# Patient Record
Sex: MALE | Race: WHITE | NOT HISPANIC OR LATINO | Employment: FULL TIME | URBAN - METROPOLITAN AREA
[De-identification: names, ages, dates, MRNs, and addresses within clinical notes are randomized per-mention and may not be internally consistent; named-entity substitution may affect disease eponyms.]

---

## 2017-01-05 ENCOUNTER — PATIENT OUTREACH (OUTPATIENT)
Dept: ADMINISTRATIVE | Facility: CLINIC | Age: 62
End: 2017-01-05
Payer: COMMERCIAL

## 2017-01-05 NOTE — Clinical Note
Please forward this important TCC information to your provider in order to maximize the post discharge care delivery of this patient.  C3 nurse spoke with Tr Chen  for a TCC post hospital discharge follow up call. The patient does not have a scheduled HOSFU appointment with Cornelius Bragg MD  within 7-14 days post hospital discharge date 1/3. C3 nurse was unable to schedule HOSFU appointment in [facility]. Please contact patient and schedule follow up appointment using HOSFU visit type on or before 1/17. Pt has appt with Dr Ruiz on 1/10@[phone]  Respectfully, Monica Hernández, SOBEIDA  [facility]   [email or web address]     Please do not reply to this message, as this inbox is not routinely monitored.

## 2017-01-05 NOTE — PATIENT INSTRUCTIONS
Fall Prevention   Falls often occur due to slipping, tripping or losing your balance. Here are ways to reduce your risk of falling again.   Was there anything that caused your fall that can be fixed, removed or replaced?   Make your home safe by keeping walkways clear of objects you may trip over.   Use non-slip pads under rugs.   Do not walk in poorly lit areas.   Do not stand on chairs or wobbly ladders.   Use caution when reaching overhead or looking upward. This position can cause a loss of balance.   Be sure your shoes fit properly, have non-slip bottoms and are in good condition.   Be cautious when going up and down stairs, curbs, and when walking on uneven sidewalks.   If your balance is poor, consider using a cane or walker.   If your fall was related to alcohol use, stop or limit alcohol intake.   If your fall was related to use of sleeping medicines, talk to your doctor about this. You may need to reduce your dosage at bedtime if you awaken during the night to go to the bathroom.   To reduce the need for nighttime bathroom trips:   Avoid drinking fluids for several hours before going to bed   Empty your bladder before going to bed   Men can keep a urinal at the bedside  Stay as active as you can. Balance, flexibility, strength, and endurance all come from exercise. They all play a role in preventing falls. Ask your heathcare provider which types of activity are right for you.  © 3122-3342 [employer]. [address] [place] [address], [place] [address]. All rights reserved. This information is not intended as a substitute for professional medical care. Always follow your healthcare professional's instructions.

## 2017-01-25 ENCOUNTER — OFFICE VISIT (OUTPATIENT)
Dept: FAMILY MEDICINE | Facility: CLINIC | Age: 62
End: 2017-01-25
Payer: COMMERCIAL

## 2017-01-25 VITALS
HEIGHT: 75 IN | BODY MASS INDEX: 23.28 KG/M2 | TEMPERATURE: 99 F | OXYGEN SATURATION: 98 % | HEART RATE: 79 BPM | WEIGHT: 187.19 LBS | DIASTOLIC BLOOD PRESSURE: 78 MMHG | SYSTOLIC BLOOD PRESSURE: 138 MMHG

## 2017-01-25 DIAGNOSIS — I10 ESSENTIAL HYPERTENSION: ICD-10-CM

## 2017-01-25 DIAGNOSIS — R37 SEXUAL DYSFUNCTION: ICD-10-CM

## 2017-01-25 DIAGNOSIS — C18.9 MALIGNANT NEOPLASM OF COLON, UNSPECIFIED SITE: ICD-10-CM

## 2017-01-25 DIAGNOSIS — G89.29 OTHER CHRONIC PAIN: Primary | ICD-10-CM

## 2017-01-25 PROCEDURE — 3078F DIAST BP <80 MM HG: CPT | Mod: S$GLB,,, | Performed by: INTERNAL MEDICINE

## 2017-01-25 PROCEDURE — 1159F MED LIST DOCD IN RCRD: CPT | Mod: S$GLB,,, | Performed by: INTERNAL MEDICINE

## 2017-01-25 PROCEDURE — 99999 PR PBB SHADOW E&M-EST. PATIENT-LVL III: CPT | Mod: PBBFAC,,, | Performed by: INTERNAL MEDICINE

## 2017-01-25 PROCEDURE — 99214 OFFICE O/P EST MOD 30 MIN: CPT | Mod: S$GLB,,, | Performed by: INTERNAL MEDICINE

## 2017-01-25 PROCEDURE — 3075F SYST BP GE 130 - 139MM HG: CPT | Mod: S$GLB,,, | Performed by: INTERNAL MEDICINE

## 2017-01-25 RX ORDER — SILDENAFIL 100 MG/1
100 TABLET, FILM COATED ORAL DAILY PRN
Qty: 12 TABLET | Refills: 12 | Status: SHIPPED | OUTPATIENT
Start: 2017-01-25 | End: 2018-01-30 | Stop reason: SDUPTHER

## 2017-01-25 RX ORDER — HYDROCODONE BITARTRATE AND ACETAMINOPHEN 5; 325 MG/1; MG/1
1 TABLET ORAL EVERY 6 HOURS PRN
Qty: 120 TABLET | Refills: 0 | Status: SHIPPED | OUTPATIENT
Start: 2017-02-25 | End: 2017-01-26 | Stop reason: SDUPTHER

## 2017-01-25 RX ORDER — HYDROCODONE BITARTRATE AND ACETAMINOPHEN 5; 325 MG/1; MG/1
1 TABLET ORAL EVERY 6 HOURS PRN
Qty: 120 TABLET | Refills: 0 | Status: SHIPPED | OUTPATIENT
Start: 2017-01-25 | End: 2017-05-01 | Stop reason: SDUPTHER

## 2017-01-25 RX ORDER — HYDROCODONE BITARTRATE AND ACETAMINOPHEN 5; 325 MG/1; MG/1
1 TABLET ORAL EVERY 6 HOURS PRN
Qty: 120 TABLET | Refills: 0 | Status: SHIPPED | OUTPATIENT
Start: 2017-03-25 | End: 2017-01-26 | Stop reason: SDUPTHER

## 2017-01-25 RX ORDER — HYDROCODONE BITARTRATE AND ACETAMINOPHEN 5; 325 MG/1; MG/1
1 TABLET ORAL EVERY 4 HOURS PRN
Qty: 30 TABLET | Refills: 0 | Status: CANCELLED | OUTPATIENT
Start: 2017-01-25

## 2017-01-25 NOTE — PROGRESS NOTES
Chief complaint refills and follow-up from colon cancer    61-year-old white male who finally did have his needed colonoscopy whereupon colon cancer was found.  He recently had a low anterior resection of the surgery notes were reviewed and appears he was doing well.  He's here for his refills of his chronic a medication which was mostly due to his activity with activity related worsening of his arthritis which was particularly in both ankles. Patient has appointment with oncology tomorrow.  I explained his staging as well as gave him a copy of the pathology report.  His stepdaughter is an oncologist in [facility] encouraged him to discuss with her as well as.  We discussed the probability that he will benefit from some chemotherapy.  Questions about going to work which I do have confidence should be back to full duty in the next few months.  He also recently had a little bit of new sexual dysfunction with anorgasmia on one occasion but no erectile dysfunction and I reassured him I can't predict anything related to the surgery, medications and so forth at this point would relate to that and he should assess if indeed is an ongoing problem.  Patient counseled at length regarding all these issuesTotal time over 25 minutes with over 50% counseling.      PAST MEDICAL HISTORY:   1. ED.   2. HTN.   3. Smoker.   4. Allergic rhinitis with exposure to mold   5. Frequent bronchitis   6. Lumbar strain due to work on [employer]   7. Large colon polyp removed before age 50 - over 10-15 -yrs  8.  Chronic pain referable to ankle arthritis   9.  Colon cancer 2016 with low anterior resection    Social history: Still works both one pack per day, no alcohol previously  with kids, works on the railroad     Family history: Mom  of breast cancer, father  with dementia. One younger brother with no medical problems    Vitals as above  Gen: no distress    Exam otherwise deferred, surgical incision below the umbilicus is  healing well without infection on examination today        Tr was seen today for medication refill.    Diagnoses and all orders for this visit:    Other chronic pain, back to his chronic and stable pain, that doesn't appear to be any increase in pain related to the recent surgery    Essential hypertension, chronic and stable    Malignant neoplasm of colon, unspecified site, discussed as above    Sexual dysfunction, monitor clinically but reassured him there is no new erectile dysfunction which would be a bigger problem    Other orders  -     Cancel: hydrocodone-acetaminophen 5-325mg (NORCO) 5-325 mg per tablet; Take 1 tablet by mouth every 4 (four) hours as needed.  -     sildenafil (VIAGRA) 100 MG tablet; Take 1 tablet (100 mg total) by mouth daily as needed for Erectile Dysfunction. half -1 Tablet Oral .  Take 30 min before anticipated need.  -     hydrocodone-acetaminophen 5-325mg (NORCO) 5-325 mg per tablet; Take 1 tablet by mouth every 6 (six) hours as needed for Pain.  -     hydrocodone-acetaminophen 5-325mg (NORCO) 5-325 mg per tablet; Take 1 tablet by mouth every 6 (six) hours as needed for Pain.  -     hydrocodone-acetaminophen 5-325mg (NORCO) 5-325 mg per tablet; Take 1 tablet by mouth every 6 (six) hours as needed for Pain.

## 2017-01-26 ENCOUNTER — TELEPHONE (OUTPATIENT)
Dept: PHARMACY | Facility: CLINIC | Age: 62
End: 2017-01-26

## 2017-01-26 ENCOUNTER — INITIAL CONSULT (OUTPATIENT)
Dept: HEMATOLOGY/ONCOLOGY | Facility: CLINIC | Age: 62
End: 2017-01-26
Payer: COMMERCIAL

## 2017-01-26 ENCOUNTER — LAB VISIT (OUTPATIENT)
Dept: LAB | Facility: HOSPITAL | Age: 62
End: 2017-01-26
Attending: INTERNAL MEDICINE
Payer: COMMERCIAL

## 2017-01-26 VITALS
HEART RATE: 104 BPM | DIASTOLIC BLOOD PRESSURE: 92 MMHG | BODY MASS INDEX: 23.58 KG/M2 | HEIGHT: 75 IN | OXYGEN SATURATION: 97 % | SYSTOLIC BLOOD PRESSURE: 144 MMHG | TEMPERATURE: 99 F | WEIGHT: 189.63 LBS

## 2017-01-26 DIAGNOSIS — C20 RECTAL CARCINOMA: ICD-10-CM

## 2017-01-26 DIAGNOSIS — Z51.11 ENCOUNTER FOR CHEMOTHERAPY MANAGEMENT: ICD-10-CM

## 2017-01-26 DIAGNOSIS — C20 RECTAL CARCINOMA: Primary | ICD-10-CM

## 2017-01-26 LAB
ALBUMIN SERPL BCP-MCNC: 3.7 G/DL
ALP SERPL-CCNC: 82 U/L
ALT SERPL W/O P-5'-P-CCNC: 12 U/L
ANION GAP SERPL CALC-SCNC: 7 MMOL/L
AST SERPL-CCNC: 17 U/L
BASOPHILS # BLD AUTO: 0.02 K/UL
BASOPHILS NFR BLD: 0.2 %
BILIRUB SERPL-MCNC: 0.4 MG/DL
BUN SERPL-MCNC: 10 MG/DL
CALCIUM SERPL-MCNC: 9.1 MG/DL
CHLORIDE SERPL-SCNC: 104 MMOL/L
CO2 SERPL-SCNC: 27 MMOL/L
CREAT SERPL-MCNC: 0.9 MG/DL
DIFFERENTIAL METHOD: ABNORMAL
EOSINOPHIL # BLD AUTO: 0.2 K/UL
EOSINOPHIL NFR BLD: 2.7 %
ERYTHROCYTE [DISTWIDTH] IN BLOOD BY AUTOMATED COUNT: 13.7 %
EST. GFR  (AFRICAN AMERICAN): >60 ML/MIN/1.73 M^2
EST. GFR  (NON AFRICAN AMERICAN): >60 ML/MIN/1.73 M^2
GLUCOSE SERPL-MCNC: 99 MG/DL
HCT VFR BLD AUTO: 46.4 %
HGB BLD-MCNC: 15.5 G/DL
LYMPHOCYTES # BLD AUTO: 1.7 K/UL
LYMPHOCYTES NFR BLD: 19.3 %
MCH RBC QN AUTO: 31.6 PG
MCHC RBC AUTO-ENTMCNC: 33.4 %
MCV RBC AUTO: 95 FL
MONOCYTES # BLD AUTO: 1 K/UL
MONOCYTES NFR BLD: 11.1 %
NEUTROPHILS # BLD AUTO: 6 K/UL
NEUTROPHILS NFR BLD: 66.7 %
PLATELET # BLD AUTO: 241 K/UL
PMV BLD AUTO: 9.1 FL
POTASSIUM SERPL-SCNC: 4.3 MMOL/L
PROT SERPL-MCNC: 7.4 G/DL
RBC # BLD AUTO: 4.9 M/UL
SODIUM SERPL-SCNC: 138 MMOL/L
WBC # BLD AUTO: 9.03 K/UL

## 2017-01-26 PROCEDURE — 85025 COMPLETE CBC W/AUTO DIFF WBC: CPT

## 2017-01-26 PROCEDURE — 82378 CARCINOEMBRYONIC ANTIGEN: CPT

## 2017-01-26 PROCEDURE — 36415 COLL VENOUS BLD VENIPUNCTURE: CPT

## 2017-01-26 PROCEDURE — 80053 COMPREHEN METABOLIC PANEL: CPT

## 2017-01-26 PROCEDURE — 3080F DIAST BP >= 90 MM HG: CPT | Mod: S$GLB,,, | Performed by: INTERNAL MEDICINE

## 2017-01-26 PROCEDURE — 99205 OFFICE O/P NEW HI 60 MIN: CPT | Mod: S$GLB,,, | Performed by: INTERNAL MEDICINE

## 2017-01-26 PROCEDURE — 99999 PR PBB SHADOW E&M-EST. PATIENT-LVL III: CPT | Mod: PBBFAC,,, | Performed by: INTERNAL MEDICINE

## 2017-01-26 PROCEDURE — 3077F SYST BP >= 140 MM HG: CPT | Mod: S$GLB,,, | Performed by: INTERNAL MEDICINE

## 2017-01-26 RX ORDER — CAPECITABINE 500 MG/1
1000 TABLET, FILM COATED ORAL 2 TIMES DAILY
Qty: 126 TABLET | Refills: 0 | Status: SHIPPED | OUTPATIENT
Start: 2017-01-26 | End: 2017-02-09

## 2017-01-26 NOTE — LETTER
January 29, 2017      Darrick Ruiz MD  [address] [place] [address] [facility]  [place] [address]           [facility]  [address] [facility] [address] [address]  Phone: [phone]          Patient: Tr Chen   MR Number: [id number]   YOB: 1955   Date of Visit: 1/26/2017       Dear Dr. Darrick Ruiz:    Thank you for referring Tr Chen to me for evaluation. Attached you will find relevant portions of my assessment and plan of care.    If you have questions, please do not hesitate to call me. I look forward to following Tr Chen along with you.    Sincerely,    Latrice Jones MD    Enclosure  CC:  No Recipients    If you would like to receive this communication electronically, please contact [email or web address] or [phone] to request more information on [employer] access.    For providers and/or their staff who would like to refer a patient to [facility], please contact us through our one-stop-shop provider referral line, Yenny Gan, at [phone].    If you feel you have received this communication in error or would no longer like to receive these types of communications, please e-mail [email or web address]

## 2017-01-26 NOTE — PROGRESS NOTES
Subjective:       Patient ID: Tr Chen is a 61 y.o. male.    Chief Complaint: Consult (colon cancer)  REASON FOR CONSULTATION:  Rectal cancer.  REFERRING PHYSICIAN:  MURPHY Campbell     HISTORY OF PRESENT ILLNESS:  The patient is a 61-year-old gentleman with past medical history of colonic polyps, hypertension, tobacco abuse, seen today in consultation for recently diagnosed rectosigmoid adenocarcinoma.  The patient reports intermittent rectal bleeding for the past 3 to 4 months.  He subsequently underwent a colonoscopy on 11/21/2016, which revealed polyps noted in the descending colon, sigmoid colon, and rectum and also noted for a malignant-appearing partially obstructing tumor in the distal sigmoid colon with ulceration. Pathology of the rectosigmoid mass revealed a tubulovillous adenoma and polyps benign..The patient reports he underwent a colonoscopy during his 40s for rectal bleeding.  He was diagnosed with a large bleeding polyp, for which he underwent removal.  He did not undergo surveillance colonoscopies as advised.  No family history of colon cancer.  CT of the abdomen and pelvis on 11/22/2016 revealed thickening of the rectal wall, findings, rectosigmoid neoplasm at the rectosigmoid junction and numerous hepatic lesions compatible with hepatic cysts. He  subsequently underwent a rectal sigmoidectomy on 12/27/2016 .  Pathology revealed an adenocarcinoma, low grade, clear margins. The patient underwent removal of 24 lymph nodes with 0 lymph nodes involved.  Evidence of tumor deposits noted with pathologic staging pT3, pN1c.  He is here forfurther evaluation.    PAST MEDICAL HISTORY:  Erectile dysfunction, colonic polyps, hypertension, tobacco abuse disorder, chronic pain.    PAST SURGICAL HISTORY:  As above.    SOCIAL HISTORY:  He is a former smoker.  He has a greater than 40-pack-year.  He drinks socially.  He is employed as an  for [employer]  .    FAMILY  "HISTORY:  Mom diagnosed with breast cancer at age 61.  Dad  at age 87 from Alzheimer's.  He has one sibling, brother in overall good health.        Review of Systems   Constitutional: Negative for appetite change, fatigue, fever and unexpected weight change.   HENT: Negative for mouth sores and nosebleeds.    Eyes: Negative for visual disturbance.   Respiratory: Negative for cough and shortness of breath.    Cardiovascular: Negative for chest pain and leg swelling.   Gastrointestinal: Negative for abdominal pain, blood in stool, constipation, diarrhea, nausea and vomiting.   Genitourinary: Negative for frequency and hematuria.   Musculoskeletal: Negative for arthralgias and back pain.   Skin: Negative for rash.   Neurological: Negative for dizziness, weakness, light-headedness, numbness and headaches.   Hematological: Negative for adenopathy.   Psychiatric/Behavioral: The patient is nervous/anxious.        Objective:       Vitals:    17 0948   BP: (!) 144/92   BP Location: Right arm   Patient Position: Sitting   BP Method: Manual   Pulse: 104   Temp: 98.5 °F (36.9 °C)   TempSrc: Oral   SpO2: 97%   Weight: 86 kg (189 lb 9.5 oz)   Height: 6' 3" (1.905 m)       Physical Exam   Constitutional: He is oriented to person, place, and time. He appears well-developed and well-nourished.   HENT:   Head: Normocephalic.   Mouth/Throat: Oropharynx is clear and moist. No oropharyngeal exudate.   Eyes: Conjunctivae are normal. No scleral icterus.   Neck: Normal range of motion. Neck supple. No thyromegaly present.   Cardiovascular: Normal rate, regular rhythm and normal heart sounds.    No murmur heard.  Pulmonary/Chest: Effort normal and breath sounds normal. He has no wheezes. He has no rales.   Abdominal: Soft. Bowel sounds are normal. He exhibits no distension and no mass. There is no hepatosplenomegaly. There is no tenderness. There is no rebound and no guarding.   Musculoskeletal: Normal range of motion. He " exhibits no edema.   Lymphadenopathy:     He has no cervical adenopathy.     He has no axillary adenopathy.        Right: No supraclavicular adenopathy present.        Left: No supraclavicular adenopathy present.   Neurological: He is alert and oriented to person, place, and time. No cranial nerve deficit.   Skin: No rash noted. No erythema.   Psychiatric: He has a normal mood and affect.         CT a/p  1.  Thickening of the wall of the rectosigmoid junction either from a neoplasm or from inflammatory processes.  2.  Numerous low attenuation hepatic cysts.  3.  Fibrofatty infiltration of the liver.  4.  Spondylosis in the lumbar spine as discussed above.      Assessment:       1. Rectal carcinoma    2. Encounter for chemotherapy management        Plan:     In summary, a 61-year-old with recently diagnosed rectosigmoid adenocarcinoma, status post LAR,12/27/2016.pT3 pN1c with 0 of 24 lymph nodes involved with tumor deposits present. Discussed pathology findings in detail.  CT imaging studies negative for evidence of distant disease.  Plan PET/CT imaging studies for further evaluation   Discussed the issue of adjuvant chemotherapy in the setting to reduce recurrence risk. Await PET/CT   findings. If current staging unchanged, plan six months of adjuvant therapy involving chemotherapy and radiation. Patient strongly desires to  continue to work during therapy.    Potential chemotherapy regimen includes CAPOX . Patient would like to avoid continuous infusion regimen if possible due to work schedule. He was provided with a handout on the planned therapy.  He will undergo formal chemo teaching. Await results of the final workup prior to initiation of planned therapy.  Planned testing today including CBC, CMP, and CEA testing.  All questions posed were answered to the patient's satisfaction.  Tentative initiation of combination adjuvant chemotherapy planned for 02/07/2017.    Thank you for allowing us to participate in the  care of your patient.        CC: Darrick Ruiz M.D.    Addendum: PET/CT imaging reveals hypermetabolic liver lesion. CT guided bx planned. D/w Dr. Ruiz

## 2017-01-26 NOTE — TELEPHONE ENCOUNTER
Ade,    Please instruct pt he is to begin XELODA on day of Oxaliplatin administration. His first dose will be pm dose ( evening of Oxaliplatin).     Thanks,    SJ

## 2017-01-26 NOTE — Clinical Note
PET within 1 wk Labs today  Handout on Oxaliplatin and Xeloda Send Xeloda script to [facility] or [facility] ( IF xeloda not covered - will need FOLFOX)

## 2017-01-27 ENCOUNTER — TELEPHONE (OUTPATIENT)
Dept: HEMATOLOGY/ONCOLOGY | Facility: CLINIC | Age: 62
End: 2017-01-27

## 2017-01-27 ENCOUNTER — TELEPHONE (OUTPATIENT)
Dept: PHARMACY | Facility: CLINIC | Age: 62
End: 2017-01-27

## 2017-01-27 ENCOUNTER — HOSPITAL ENCOUNTER (OUTPATIENT)
Dept: RADIOLOGY | Facility: HOSPITAL | Age: 62
Discharge: HOME | End: 2017-01-27
Attending: INTERNAL MEDICINE
Payer: COMMERCIAL

## 2017-01-27 DIAGNOSIS — C20 RECTAL CARCINOMA: ICD-10-CM

## 2017-01-27 LAB — CEA SERPL-MCNC: 6.1 NG/ML

## 2017-01-27 PROCEDURE — 78815 PET IMAGE W/CT SKULL-THIGH: CPT | Mod: 26,PI,, | Performed by: RADIOLOGY

## 2017-01-27 PROCEDURE — A9552 F18 FDG: HCPCS

## 2017-01-27 NOTE — TELEPHONE ENCOUNTER
Called & scheduled pt to come for chemo education Monday at 11:30am.  He will receive Xeloda Tuesday.

## 2017-01-30 ENCOUNTER — DOCUMENTATION ONLY (OUTPATIENT)
Dept: HEMATOLOGY/ONCOLOGY | Facility: CLINIC | Age: 62
End: 2017-01-30

## 2017-01-30 DIAGNOSIS — R11.0 NAUSEA: Primary | ICD-10-CM

## 2017-01-30 RX ORDER — PROCHLORPERAZINE MALEATE 10 MG
10 TABLET ORAL EVERY 6 HOURS PRN
Qty: 30 TABLET | Refills: 1 | Status: SHIPPED | OUTPATIENT
Start: 2017-01-30 | End: 2017-03-24 | Stop reason: SDUPTHER

## 2017-01-30 RX ORDER — ONDANSETRON HYDROCHLORIDE 8 MG/1
8 TABLET, FILM COATED ORAL EVERY 12 HOURS PRN
Qty: 30 TABLET | Refills: 2 | Status: ON HOLD | OUTPATIENT
Start: 2017-01-30 | End: 2017-12-27

## 2017-01-30 NOTE — PROGRESS NOTES
Chemotherapy education provided to patient.  Resource binder given as well as handouts on Oxaliplatin & Xeloda.  Regimen reviewed, most common side effects such as nausea, diarrhea, neuropathy, cold sensitivity, & low blood counts discussed. He is aware of the possibility of hand foot syndrome, how to manage & when to call the physician.  We discussed how to take the Xeloda, proper storage & handling. All questions answered, pt still feeling overwhelmed.  Gave tour of infusion center & instructed to call with any concerns. Message sent to  to escribe Zofran & Compazine for nausea.

## 2017-01-31 ENCOUNTER — TELEPHONE (OUTPATIENT)
Dept: HEMATOLOGY/ONCOLOGY | Facility: CLINIC | Age: 62
End: 2017-01-31

## 2017-01-31 DIAGNOSIS — C20 RECTAL CANCER: Primary | ICD-10-CM

## 2017-01-31 DIAGNOSIS — K76.9 LIVER LESION: ICD-10-CM

## 2017-01-31 DIAGNOSIS — R94.8 ABNORMAL POSITRON EMISSION TOMOGRAPHY (PET) SCAN: ICD-10-CM

## 2017-01-31 NOTE — TELEPHONE ENCOUNTER
Plan CT guided liver bx asap on pt ( preferably this wk or early next wk) per IR    Hold off on chemo until LIVER bx completed and resulted    Please contact pt and will notify pt of plan

## 2017-02-02 NOTE — PRE ADMISSION SCREENING
RN phone pre op done.  Patient instructed to remain NPO after midnight tonight.  To wash abdominal area.  Expressed understanding of instructions.  To arrive 10:00 am in Same Day Surgery.

## 2017-02-03 ENCOUNTER — HOSPITAL ENCOUNTER (OUTPATIENT)
Facility: HOSPITAL | Age: 62
Discharge: HOME | End: 2017-02-03
Attending: RADIOLOGY | Admitting: RADIOLOGY
Payer: COMMERCIAL

## 2017-02-03 ENCOUNTER — SURGERY (OUTPATIENT)
Age: 62
End: 2017-02-03

## 2017-02-03 VITALS
DIASTOLIC BLOOD PRESSURE: 82 MMHG | BODY MASS INDEX: 22.88 KG/M2 | OXYGEN SATURATION: 97 % | SYSTOLIC BLOOD PRESSURE: 137 MMHG | WEIGHT: 184 LBS | HEIGHT: 75 IN | TEMPERATURE: 98 F | HEART RATE: 92 BPM | RESPIRATION RATE: 20 BRPM

## 2017-02-03 DIAGNOSIS — C20 RECTAL CANCER: ICD-10-CM

## 2017-02-03 DIAGNOSIS — K76.9 LIVER LESION: ICD-10-CM

## 2017-02-03 PROCEDURE — 99153 MOD SED SAME PHYS/QHP EA: CPT

## 2017-02-03 PROCEDURE — 88307 TISSUE EXAM BY PATHOLOGIST: CPT

## 2017-02-03 PROCEDURE — 25500020 PHARM REV CODE 255: Performed by: RADIOLOGY

## 2017-02-03 PROCEDURE — 63600175 PHARM REV CODE 636 W HCPCS: Performed by: RADIOLOGY

## 2017-02-03 PROCEDURE — 88307 TISSUE EXAM BY PATHOLOGIST: CPT | Mod: 26,,,

## 2017-02-03 PROCEDURE — 99152 MOD SED SAME PHYS/QHP 5/>YRS: CPT

## 2017-02-03 RX ORDER — HYDROCODONE BITARTRATE AND ACETAMINOPHEN 5; 325 MG/1; MG/1
1 TABLET ORAL EVERY 4 HOURS PRN
Status: DISCONTINUED | OUTPATIENT
Start: 2017-02-03 | End: 2017-02-03 | Stop reason: HOSPADM

## 2017-02-03 RX ORDER — FENTANYL CITRATE 50 UG/ML
INJECTION, SOLUTION INTRAMUSCULAR; INTRAVENOUS CODE/TRAUMA/SEDATION MEDICATION
Status: COMPLETED | OUTPATIENT
Start: 2017-02-03 | End: 2017-02-03

## 2017-02-03 RX ORDER — MIDAZOLAM HYDROCHLORIDE 1 MG/ML
INJECTION, SOLUTION INTRAMUSCULAR; INTRAVENOUS CODE/TRAUMA/SEDATION MEDICATION
Status: COMPLETED | OUTPATIENT
Start: 2017-02-03 | End: 2017-02-03

## 2017-02-03 RX ADMIN — IOHEXOL 75 ML: 350 INJECTION, SOLUTION INTRAVENOUS at 03:02

## 2017-02-03 RX ADMIN — MIDAZOLAM HYDROCHLORIDE 1 MG: 1 INJECTION, SOLUTION INTRAMUSCULAR; INTRAVENOUS at 12:02

## 2017-02-03 RX ADMIN — FENTANYL CITRATE 50 MCG: 50 INJECTION INTRAMUSCULAR; INTRAVENOUS at 12:02

## 2017-02-03 NOTE — OP NOTE
[facility]  Interventional Radiology  IR Procedure - Outpatient    Date: 02/03/2017 Time: 1:20 PM    Pre-Op Diagnosis: Liver lesion on PET-CT scan    Post-Op Diagnosis: same    Procedure Performed by: Bal Catalan MD    Assistant: none    Procedure: CT guided Liver mass biopsy    Specimen/Tissue Removed: 6 x 18 gauge cores    Estimated Blood Loss: Less than 5 mL    Procedure Note/Findings: CT guided liver biopsy performed. Pathology unable to confirm adequacy of specimen despite careful attempts to ensure correct location biopsied. 6 x 18 gauge cores 33 mm in length obtained. No immediate post-procedure complications noted.    Please refer to dictated report for additional details.

## 2017-02-03 NOTE — H&P
[facility]  History & Physical - Short Stay  Interventional Radiology    SUBJECTIVE:     Chief Complaint/Reason for Admission: Liver lesion on PET-CT scan.    Informant(s):  self and Electronic Health Record    History of Present Illness:  Tr Chen is a 61 y.o. male with a history of colon CA with focal suspicious liver lesion on PET-CT scan.    Patient presents for CT guided liver biopsy.    Scheduled Meds:   Continuous Infusions:   PRN Meds:     Review of patient's allergies indicates:   Allergen Reactions    No known allergies        Past Medical History   Diagnosis Date    Chronic pain 11/20/2012    Erectile dysfunction 5/16/2014    History of colonic polyps 7/16/2012    HTN (hypertension) 7/16/2012    Hypertension     Tobacco use disorder 1/19/2015     Past Surgical History   Procedure Laterality Date    Colonoscopy N/A 11/21/2016     Procedure: COLONOSCOPY;  Surgeon: Jono Resendez MD;  Location: [facility];  Service: Endoscopy;  Laterality: N/A;  needs scope done 2/24/16  -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 No pertinent family history.  Social History   Substance Use Topics    Smoking status: Current Every Day Smoker     Packs/day: 0.30     Years: 30.00    Smokeless tobacco: Never Used    Alcohol use Yes      Comment: social        Review of Systems:  ROS not obtained    OBJECTIVE:     Vital Signs (Most Recent):  Temp: 98.3 °F (36.8 °C) (02/03/17 0832)  Pulse: 84 (02/03/17 0832)  Resp: 16 (02/03/17 0832)  BP: 133/89 (02/03/17 0832)  SpO2: 100 % (02/03/17 0832)    Physical Exam:  Lungs: No respiratory distress  Cardiac: regular rate and rhythm    Laboratory  CBC:   Lab Results   Component Value Date/Time    WBC 9.03 01/26/2017 11:35 AM    RBC 4.90 01/26/2017 11:35 AM    HGB 15.5 01/26/2017 11:35 AM    HCT 46.4 01/26/2017 11:35 AM     01/26/2017 11:35 AM    MCV 95 01/26/2017 11:35 AM    MCH 31.6 (H)  01/26/2017 11:35 AM    MCHC 33.4 01/26/2017 11:35 AM     Coagulation:   Lab Results   Component Value Date/Time    INR 1.0 12/20/2016 01:45 PM    APTT 27.8 12/20/2016 01:45 PM       ASSESSMENT/PLAN:     Liver lesion on PET-CT scan.    Patient will undergo CT guided liver mass biopsy.    Sedation/Anesthesia Assessment:  ASA Classification: II = Mild systemic disease  Mallampati Score: II (hard and soft palate, upper portion of tonsils anduvula visible)    Sedation History: No problems    Sedation Plan: Conscious sedation

## 2017-02-03 NOTE — DISCHARGE INSTRUCTIONS
BATHING:  ? You may shower tomorrow.  DRESSING:  ? Remove dressing tomorrow.        ACTIVITY LEVEL: If you have received sedation or an anesthetic, you may feel sleepy for several hours. Rest until you are more awake. Gradually resume your normal activities      DIET: You may resume your home diet. If nausea is present, increase your diet gradually with fluids and bland foods.    Medications: Pain medication should be taken only if needed and as directed. If antibiotics are prescribed, the medication should be taken until completed. You will be given an updated list of you medications.  ? No driving, alcoholic beverages or signing legal documents for next 24 hours if you have had sedation, or while taking pain medication    CALL THE DOCTOR:   For any obvious bleeding (some dried blood over the incision is normal).     Redness, swelling, foul smell around incision or fever over 101.  Shortness of breath.  Persistent pain or nausea not relieved by medication.  Call  [phone]     to speak with an Interventional Radiologist    If any unusual problems or difficulties occur contact your doctor. If you cannot contact your doctor but feel your signs and symptoms warrant a physicians attention return to the emergency room.

## 2017-02-03 NOTE — DISCHARGE SUMMARY
Radiology Discharge Summary      Admit date: 2/3/2017  8:16 AM  Discharge date: February 3, 2017    Instructions Given to patient: YesVerbal    Diet: Regular    Activity:Restriction as listed: No strenuous activities for 48 hours    Medications on discharge (List): Refer to Discharge Medication List    Hospital Course: CT guided liver biopsy    Description of Condition on Discharge: stable    Discharge Disposition: Home    Discharge Diagnosis: Liver lesion on PET scan, h/o colon CA    Follow up with Dr. Jones as scheduled.

## 2017-02-03 NOTE — IP AVS SNAPSHOT
[place] [address] [place] [address]  Phone: [phone]           Patient Discharge Instructions     Our goal is to set you up for success. This packet includes information on your condition, medications, and your home care. It will help you to care for yourself so you don't get sicker and need to go back to the hospital.     Please ask your nurse if you have any questions.        There are many details to remember when preparing to leave the hospital. Here is what you will need to do:    1. Take your medicine. If you are prescribed medications, review your Medication List in the following pages. You may have new medications to  at the pharmacy and others that you'll need to stop taking. Review the instructions for how and when to take your medications. Talk with your doctor or nurses if you are unsure of what to do.     2. Go to your follow-up appointments. Specific follow-up information is listed in the following pages. Your may be contacted by a transition nurse or clinical provider about future appointments. Be sure we have all of the phone numbers to reach you, if needed. Please contact your provider's office if you are unable to make an appointment.     3. Watch for warning signs. Your doctor or nurse will give you detailed warning signs to watch for and when to call for assistance. These instructions may also include educational information about your condition. If you experience any of warning signs to your health, call your doctor.               [facility]  Unless otherwise directed by your provider, please contact [facility], our nurse care line that is available for 24/7 assistance.     [phone] (toll-free)    Registered nurses in the [facility] provide clinical advisement, health education, appointment booking, and other advisory services.                    ** Verify the list of medication(s) below is accurate and up to date.  Carry this with you in case of emergency. If your medications have changed, please notify your healthcare provider.             Medication List      CONTINUE taking these medications        Additional Info                      amlodipine 10 MG tablet   Commonly known as:  NORVASC   Quantity:  90 tablet   Refills:  12   Dose:  10 mg    Instructions:  Take 1 tablet (10 mg total) by mouth once daily.     Begin Date    AM    Noon    PM    Bedtime       ANTIBIOTIC (BACITRACIN ZINC) 500 unit/gram Oint   Refills:  0   Generic drug:  bacitracin    Instructions:  Apply topically 2 (two) times daily.     Begin Date    AM    Noon    PM    Bedtime       capecitabine 500 MG Tab   Commonly known as:  XELODA   Quantity:  126 tablet   Refills:  0   Dose:  1000 mg/m2    Instructions:  Take 4.5 tablets (2,250 mg total) by mouth 2 (two) times daily. Start Taking medication the evening of Day 1 of chemo     Begin Date    AM    Noon    PM    Bedtime       hydrocodone-acetaminophen 5-325mg 5-325 mg per tablet   Commonly known as:  NORCO   Quantity:  120 tablet   Refills:  0   Dose:  1 tablet    Instructions:  Take 1 tablet by mouth every 6 (six) hours as needed for Pain.     Begin Date    AM    Noon    PM    Bedtime       ondansetron 8 MG tablet   Commonly known as:  ZOFRAN   Quantity:  30 tablet   Refills:  2   Dose:  8 mg    Instructions:  Take 1 tablet (8 mg total) by mouth every 12 (twelve) hours as needed for Nausea.     Begin Date    AM    Noon    PM    Bedtime       prochlorperazine 10 MG tablet   Commonly known as:  COMPAZINE   Quantity:  30 tablet   Refills:  1   Dose:  10 mg    Instructions:  Take 1 tablet (10 mg total) by mouth every 6 (six) hours as needed.     Begin Date    AM    Noon    PM    Bedtime       sildenafil 100 MG tablet   Commonly known as:  VIAGRA   Quantity:  12 tablet   Refills:  12   Dose:  100 mg    Instructions:  Take 1 tablet (100 mg total) by mouth daily as needed for Erectile Dysfunction. half -1 Tablet  Oral .  Take 30 min before anticipated need.     Begin Date    AM    Noon    PM    Bedtime       SINGULAIR 10 mg tablet   Refills:  0   Dose:  1 tablet   Generic drug:  montelukast    Instructions:  Take 1 tablet by mouth once daily.     Begin Date    AM    Noon    PM    Bedtime       tramadol 50 mg tablet   Commonly known as:  ULTRAM   Refills:  0   Dose:  50 mg    Instructions:  Take 50 mg by mouth every 4 (four) hours as needed.     Begin Date    AM    Noon    PM    Bedtime                  Please bring to all follow up appointments:    1. A copy of your discharge instructions.  2. All medicines you are currently taking in their original bottles.  3. Identification and insurance card.    Please arrive 15 minutes ahead of scheduled appointment time.    Please call 24 hours in advance if you must reschedule your appointment and/or time.        Your Scheduled Appointments     Feb 22, 2017  8:00 AM CST   Established Patient Visit with Latrice Jones MD   [facility]    [address]  [place] [address]   [phone]            Feb 22, 2017  9:00 AM CST   Infusion 180 MIn with CHAIR 02 [facility])    [address]  [place] [address]   [phone]                Discharge Instructions     Future Orders    Call MD for:  extreme fatigue     Call MD for:  persistent dizziness or light-headedness     Call MD for:  persistent nausea and vomiting     Call MD for:  redness, tenderness, or signs of infection (pain, swelling, redness, odor or green/yellow discharge around incision site)     Call MD for:  severe uncontrolled pain     Call MD for:  temperature >100.4     Diet general     Questions:    Total calories:      Fat restriction, if any:      Protein restriction, if any:      Na restriction, if any:      Fluid restriction:      Additional restrictions:      No dressing needed     Other restrictions (specify):      "Comments:    No strenuous activities for 48 hours        Discharge Instructions       BATHING:  ? You may shower tomorrow.  DRESSING:  ? Remove dressing tomorrow.        ACTIVITY LEVEL: If you have received sedation or an anesthetic, you may feel sleepy for several hours. Rest until you are more awake. Gradually resume your normal activities      DIET: You may resume your home diet. If nausea is present, increase your diet gradually with fluids and bland foods.    Medications: Pain medication should be taken only if needed and as directed. If antibiotics are prescribed, the medication should be taken until completed. You will be given an updated list of you medications.  ? No driving, alcoholic beverages or signing legal documents for next 24 hours if you have had sedation, or while taking pain medication    CALL THE DOCTOR:   For any obvious bleeding (some dried blood over the incision is normal).     Redness, swelling, foul smell around incision or fever over 101.  Shortness of breath.  Persistent pain or nausea not relieved by medication.  Call  [phone]     to speak with an Interventional Radiologist    If any unusual problems or difficulties occur contact your doctor. If you cannot contact your doctor but feel your signs and symptoms warrant a physicians attention return to the emergency room.         Discharge References/Attachments     BIOPSY, LIVER (ENGLISH)    LIVER BIOPSY, DISCHARGE INSTRUCTIONS (ENGLISH)        Admission Information     Date & Time Provider Department [facility]    2/3/2017  8:16 AM Vianca Walter MD [facility] [phone]      Care Providers     Provider Role Specialty Primary office phone    Vianca Walter MD Attending Provider Radiology [phone]    [facility] Diagnostic Provider Surgeon  -- Number not on file      Your Vitals Were     BP Pulse Temp Resp Height Weight    135/78 78 97.8 °F (36.6 °C) (Oral) 18 6' 3" (1.905 m) 83.5 kg (184 lb)    SpO2 BMI             97% 23 " kg/m2         Recent Lab Values     No lab values to display.      Pending Labs     Order Current Status    Pathology Tissue Specimen To Pathology, Radiology Biopsy In process      Allergies as of 2/3/2017        Reactions    No Known Allergies       Advance Directives     An advance directive is a document which, in the event you are no longer able to make decisions for yourself, tells your healthcare team what kind of treatment you do or do not want to receive, or who you would like to make those decisions for you.  If you do not currently have an advance directive, [facility] encourages you to create one.  For more information call:  [phone]), [phone] ([phone]),  or log on to [email or web address].        Smoking Cessation     If you would like to quit smoking:   You may be eligible for free services if you are a [place] resident and started smoking cigarettes before September 1, 1988.  Call the Smoking Cessation Trust ([facility]) toll free at (395) 819-4813 or (333) 347-8832.   Call 2-905-QUIT-NOW if you do not meet the above criteria.            Language Assistance Services     ATTENTION: Language assistance services are available, free of charge. Please call [phone].      ATENCIÓN: Si habla español, tiene a montano disposición servicios gratuitos de asistencia lingüística. Llame al [phone].     CHÚ Ý: N?u b?n nói Ti?ng Vi?t, có các d?ch v? h? tr? ngôn ng? mi?n phí dành cho b?n. G?i s? [phone].         [facility] complies with applicable Federal civil rights laws and does not discriminate on the basis of race, color, national origin, age, disability, or sex.

## 2017-02-07 ENCOUNTER — TELEPHONE (OUTPATIENT)
Dept: HEMATOLOGY/ONCOLOGY | Facility: CLINIC | Age: 62
End: 2017-02-07

## 2017-02-13 ENCOUNTER — TELEPHONE (OUTPATIENT)
Dept: PHARMACY | Facility: CLINIC | Age: 62
End: 2017-02-13

## 2017-02-13 ENCOUNTER — INFUSION (OUTPATIENT)
Dept: INFUSION THERAPY | Facility: HOSPITAL | Age: 62
End: 2017-02-13
Attending: INTERNAL MEDICINE
Payer: COMMERCIAL

## 2017-02-13 VITALS — HEIGHT: 75 IN | BODY MASS INDEX: 24.75 KG/M2 | WEIGHT: 199.06 LBS

## 2017-02-13 DIAGNOSIS — C20 RECTAL CARCINOMA: Primary | ICD-10-CM

## 2017-02-13 DIAGNOSIS — C20 MALIGNANT NEOPLASM OF RECTUM: Primary | ICD-10-CM

## 2017-02-13 PROCEDURE — 96415 CHEMO IV INFUSION ADDL HR: CPT

## 2017-02-13 PROCEDURE — 25000003 PHARM REV CODE 250: Performed by: INTERNAL MEDICINE

## 2017-02-13 PROCEDURE — 63600175 PHARM REV CODE 636 W HCPCS: Performed by: INTERNAL MEDICINE

## 2017-02-13 PROCEDURE — 96367 TX/PROPH/DG ADDL SEQ IV INF: CPT

## 2017-02-13 PROCEDURE — 96413 CHEMO IV INFUSION 1 HR: CPT

## 2017-02-13 RX ORDER — SODIUM CHLORIDE 0.9 % (FLUSH) 0.9 %
10 SYRINGE (ML) INJECTION
Status: CANCELLED | OUTPATIENT
Start: 2017-02-13

## 2017-02-13 RX ORDER — HEPARIN 100 UNIT/ML
500 SYRINGE INTRAVENOUS
Status: CANCELLED | OUTPATIENT
Start: 2017-02-13

## 2017-02-13 RX ADMIN — DEXAMETHASONE SODIUM PHOSPHATE: 10 INJECTION, SOLUTION INTRAMUSCULAR; INTRAVENOUS at 09:02

## 2017-02-13 RX ADMIN — OXALIPLATIN 277 MG: 50 INJECTION, SOLUTION, CONCENTRATE INTRAVENOUS at 10:02

## 2017-02-13 NOTE — NURSING
Pt tolerated Oxaliplatin. Pt instructed to take 4 tabs of Xeloda in the am and pm ( 500mg tabs). Pt given calendar and verbalized understanding.

## 2017-02-13 NOTE — PLAN OF CARE
Problem: Chemotherapy Effects (Adult)  Goal: Signs and Symptoms of Listed Potential Problems Will be Absent, Minimized or Managed (Chemotherapy Effects)  Signs and symptoms of listed potential problems will be absent, minimized or managed by discharge/transition of care (reference Chemotherapy Effects (Adult) CPG).  Outcome: Ongoing (interventions implemented as appropriate)  Pt will report any changes while on chemo. Reinforced to pt to avoid cold food, fluids or objects.

## 2017-02-24 ENCOUNTER — LAB VISIT (OUTPATIENT)
Dept: LAB | Facility: HOSPITAL | Age: 62
End: 2017-02-24
Attending: INTERNAL MEDICINE
Payer: COMMERCIAL

## 2017-02-24 ENCOUNTER — TELEPHONE (OUTPATIENT)
Dept: HEMATOLOGY/ONCOLOGY | Facility: CLINIC | Age: 62
End: 2017-02-24

## 2017-02-24 DIAGNOSIS — C20 MALIGNANT NEOPLASM OF RECTUM: ICD-10-CM

## 2017-02-24 DIAGNOSIS — C20 MALIGNANT NEOPLASM OF RECTUM: Primary | ICD-10-CM

## 2017-02-24 LAB
ALBUMIN SERPL BCP-MCNC: 3.7 G/DL
ALP SERPL-CCNC: 81 U/L
ALT SERPL W/O P-5'-P-CCNC: 15 U/L
ANION GAP SERPL CALC-SCNC: 8 MMOL/L
AST SERPL-CCNC: 18 U/L
BASOPHILS # BLD AUTO: 0.01 K/UL
BASOPHILS NFR BLD: 0.1 %
BILIRUB SERPL-MCNC: 0.4 MG/DL
BUN SERPL-MCNC: 7 MG/DL
CALCIUM SERPL-MCNC: 8.8 MG/DL
CHLORIDE SERPL-SCNC: 107 MMOL/L
CO2 SERPL-SCNC: 23 MMOL/L
CREAT SERPL-MCNC: 0.8 MG/DL
DIFFERENTIAL METHOD: ABNORMAL
EOSINOPHIL # BLD AUTO: 0.1 K/UL
EOSINOPHIL NFR BLD: 1.4 %
ERYTHROCYTE [DISTWIDTH] IN BLOOD BY AUTOMATED COUNT: 14 %
EST. GFR  (AFRICAN AMERICAN): >60 ML/MIN/1.73 M^2
EST. GFR  (NON AFRICAN AMERICAN): >60 ML/MIN/1.73 M^2
GLUCOSE SERPL-MCNC: 106 MG/DL
HCT VFR BLD AUTO: 43.9 %
HGB BLD-MCNC: 15.2 G/DL
LYMPHOCYTES # BLD AUTO: 1.6 K/UL
LYMPHOCYTES NFR BLD: 22.9 %
MCH RBC QN AUTO: 31.1 PG
MCHC RBC AUTO-ENTMCNC: 34.6 %
MCV RBC AUTO: 90 FL
MONOCYTES # BLD AUTO: 0.8 K/UL
MONOCYTES NFR BLD: 11.7 %
NEUTROPHILS # BLD AUTO: 4.6 K/UL
NEUTROPHILS NFR BLD: 63.6 %
PLATELET # BLD AUTO: 287 K/UL
PMV BLD AUTO: 9 FL
POTASSIUM SERPL-SCNC: 4 MMOL/L
PROT SERPL-MCNC: 7 G/DL
RBC # BLD AUTO: 4.88 M/UL
SODIUM SERPL-SCNC: 138 MMOL/L
WBC # BLD AUTO: 7.17 K/UL

## 2017-02-24 PROCEDURE — 80074 ACUTE HEPATITIS PANEL: CPT

## 2017-02-24 PROCEDURE — 80053 COMPREHEN METABOLIC PANEL: CPT

## 2017-02-24 PROCEDURE — 36415 COLL VENOUS BLD VENIPUNCTURE: CPT

## 2017-02-24 PROCEDURE — 85025 COMPLETE CBC W/AUTO DIFF WBC: CPT

## 2017-02-27 LAB
HAV IGM SERPL QL IA: NEGATIVE
HBV CORE IGM SERPL QL IA: NEGATIVE
HBV SURFACE AG SERPL QL IA: NEGATIVE
HCV AB SERPL QL IA: NEGATIVE

## 2017-03-01 ENCOUNTER — TELEPHONE (OUTPATIENT)
Dept: PHARMACY | Facility: CLINIC | Age: 62
End: 2017-03-01

## 2017-03-02 ENCOUNTER — OFFICE VISIT (OUTPATIENT)
Dept: HEMATOLOGY/ONCOLOGY | Facility: CLINIC | Age: 62
End: 2017-03-02
Payer: COMMERCIAL

## 2017-03-02 VITALS
OXYGEN SATURATION: 97 % | DIASTOLIC BLOOD PRESSURE: 82 MMHG | TEMPERATURE: 99 F | BODY MASS INDEX: 23.46 KG/M2 | WEIGHT: 188.69 LBS | HEART RATE: 100 BPM | SYSTOLIC BLOOD PRESSURE: 138 MMHG | HEIGHT: 75 IN

## 2017-03-02 DIAGNOSIS — Z51.11 ENCOUNTER FOR CHEMOTHERAPY MANAGEMENT: ICD-10-CM

## 2017-03-02 DIAGNOSIS — C20 RECTAL CARCINOMA: Primary | ICD-10-CM

## 2017-03-02 PROCEDURE — 1160F RVW MEDS BY RX/DR IN RCRD: CPT | Mod: S$GLB,,, | Performed by: INTERNAL MEDICINE

## 2017-03-02 PROCEDURE — 99214 OFFICE O/P EST MOD 30 MIN: CPT | Mod: S$GLB,,, | Performed by: INTERNAL MEDICINE

## 2017-03-02 PROCEDURE — 99999 PR PBB SHADOW E&M-EST. PATIENT-LVL III: CPT | Mod: PBBFAC,,, | Performed by: INTERNAL MEDICINE

## 2017-03-02 PROCEDURE — 3079F DIAST BP 80-89 MM HG: CPT | Mod: S$GLB,,, | Performed by: INTERNAL MEDICINE

## 2017-03-02 PROCEDURE — 3075F SYST BP GE 130 - 139MM HG: CPT | Mod: S$GLB,,, | Performed by: INTERNAL MEDICINE

## 2017-03-02 RX ORDER — CAPECITABINE 150 MG/1
1250 TABLET, FILM COATED ORAL
COMMUNITY
Start: 2017-01-27 | End: 2017-05-09 | Stop reason: DRUGHIGH

## 2017-03-02 RX ORDER — HEPARIN 100 UNIT/ML
500 SYRINGE INTRAVENOUS
Status: CANCELLED | OUTPATIENT
Start: 2017-03-06

## 2017-03-02 RX ORDER — SODIUM CHLORIDE 0.9 % (FLUSH) 0.9 %
10 SYRINGE (ML) INJECTION
Status: CANCELLED | OUTPATIENT
Start: 2017-03-06

## 2017-03-02 NOTE — PROGRESS NOTES
Subjective:       Patient ID: Tr Chen is a 62 y.o. male.    Chief Complaint: Follow-up (pre-chemo)  Diagnosis:  Rectal cancer.    HPI     HISTORY OF PRESENT ILLNESS:  The patient is a 61-year-old gentleman with past medical history of colonic polyps, hypertension, tobacco abuse, seen today in consultation for recently diagnosed rectosigmoid adenocarcinoma.  The patient reports intermittent rectal bleeding for the past 3 to 4 months.  He subsequently underwent a colonoscopy on 11/21/2016, which revealed polyps noted in the descending colon, sigmoid colon, and rectum and also noted for a malignant-appearing partially obstructing tumor in the distal sigmoid colon with ulceration. Pathology of the rectosigmoid mass revealed a tubulovillous adenoma and polyps benign..The patient reports he underwent a colonoscopy during his 40s for rectal bleeding.  He was diagnosed with a large bleeding polyp, for which he underwent removal.  He did not undergo surveillance colonoscopies as advised.  No family history of colon cancer.  CT of the abdomen and pelvis on 11/22/2016 revealed thickening of the rectal wall, findings, rectosigmoid neoplasm at the rectosigmoid junction and numerous hepatic lesions compatible with hepatic cysts. He  subsequently underwent a rectal sigmoidectomy on 12/27/2016 .  Pathology revealed an adenocarcinoma, low grade, clear margins. The patient underwent removal of 24 lymph nodes with 0 lymph nodes involved.  Evidence of tumor deposits noted with pathologic staging pT3, pN1c.      1/27/2017 PET/CT imaging  reveals   hepatic metastatic lesion  He subsequently underwent CT guided liver bx 2/3/2017 - NEG for malignancy     He is s/p XELOX cycle 1   He reports tingling in upper ext x 3 days s/p cycle 1   No signs/sx's HFS (hand foot syndrome)   No SOB/CP/N/V   No fevers    PAST MEDICAL HISTORY:  Erectile dysfunction, colonic polyps, hypertension, tobacco abuse disorder, chronic pain.    PAST SURGICAL  "HISTORY:  As above.    SOCIAL HISTORY:  He is a former smoker.  He has a greater than 40-pack-year.  He drinks socially.  He is employed as an  for [employer]  .    FAMILY HISTORY:  Mom diagnosed with breast cancer at age 61.  Dad  at age 87 from Alzheimer's.  He has one sibling, brother in overall good health.        Review of Systems   Constitutional: Negative for appetite change, fatigue, fever and unexpected weight change.   HENT: Negative for mouth sores and nosebleeds.    Eyes: Negative for visual disturbance.   Respiratory: Negative for cough and shortness of breath.    Cardiovascular: Negative for chest pain and leg swelling.   Gastrointestinal: Negative for abdominal pain, blood in stool, constipation, diarrhea, nausea and vomiting.   Genitourinary: Negative for frequency and hematuria.   Musculoskeletal: Negative for arthralgias and back pain.   Skin: Negative for rash.   Neurological: Negative for dizziness, weakness, light-headedness, numbness and headaches.   Hematological: Negative for adenopathy.       Objective:       Vitals:    17 0801   BP: 138/82   BP Location: Right arm   Patient Position: Sitting   BP Method: Manual   Pulse: 100   Temp: 99.1 °F (37.3 °C)   TempSrc: Oral   SpO2: 97%   Weight: 85.6 kg (188 lb 11.4 oz)   Height: 6' 3" (1.905 m)       Physical Exam   Constitutional: He is oriented to person, place, and time. He appears well-developed and well-nourished.   HENT:   Head: Normocephalic.   Mouth/Throat: Oropharynx is clear and moist. No oropharyngeal exudate.   Eyes: Conjunctivae are normal. No scleral icterus.   Neck: Normal range of motion. Neck supple. No thyromegaly present.   Cardiovascular: Normal rate, regular rhythm and normal heart sounds.    No murmur heard.  Pulmonary/Chest: Effort normal and breath sounds normal. He has no wheezes. He has no rales.   Abdominal: Soft. Bowel sounds are normal. He exhibits no distension and no mass. There " is no hepatosplenomegaly. There is no tenderness. There is no rebound and no guarding.   Musculoskeletal: Normal range of motion. He exhibits no edema.   Lymphadenopathy:     He has no cervical adenopathy.     He has no axillary adenopathy.        Right: No supraclavicular adenopathy present.        Left: No supraclavicular adenopathy present.   Neurological: He is alert and oriented to person, place, and time. No cranial nerve deficit.   Skin: No rash noted. No erythema.   Psychiatric: He has a normal mood and affect.       1/27/2017 PET/CT reveals   hepatic metastatic lesion  There is PET/CT evidence a hepatic metastatic lesion.    There is uptake seen near the surgical site in the sigmoid colon. At this juncture this may represent post surgical inflammation residual disease cannot be excluded.      CT a/p 2//3/2017  1.  Thickening of the wall of the rectosigmoid junction either from a neoplasm or from inflammatory processes.  2.  Numerous low attenuation hepatic cysts.  3.  Fibrofatty infiltration of the liver.  4.  Spondylosis in the lumbar spine as discussed above.    LIVER BIOPSIES, CORE BIOPSIES-BENIGN HEPATIC PARENCHYMA. THERE IS MILD MACRO VACUOLAR  FATTY CHANGE AND MILD NONSPECIFIC FOCAL CHRONIC TRIADITIS. THERE IS NO EVIDENCE OF  TUMOR OR GRANULOMATA PRESENT. A FOCAL AREA OF NONSPECIFIC FIBROSIS SUGGESTIVE OF  SCAR TISSUE IS NOTED. THERE IS NO EVIDENCE OF MALIGNANCY.  Assessment:       1. Rectal carcinoma    2. Encounter for chemotherapy management        Plan:   Pt clinically stable  61-year-old with recently diagnosed rectosigmoid adenocarcinoma, status post LAR,12/27/2016.pT3 pN1c with 0 of 24 lymph nodes involved with tumor deposits present.    CT imaging studies negative for evidence of distant disease.     PET/CT imaging studies - hepatic lesion  S/p liver bx - benign   S/p BSA 2.1  S/p cycle 1 of XELOX  He will take 1500mg bid Xeloda 3 wks on 1 wk off ( BSA 2.1)   Oxaliplatin 130mg/m2 Day 1 q 21 d      F/u 1 mo with  CBC, CMP prior to f/u         CC: Darrick Ruiz M.D.

## 2017-03-02 NOTE — MR AVS SNAPSHOT
[facility]  [address] [address]  Phone: [phone]                  Tr Chen   3/2/2017 8:00 AM   Office Visit    Description:  Male : 1955   Provider:  Latrice Jones MD   Department:  [facility]           Reason for Visit     Follow-up           Diagnoses this Visit        Comments    Rectal carcinoma    -  Primary     Encounter for chemotherapy management                To Do List           Future Appointments        Provider Department Dept Phone    3/6/2017 9:00 AM CHAIR 05 [facility] [phone]    3/27/2017 9:00 AM CHAIR 01 [facility] [phone]    3/31/2017 8:30 AM LAB[facility] [phone]    4/3/2017 8:00 AM Latrice Jones MD [facility] [phone]      Goals (5 Years of Data)     None      Follow-Up and Disposition     Return in about 1 month (around 2017).      [facility]     [facility] Nurse Care Line -  Assistance  Registered nurses in the [facility] provide clinical advisement, health education, appointment booking, and other advisory services.  Call for this free service at [phone].             Medications           Message regarding Medications     Verify the changes and/or additions to your medication regime listed below are the same as discussed with your clinician today.  If any of these changes or additions are incorrect, please notify your healthcare provider.             Verify that the below list of medications is an accurate representation of the medications you are currently taking.  If none reported, the list may be blank. If incorrect, please contact your healthcare provider. Carry this list with you in case of emergency.           Current Medications     capecitabine (XELODA) 150 MG tablet     prochlorperazine (COMPAZINE) 10 MG tablet Take 1 tablet (10 mg  total) by mouth every 6 (six) hours as needed.    amlodipine (NORVASC) 10 MG tablet Take 1 tablet (10 mg total) by mouth once daily.    bacitracin (ANTIBIOTIC, BACITRACIN ZINC,) 500 unit/gram Oint Apply topically 2 (two) times daily.    hydrocodone-acetaminophen 5-325mg (NORCO) 5-325 mg per tablet Take 1 tablet by mouth every 6 (six) hours as needed for Pain.    montelukast (SINGULAIR) 10 mg tablet Take 1 tablet by mouth once daily.     ondansetron (ZOFRAN) 8 MG tablet Take 1 tablet (8 mg total) by mouth every 12 (twelve) hours as needed for Nausea.    sildenafil (VIAGRA) 100 MG tablet Take 1 tablet (100 mg total) by mouth daily as needed for Erectile Dysfunction. half -1 Tablet Oral .  Take 30 min before anticipated need.    tramadol (ULTRAM) 50 mg tablet Take 50 mg by mouth every 4 (four) hours as needed.            Clinical Reference Information           Your Vitals Were     BP                   138/82 (BP Location: Right arm, Patient Position: Sitting, BP Method: Manual)           Blood Pressure          Most Recent Value    BP  138/82      Allergies as of 3/2/2017     No Known Allergies      Immunizations Administered on Date of Encounter - 3/2/2017     None      Orders Placed During Today's Visit     Future Labs/Procedures Expected by Expires    CBC auto differential  3/2/2017 3/2/2018    CEA  3/2/2017 3/2/2018    Comprehensive metabolic panel  3/2/2017 3/2/2018      Smoking Cessation     If you would like to quit smoking:   You may be eligible for free services if you are a [place] resident and started smoking cigarettes before September 1, 1988.  Call the Smoking Cessation Trust ([employer]) toll free at (236) 637-8506 or (308) 338-9222.   Call 5-827-QUIT-NOW if you do not meet the above criteria.            Language Assistance Services     ATTENTION: Language assistance services are available, free of charge. Please call [phone].      ATENCIÓN: Si kirill peguero, tiene a montano disposición servicios  debbyos de asistencia lingüística. Lauren ramsey [phone].     ADRIAN Ý: N?u b?n nói Ti?ng Vi?t, có các d?ch v? h? tr? ngôn ng? mi?n phí dành cho b?n. G?i s? [phone].         [facility] complies with applicable Federal civil rights laws and does not discriminate on the basis of race, color, national origin, age, disability, or sex.

## 2017-03-06 ENCOUNTER — INFUSION (OUTPATIENT)
Dept: INFUSION THERAPY | Facility: HOSPITAL | Age: 62
End: 2017-03-06
Attending: INTERNAL MEDICINE
Payer: COMMERCIAL

## 2017-03-06 VITALS — RESPIRATION RATE: 16 BRPM | HEART RATE: 97 BPM | SYSTOLIC BLOOD PRESSURE: 147 MMHG | DIASTOLIC BLOOD PRESSURE: 83 MMHG

## 2017-03-06 DIAGNOSIS — C20 RECTAL CARCINOMA: Primary | ICD-10-CM

## 2017-03-06 PROCEDURE — 96415 CHEMO IV INFUSION ADDL HR: CPT

## 2017-03-06 PROCEDURE — 96413 CHEMO IV INFUSION 1 HR: CPT

## 2017-03-06 PROCEDURE — 25000003 PHARM REV CODE 250: Performed by: INTERNAL MEDICINE

## 2017-03-06 PROCEDURE — 63600175 PHARM REV CODE 636 W HCPCS: Performed by: INTERNAL MEDICINE

## 2017-03-06 PROCEDURE — 96367 TX/PROPH/DG ADDL SEQ IV INF: CPT

## 2017-03-06 RX ADMIN — OXALIPLATIN 240 MG: 5 INJECTION, SOLUTION INTRAVENOUS at 10:03

## 2017-03-06 RX ADMIN — DEXAMETHASONE SODIUM PHOSPHATE: 10 INJECTION, SOLUTION INTRAMUSCULAR; INTRAVENOUS at 09:03

## 2017-03-06 NOTE — NURSING
Pt tolerated Oxaliplatin. Reinforced cold precautions. Pt received calendar of when to take Xeloda. Pt verbalized understanding.

## 2017-03-17 ENCOUNTER — TELEPHONE (OUTPATIENT)
Dept: PHARMACY | Facility: CLINIC | Age: 62
End: 2017-03-17

## 2017-03-17 DIAGNOSIS — R11.0 NAUSEA: ICD-10-CM

## 2017-03-17 DIAGNOSIS — C20 RECTAL CANCER: Primary | ICD-10-CM

## 2017-03-17 RX ORDER — CAPECITABINE 500 MG/1
TABLET, FILM COATED ORAL
Qty: 84 TABLET | Refills: 0 | OUTPATIENT
Start: 2017-03-17

## 2017-03-17 RX ORDER — CAPECITABINE 500 MG/1
TABLET, FILM COATED ORAL
Qty: 84 TABLET | Refills: 0 | Status: SHIPPED | OUTPATIENT
Start: 2017-03-17 | End: 2017-04-04 | Stop reason: SDUPTHER

## 2017-03-17 RX ORDER — PROCHLORPERAZINE MALEATE 10 MG
10 TABLET ORAL EVERY 6 HOURS PRN
Qty: 30 TABLET | Refills: 1 | Status: CANCELLED | OUTPATIENT
Start: 2017-03-17 | End: 2018-03-17

## 2017-03-17 RX ORDER — ONDANSETRON HYDROCHLORIDE 8 MG/1
8 TABLET, FILM COATED ORAL EVERY 12 HOURS PRN
Qty: 30 TABLET | Refills: 2 | Status: CANCELLED | OUTPATIENT
Start: 2017-03-17 | End: 2018-03-17

## 2017-03-17 NOTE — TELEPHONE ENCOUNTER
----- Message from ADELAIDA Malcolm sent at 3/17/2017 11:33 AM CDT -----  Good Morning,     This is Gloria from [facility]. We are requesting a refill authorization on patient Tr Chen [id number] xeloda. Patient is currently out of refills and will be starting a new cycle in 1 week. Patient also requesting a prescription for nausea medication.Please send authorization to [facility] @ [phone] if appropriate.    Thank you    Gloria Mathew CPhT.  [facility]

## 2017-03-24 DIAGNOSIS — R11.0 NAUSEA: ICD-10-CM

## 2017-03-24 RX ORDER — PROCHLORPERAZINE MALEATE 10 MG
10 TABLET ORAL EVERY 6 HOURS PRN
Qty: 30 TABLET | Refills: 1 | Status: SHIPPED | OUTPATIENT
Start: 2017-03-24 | End: 2017-04-12 | Stop reason: SDUPTHER

## 2017-03-27 ENCOUNTER — OFFICE VISIT (OUTPATIENT)
Dept: HEMATOLOGY/ONCOLOGY | Facility: CLINIC | Age: 62
End: 2017-03-27
Payer: COMMERCIAL

## 2017-03-27 ENCOUNTER — INFUSION (OUTPATIENT)
Dept: INFUSION THERAPY | Facility: HOSPITAL | Age: 62
End: 2017-03-27
Attending: INTERNAL MEDICINE
Payer: COMMERCIAL

## 2017-03-27 ENCOUNTER — TELEPHONE (OUTPATIENT)
Dept: HEMATOLOGY/ONCOLOGY | Facility: CLINIC | Age: 62
End: 2017-03-27

## 2017-03-27 VITALS
DIASTOLIC BLOOD PRESSURE: 68 MMHG | HEIGHT: 75 IN | OXYGEN SATURATION: 96 % | HEART RATE: 79 BPM | WEIGHT: 191.38 LBS | TEMPERATURE: 99 F | BODY MASS INDEX: 23.8 KG/M2 | SYSTOLIC BLOOD PRESSURE: 136 MMHG

## 2017-03-27 VITALS — DIASTOLIC BLOOD PRESSURE: 74 MMHG | SYSTOLIC BLOOD PRESSURE: 136 MMHG | HEART RATE: 90 BPM | RESPIRATION RATE: 16 BRPM

## 2017-03-27 DIAGNOSIS — C20 MALIGNANT NEOPLASM OF RECTUM: Primary | ICD-10-CM

## 2017-03-27 DIAGNOSIS — C20 RECTAL CARCINOMA: Primary | ICD-10-CM

## 2017-03-27 DIAGNOSIS — Z51.11 ENCOUNTER FOR CHEMOTHERAPY MANAGEMENT: ICD-10-CM

## 2017-03-27 PROCEDURE — 1160F RVW MEDS BY RX/DR IN RCRD: CPT | Mod: S$GLB,,, | Performed by: INTERNAL MEDICINE

## 2017-03-27 PROCEDURE — 96367 TX/PROPH/DG ADDL SEQ IV INF: CPT

## 2017-03-27 PROCEDURE — 3075F SYST BP GE 130 - 139MM HG: CPT | Mod: S$GLB,,, | Performed by: INTERNAL MEDICINE

## 2017-03-27 PROCEDURE — 99999 PR PBB SHADOW E&M-EST. PATIENT-LVL III: CPT | Mod: PBBFAC,,, | Performed by: INTERNAL MEDICINE

## 2017-03-27 PROCEDURE — 3078F DIAST BP <80 MM HG: CPT | Mod: S$GLB,,, | Performed by: INTERNAL MEDICINE

## 2017-03-27 PROCEDURE — 96415 CHEMO IV INFUSION ADDL HR: CPT

## 2017-03-27 PROCEDURE — 96413 CHEMO IV INFUSION 1 HR: CPT

## 2017-03-27 PROCEDURE — 99214 OFFICE O/P EST MOD 30 MIN: CPT | Mod: S$GLB,,, | Performed by: INTERNAL MEDICINE

## 2017-03-27 PROCEDURE — 25000003 PHARM REV CODE 250: Performed by: INTERNAL MEDICINE

## 2017-03-27 PROCEDURE — 63600175 PHARM REV CODE 636 W HCPCS: Performed by: INTERNAL MEDICINE

## 2017-03-27 RX ORDER — HEPARIN 100 UNIT/ML
500 SYRINGE INTRAVENOUS
Status: CANCELLED | OUTPATIENT
Start: 2017-03-27

## 2017-03-27 RX ORDER — SODIUM CHLORIDE 0.9 % (FLUSH) 0.9 %
10 SYRINGE (ML) INJECTION
Status: CANCELLED | OUTPATIENT
Start: 2017-03-27

## 2017-03-27 RX ADMIN — PALONOSETRON HYDROCHLORIDE: 0.25 INJECTION INTRAVENOUS at 10:03

## 2017-03-27 RX ADMIN — OXALIPLATIN 200 MG: 50 INJECTION, SOLUTION, CONCENTRATE INTRAVENOUS at 10:03

## 2017-03-27 NOTE — PLAN OF CARE
Problem: Patient Care Overview  Goal: Plan of Care Review  Outcome: Ongoing (interventions implemented as appropriate)  Reinforced cold precautions of food, liquid and objects.

## 2017-03-27 NOTE — PROGRESS NOTES
Subjective:       Patient ID: Tr Chen is a 62 y.o. male.    Chief Complaint: Follow-up  Diagnosis:  Rectal cancer.    HPI     HISTORY OF PRESENT ILLNESS:  The patient is a 61-year-old gentleman with past medical history of colonic polyps, hypertension, tobacco abuse, seen today in consultation for recently diagnosed rectosigmoid adenocarcinoma.  The patient reports intermittent rectal bleeding for the past 3 to 4 months.  He subsequently underwent a colonoscopy on 11/21/2016, which revealed polyps noted in the descending colon, sigmoid colon, and rectum and also noted for a malignant-appearing partially obstructing tumor in the distal sigmoid colon with ulceration. Pathology of the rectosigmoid mass revealed a tubulovillous adenoma and polyps benign..The patient reports he underwent a colonoscopy during his 40s for rectal bleeding.  He was diagnosed with a large bleeding polyp, for which he underwent removal.  He did not undergo surveillance colonoscopies as advised.  No family history of colon cancer.  CT of the abdomen and pelvis on 11/22/2016 revealed thickening of the rectal wall, findings, rectosigmoid neoplasm at the rectosigmoid junction and numerous hepatic lesions compatible with hepatic cysts. He  subsequently underwent a rectal sigmoidectomy on 12/27/2016 .  Pathology revealed an adenocarcinoma, low grade, clear margins. The patient underwent removal of 24 lymph nodes with 0 lymph nodes involved.  Evidence of tumor deposits noted with pathologic staging pT3, pN1c.      1/27/2017 PET/CT imaging  reveals   hepatic metastatic lesion  He subsequently underwent CT guided liver bx 2/3/2017 - NEG for malignancy     He is s/p XELOX cycle 2  He reports intermittent tingling hands -stable  No signs/sx's HFS (hand foot syndrome)   No SOB/CP/N/V   No fevers    PAST MEDICAL HISTORY:  Erectile dysfunction, colonic polyps, hypertension, tobacco abuse disorder, chronic pain.    PAST SURGICAL HISTORY:  As  "above.    SOCIAL HISTORY:  He is a former smoker.  He has a greater than 40-pack-year.  He drinks socially.  He is employed as an  for [employer]  .    FAMILY HISTORY:  Mom diagnosed with breast cancer at age 61.  Dad  at age 87 from Alzheimer's.  He has one sibling, brother in overall good health.        Review of Systems   Constitutional: Negative for appetite change, fatigue, fever and unexpected weight change.   HENT: Negative for mouth sores and nosebleeds.    Eyes: Negative for visual disturbance.   Respiratory: Negative for cough and shortness of breath.    Cardiovascular: Negative for chest pain and leg swelling.   Gastrointestinal: Negative for abdominal pain, blood in stool, constipation, diarrhea, nausea and vomiting.   Genitourinary: Negative for frequency and hematuria.   Musculoskeletal: Negative for arthralgias and back pain.   Skin: Negative for rash.   Neurological: Negative for dizziness, weakness, light-headedness, numbness and headaches.   Hematological: Negative for adenopathy.       Objective:       Vitals:    17 0920   BP: 136/68   BP Location: Right arm   Patient Position: Sitting   BP Method: Manual   Pulse: 79   Temp: 98.5 °F (36.9 °C)   TempSrc: Oral   SpO2: 96%   Weight: 86.8 kg (191 lb 5.8 oz)   Height: 6' 3" (1.905 m)       Physical Exam   Constitutional: He is oriented to person, place, and time. He appears well-developed and well-nourished.   HENT:   Head: Normocephalic.   Mouth/Throat: Oropharynx is clear and moist. No oropharyngeal exudate.   Eyes: Conjunctivae are normal. No scleral icterus.   Neck: Normal range of motion. Neck supple. No thyromegaly present.   Cardiovascular: Normal rate, regular rhythm and normal heart sounds.    No murmur heard.  Pulmonary/Chest: Effort normal and breath sounds normal. He has no wheezes. He has no rales.   Abdominal: Soft. Bowel sounds are normal. He exhibits no distension and no mass. There is no " hepatosplenomegaly. There is no tenderness. There is no rebound and no guarding.   Musculoskeletal: Normal range of motion. He exhibits no edema.   Lymphadenopathy:     He has no cervical adenopathy.     He has no axillary adenopathy.        Right: No supraclavicular adenopathy present.        Left: No supraclavicular adenopathy present.   Neurological: He is alert and oriented to person, place, and time. No cranial nerve deficit.   Skin: No rash noted. No erythema.   Psychiatric: He has a normal mood and affect.       1/27/2017 PET/CT reveals   hepatic metastatic lesion      There is uptake seen near the surgical site in the sigmoid colon. At this juncture this may represent post surgical inflammation residual disease cannot be excluded.      CT a/p 2//3/2017  1.  Thickening of the wall of the rectosigmoid junction either from a neoplasm or from inflammatory processes.  2.  Numerous low attenuation hepatic cysts.  3.  Fibrofatty infiltration of the liver.  4.  Spondylosis in the lumbar spine as discussed above.    LIVER BIOPSIES, CORE BIOPSIES-BENIGN HEPATIC PARENCHYMA. THERE IS MILD MACRO VACUOLAR  FATTY CHANGE AND MILD NONSPECIFIC FOCAL CHRONIC TRIADITIS. THERE IS NO EVIDENCE OF  TUMOR OR GRANULOMATA PRESENT. A FOCAL AREA OF NONSPECIFIC FIBROSIS SUGGESTIVE OF  SCAR TISSUE IS NOTED. THERE IS NO EVIDENCE OF MALIGNANCY.  Assessment:       1. Rectal carcinoma    2. Encounter for chemotherapy management        Plan:   Pt clinically stable  61-year-old with recently diagnosed rectosigmoid adenocarcinoma, status post LAR,12/27/2016.pT3 pN1c with 0 of 24 lymph nodes involved with tumor deposits present.    CT imaging studies negative for evidence of distant disease.     PET/CT imaging studies - hepatic lesion  S/p liver bx - benign   S/p cycle 2 of XELOX  Proceed with boone 3   He will take 1500mg bid Xeloda 2 wks on 1 wk off ( BSA 2.1)   Oxaliplatin 130mg/m2 Day 1 q 21 d     Plan to complete 4 cycles of XELOX  Followed  by chemo/RT    F/u 3 wks with  CBC, CMP prior to f/u         CC: Darrick Ruiz M.D.

## 2017-03-27 NOTE — NURSING
Pt tolerated chemotherapy. No reactions noted. Pt received discharge instructions and verbalized understanding.

## 2017-04-04 ENCOUNTER — TELEPHONE (OUTPATIENT)
Dept: PHARMACY | Facility: CLINIC | Age: 62
End: 2017-04-04

## 2017-04-04 DIAGNOSIS — C20 RECTAL CANCER: ICD-10-CM

## 2017-04-04 RX ORDER — CAPECITABINE 500 MG/1
TABLET, FILM COATED ORAL
Qty: 84 TABLET | Refills: 0 | Status: SHIPPED | OUTPATIENT
Start: 2017-04-04 | End: 2017-05-09 | Stop reason: DRUGHIGH

## 2017-04-04 RX ORDER — CAPECITABINE 500 MG/1
TABLET, FILM COATED ORAL
Qty: 84 TABLET | Refills: 0 | Status: SHIPPED | OUTPATIENT
Start: 2017-04-04 | End: 2017-04-04 | Stop reason: SDUPTHER

## 2017-04-11 ENCOUNTER — LAB VISIT (OUTPATIENT)
Dept: LAB | Facility: HOSPITAL | Age: 62
End: 2017-04-11
Attending: INTERNAL MEDICINE
Payer: COMMERCIAL

## 2017-04-11 DIAGNOSIS — C20 MALIGNANT NEOPLASM OF RECTUM: ICD-10-CM

## 2017-04-11 LAB
ALBUMIN SERPL BCP-MCNC: 3.8 G/DL
ALP SERPL-CCNC: 65 U/L
ALT SERPL W/O P-5'-P-CCNC: 18 U/L
ANION GAP SERPL CALC-SCNC: 9 MMOL/L
AST SERPL-CCNC: 24 U/L
BASOPHILS # BLD AUTO: 0.03 K/UL
BASOPHILS NFR BLD: 0.5 %
BILIRUB SERPL-MCNC: 0.4 MG/DL
BUN SERPL-MCNC: 7 MG/DL
CALCIUM SERPL-MCNC: 8.7 MG/DL
CHLORIDE SERPL-SCNC: 107 MMOL/L
CO2 SERPL-SCNC: 22 MMOL/L
CREAT SERPL-MCNC: 0.8 MG/DL
DIFFERENTIAL METHOD: ABNORMAL
EOSINOPHIL # BLD AUTO: 0.1 K/UL
EOSINOPHIL NFR BLD: 1.2 %
ERYTHROCYTE [DISTWIDTH] IN BLOOD BY AUTOMATED COUNT: 19 %
EST. GFR  (AFRICAN AMERICAN): >60 ML/MIN/1.73 M^2
EST. GFR  (NON AFRICAN AMERICAN): >60 ML/MIN/1.73 M^2
GLUCOSE SERPL-MCNC: 112 MG/DL
HCT VFR BLD AUTO: 40.5 %
HGB BLD-MCNC: 14 G/DL
LYMPHOCYTES # BLD AUTO: 1.3 K/UL
LYMPHOCYTES NFR BLD: 20.4 %
MCH RBC QN AUTO: 33.1 PG
MCHC RBC AUTO-ENTMCNC: 34.6 %
MCV RBC AUTO: 96 FL
MONOCYTES # BLD AUTO: 0.8 K/UL
MONOCYTES NFR BLD: 12 %
NEUTROPHILS # BLD AUTO: 4.3 K/UL
NEUTROPHILS NFR BLD: 65.6 %
PLATELET # BLD AUTO: 223 K/UL
PMV BLD AUTO: 8.7 FL
POTASSIUM SERPL-SCNC: 4.1 MMOL/L
PROT SERPL-MCNC: 6.7 G/DL
RBC # BLD AUTO: 4.23 M/UL
SODIUM SERPL-SCNC: 138 MMOL/L
WBC # BLD AUTO: 6.52 K/UL

## 2017-04-11 PROCEDURE — 82378 CARCINOEMBRYONIC ANTIGEN: CPT

## 2017-04-11 PROCEDURE — 36415 COLL VENOUS BLD VENIPUNCTURE: CPT

## 2017-04-11 PROCEDURE — 85025 COMPLETE CBC W/AUTO DIFF WBC: CPT

## 2017-04-11 PROCEDURE — 80053 COMPREHEN METABOLIC PANEL: CPT

## 2017-04-11 RX ORDER — CAPECITABINE 150 MG/1
TABLET, FILM COATED ORAL
Qty: 42 TABLET | Refills: 0 | OUTPATIENT
Start: 2017-04-11

## 2017-04-11 RX ORDER — CAPECITABINE 500 MG/1
TABLET, FILM COATED ORAL
Qty: 112 TABLET | Refills: 0 | OUTPATIENT
Start: 2017-04-11

## 2017-04-12 ENCOUNTER — OFFICE VISIT (OUTPATIENT)
Dept: HEMATOLOGY/ONCOLOGY | Facility: CLINIC | Age: 62
End: 2017-04-12
Payer: COMMERCIAL

## 2017-04-12 VITALS
HEART RATE: 86 BPM | HEIGHT: 75 IN | OXYGEN SATURATION: 96 % | TEMPERATURE: 99 F | SYSTOLIC BLOOD PRESSURE: 122 MMHG | DIASTOLIC BLOOD PRESSURE: 84 MMHG | WEIGHT: 191.56 LBS | BODY MASS INDEX: 23.82 KG/M2

## 2017-04-12 DIAGNOSIS — R11.0 NAUSEA: ICD-10-CM

## 2017-04-12 DIAGNOSIS — C20 RECTAL CARCINOMA: Primary | ICD-10-CM

## 2017-04-12 DIAGNOSIS — Z51.11 ENCOUNTER FOR CHEMOTHERAPY MANAGEMENT: ICD-10-CM

## 2017-04-12 LAB — CEA SERPL-MCNC: 8.9 NG/ML

## 2017-04-12 PROCEDURE — 1160F RVW MEDS BY RX/DR IN RCRD: CPT | Mod: S$GLB,,, | Performed by: INTERNAL MEDICINE

## 2017-04-12 PROCEDURE — 99999 PR PBB SHADOW E&M-EST. PATIENT-LVL III: CPT | Mod: PBBFAC,,, | Performed by: INTERNAL MEDICINE

## 2017-04-12 PROCEDURE — 3074F SYST BP LT 130 MM HG: CPT | Mod: S$GLB,,, | Performed by: INTERNAL MEDICINE

## 2017-04-12 PROCEDURE — 3079F DIAST BP 80-89 MM HG: CPT | Mod: S$GLB,,, | Performed by: INTERNAL MEDICINE

## 2017-04-12 PROCEDURE — 99214 OFFICE O/P EST MOD 30 MIN: CPT | Mod: S$GLB,,, | Performed by: INTERNAL MEDICINE

## 2017-04-12 RX ORDER — HEPARIN 100 UNIT/ML
500 SYRINGE INTRAVENOUS
Status: CANCELLED | OUTPATIENT
Start: 2017-04-17

## 2017-04-12 RX ORDER — PROCHLORPERAZINE MALEATE 10 MG
10 TABLET ORAL EVERY 6 HOURS PRN
Qty: 30 TABLET | Refills: 1 | Status: SHIPPED | OUTPATIENT
Start: 2017-04-12 | End: 2017-05-01

## 2017-04-12 RX ORDER — SODIUM CHLORIDE 0.9 % (FLUSH) 0.9 %
10 SYRINGE (ML) INJECTION
Status: CANCELLED | OUTPATIENT
Start: 2017-04-17

## 2017-04-12 NOTE — TELEPHONE ENCOUNTER
patient confirmed need of refill for xeloda and states that he is off this week and request we ship his refill to him sometime this week, rx will ship 4/13 with consent,did not mention how many doses on hand, no missed doses, no new medications or allergies,$5 copay confirmed    Katie Hondroulis [facility]- Refill Technician  Phone: [phone]

## 2017-04-12 NOTE — MR AVS SNAPSHOT
[facility] [place]  [address] [facility] [address] [address]  Phone: [phone]                  Tr Chen   2017 3:00 PM   Office Visit    Description:  Male : 1955   Provider:  Latrice Jones MD   Department:  [facility]           Reason for Visit     Follow-up           Diagnoses this Visit        Comments    Rectal carcinoma    -  Primary     Encounter for chemotherapy management                To Do List           Future Appointments        Provider Department Dept Phone    2017 8:30 AM CHAIR 01 [facility] [phone]    2017 9:20 AM Cornelius Bragg MD [facility] [phone]    2017 2:30 PM Latrice Jones MD [facility] [phone]      Goals (5 Years of Data)     None      Follow-Up and Disposition     Return in about 1 month (around 2017).      [facility]     [facility] Nurse Care Line -  Assistance  Unless otherwise directed by your provider, please contact [facility], our nurse care line that is available for  assistance.     Registered nurses in the [facility] provide: appointment scheduling, clinical advisement, health education, and other advisory services.  Call: [phone] (toll free)               Medications           Message regarding Medications     Verify the changes and/or additions to your medication regime listed below are the same as discussed with your clinician today.  If any of these changes or additions are incorrect, please notify your healthcare provider.             Verify that the below list of medications is an accurate representation of the medications you are currently taking.  If none reported, the list may be blank. If incorrect, please contact your healthcare provider. Carry this list with you in case of emergency.           Current Medications     amlodipine (NORVASC) 10 MG tablet Take 1 tablet  "(10 mg total) by mouth once daily.    bacitracin (ANTIBIOTIC, BACITRACIN ZINC,) 500 unit/gram Oint Apply topically 2 (two) times daily.    capecitabine (XELODA) 150 MG tablet     capecitabine (XELODA) 500 MG Tab TAKE 3 TABLETS (1500MG TOTAL) BY MOUTH TWO TIMES A DAY ON DAYS 1-14 OF 21 DAY CYCLE    hydrocodone-acetaminophen 5-325mg (NORCO) 5-325 mg per tablet Take 1 tablet by mouth every 6 (six) hours as needed for Pain.    ondansetron (ZOFRAN) 8 MG tablet Take 1 tablet (8 mg total) by mouth every 12 (twelve) hours as needed for Nausea.    prochlorperazine (COMPAZINE) 10 MG tablet Take 1 tablet (10 mg total) by mouth every 6 (six) hours as needed.    sildenafil (VIAGRA) 100 MG tablet Take 1 tablet (100 mg total) by mouth daily as needed for Erectile Dysfunction. half -1 Tablet Oral .  Take 30 min before anticipated need.           Clinical Reference Information           Your Vitals Were     BP Pulse Temp Height Weight SpO2    122/84 (BP Location: Right arm, Patient Position: Sitting, BP Method: Manual) 86 99.1 °F (37.3 °C) (Oral) 6' 3" (1.905 m) 86.9 kg (191 lb 9.3 oz) 96%    BMI                23.95 kg/m2          Blood Pressure          Most Recent Value    BP  122/84      Allergies as of 4/12/2017     No Known Allergies      Immunizations Administered on Date of Encounter - 4/12/2017     None      Smoking Cessation     If you would like to quit smoking:   You may be eligible for free services if you are a [place] resident and started smoking cigarettes before September 1, 1988.  Call the Smoking Cessation Trust (SCT) toll free at (253) 293-0704 or (613) 168-0889.   Call 1-800-QUIT-NOW if you do not meet the above criteria.   Contact us via email: [email or web address]   View our website for more information: [email or web address]        Language Assistance Services     ATTENTION: Language assistance services are available, free of charge. Please call [phone].      ATENCIÓN: Si kirill peguero, " tiene a montano disposición servicios gratuitos de asistencia lingüística. [place] al [phone].     ADRIAN Ý: N?u b?n nói Ti?ng Vi?t, có các d?ch v? h? tr? ngôn ng? mi?n phí dành cho b?n. G?i s? [phone].         [facility] complies with applicable Federal civil rights laws and does not discriminate on the basis of race, color, national origin, age, disability, or sex.

## 2017-04-17 ENCOUNTER — INFUSION (OUTPATIENT)
Dept: INFUSION THERAPY | Facility: HOSPITAL | Age: 62
End: 2017-04-17
Attending: INTERNAL MEDICINE
Payer: COMMERCIAL

## 2017-04-17 VITALS
HEART RATE: 86 BPM | RESPIRATION RATE: 16 BRPM | TEMPERATURE: 98 F | DIASTOLIC BLOOD PRESSURE: 76 MMHG | SYSTOLIC BLOOD PRESSURE: 133 MMHG

## 2017-04-17 DIAGNOSIS — C20 RECTAL CARCINOMA: Primary | ICD-10-CM

## 2017-04-17 PROCEDURE — 96413 CHEMO IV INFUSION 1 HR: CPT

## 2017-04-17 PROCEDURE — 96415 CHEMO IV INFUSION ADDL HR: CPT

## 2017-04-17 PROCEDURE — 63600175 PHARM REV CODE 636 W HCPCS: Performed by: INTERNAL MEDICINE

## 2017-04-17 PROCEDURE — 25000003 PHARM REV CODE 250: Performed by: INTERNAL MEDICINE

## 2017-04-17 PROCEDURE — 96367 TX/PROPH/DG ADDL SEQ IV INF: CPT

## 2017-04-17 RX ADMIN — PALONOSETRON HYDROCHLORIDE: 0.25 INJECTION INTRAVENOUS at 08:04

## 2017-04-17 RX ADMIN — OXALIPLATIN 240 MG: 5 INJECTION, SOLUTION INTRAVENOUS at 09:04

## 2017-04-17 NOTE — NURSING
Patient received Oxaliplatin. Tolerated well. No reactions noted. Patient received discharge instructions and verbalized understanding.

## 2017-04-27 ENCOUNTER — TELEPHONE (OUTPATIENT)
Dept: PHARMACY | Facility: CLINIC | Age: 62
End: 2017-04-27

## 2017-05-01 ENCOUNTER — OFFICE VISIT (OUTPATIENT)
Dept: FAMILY MEDICINE | Facility: CLINIC | Age: 62
End: 2017-05-01
Payer: COMMERCIAL

## 2017-05-01 VITALS
TEMPERATURE: 99 F | WEIGHT: 191.13 LBS | HEIGHT: 75 IN | DIASTOLIC BLOOD PRESSURE: 88 MMHG | HEART RATE: 76 BPM | BODY MASS INDEX: 23.77 KG/M2 | SYSTOLIC BLOOD PRESSURE: 132 MMHG | OXYGEN SATURATION: 97 %

## 2017-05-01 DIAGNOSIS — G89.29 OTHER CHRONIC PAIN: ICD-10-CM

## 2017-05-01 DIAGNOSIS — C20 RECTAL CARCINOMA: Primary | ICD-10-CM

## 2017-05-01 DIAGNOSIS — F52.32 ANORGASMIA OF MALE: ICD-10-CM

## 2017-05-01 PROCEDURE — 3079F DIAST BP 80-89 MM HG: CPT | Mod: S$GLB,,, | Performed by: INTERNAL MEDICINE

## 2017-05-01 PROCEDURE — 1160F RVW MEDS BY RX/DR IN RCRD: CPT | Mod: S$GLB,,, | Performed by: INTERNAL MEDICINE

## 2017-05-01 PROCEDURE — 99214 OFFICE O/P EST MOD 30 MIN: CPT | Mod: S$GLB,,, | Performed by: INTERNAL MEDICINE

## 2017-05-01 PROCEDURE — 3075F SYST BP GE 130 - 139MM HG: CPT | Mod: S$GLB,,, | Performed by: INTERNAL MEDICINE

## 2017-05-01 PROCEDURE — 99999 PR PBB SHADOW E&M-EST. PATIENT-LVL III: CPT | Mod: PBBFAC,,, | Performed by: INTERNAL MEDICINE

## 2017-05-01 RX ORDER — HYDROCODONE BITARTRATE AND ACETAMINOPHEN 5; 325 MG/1; MG/1
1 TABLET ORAL EVERY 6 HOURS PRN
Qty: 120 TABLET | Refills: 0 | Status: SHIPPED | OUTPATIENT
Start: 2017-05-01 | End: 2017-08-01 | Stop reason: SDUPTHER

## 2017-05-01 RX ORDER — HYDROCODONE BITARTRATE AND ACETAMINOPHEN 5; 325 MG/1; MG/1
1 TABLET ORAL EVERY 6 HOURS PRN
Qty: 120 TABLET | Refills: 0 | Status: SHIPPED | OUTPATIENT
Start: 2017-07-01 | End: 2017-08-01 | Stop reason: SDUPTHER

## 2017-05-01 RX ORDER — HYDROCODONE BITARTRATE AND ACETAMINOPHEN 5; 325 MG/1; MG/1
1 TABLET ORAL EVERY 6 HOURS PRN
Qty: 120 TABLET | Refills: 0 | Status: SHIPPED | OUTPATIENT
Start: 2017-06-01 | End: 2017-08-01 | Stop reason: SDUPTHER

## 2017-05-01 NOTE — PROGRESS NOTES
Chief complaint refills and follow-up from colon cancer    62-year-old white male who finally did have his needed colonoscopy whereupon colon cancer was found.  He recently had a low anterior resection of the surgery notes were reviewed and appears he was doing well.  He's here for his refills of his chronic a medication which was mostly due to his activity with activity related worsening of his arthritis which was particularly in both ankles. His stepdaughter is an oncologist in [place].  Interval records reviewed.  He did have a positive finding in his liver but the biopsy was negative.  He is undergoing chemotherapy at this point..  Plans to have radiation treatment as well and we discussed a long discussion with radiation oncology say can be aware of all future potential side effects.  He continues with the anorgasmia which she had had for the first time when I saw him last time and reassured him at that point he was not on chemotherapy so is unlikely related to his chemotherapy and he did not have similar problems while on pain medication.  We discussed it may well be psychological secondary to the recent stressors.  He has no erectile dysfunction.  He needs to be aware of potential sexual side effects with the radiation which will involve the pelvic area and he will discuss that with radiation oncology.  Currently now that he's not working his ankle arthritis is not as painful release taking about 2 pills per day and we discussed a continued always attempting to reduce his overall amount of pain medication taken to reduce tolerance, dependency and so forth.  he will do so but we will keep his prescriptions the same so that he can have a supply on hand for when he goes back to work.  She does plan to return to full work and cannot wait to get back to work.  Patient counseled at length regarding all these issuesTotal time over 25 minutes with over 50% counseling.      PAST MEDICAL HISTORY:   1. ED.   2. HTN.    3. Smoker.   4. Allergic rhinitis with exposure to mold   5. Frequent bronchitis   6. Lumbar strain due to work on [employer]   7. Large colon polyp removed before age 50 - over 10-15 -yrs  8.  Chronic pain referable to ankle arthritis   9.  Colon cancer 2016 with low anterior resection    Social history: Still works both one pack per day, no alcohol previously  with kids, works on the railroad     Family history: Mom  of breast cancer, father  with dementia. One younger brother with no medical problems    Vitals as above  Gen: no distress    Exam otherwise deferred,     Tr was seen today for chronic pain.    Diagnoses and all orders for this visit:    Rectal carcinoma, issues regarding chemotherapy, radiation therapy and psychological stressors discussed    Other chronic pain, prescription pain management done with a discussion of tolerance, habituation and reductions in doses so forth    Anorgasmia of male, discussed as above    Other orders  -     Cancel: Pneumococcal Conjugate Vaccine (13 Valent) (IM)  -     Cancel: Pneumococcal Polysaccharide Vaccine (23 Valent) (SQ/IM)  -     hydrocodone-acetaminophen 5-325mg (NORCO) 5-325 mg per tablet; Take 1 tablet by mouth every 6 (six) hours as needed for Pain.  -     hydrocodone-acetaminophen 5-325mg (NORCO) 5-325 mg per tablet; Take 1 tablet by mouth every 6 (six) hours as needed for Pain.  -     hydrocodone-acetaminophen 5-325mg (NORCO) 5-325 mg per tablet; Take 1 tablet by mouth every 6 (six) hours as needed for Pain.

## 2017-05-01 NOTE — TELEPHONE ENCOUNTER
called to confirm need of refill for xeloda, patient states that he has half of a bottle left because his dose was reduced and states this is his off week. patient states that he is not sure if his MD will change his dose again and request we f/u with him next tuesday, that he will know something then    Katie Hondroulis [facility]- Refill Technician  Phone: [phone]

## 2017-05-05 ENCOUNTER — LAB VISIT (OUTPATIENT)
Dept: LAB | Facility: HOSPITAL | Age: 62
End: 2017-05-05
Attending: INTERNAL MEDICINE
Payer: COMMERCIAL

## 2017-05-05 DIAGNOSIS — R11.0 NAUSEA: ICD-10-CM

## 2017-05-05 LAB
ALBUMIN SERPL BCP-MCNC: 3.6 G/DL
ALP SERPL-CCNC: 66 U/L
ALT SERPL W/O P-5'-P-CCNC: 15 U/L
ANION GAP SERPL CALC-SCNC: 7 MMOL/L
AST SERPL-CCNC: 18 U/L
BASOPHILS # BLD AUTO: 0.02 K/UL
BASOPHILS NFR BLD: 0.3 %
BILIRUB SERPL-MCNC: 0.4 MG/DL
BUN SERPL-MCNC: 7 MG/DL
CALCIUM SERPL-MCNC: 9.2 MG/DL
CEA SERPL-MCNC: 8.2 NG/ML
CHLORIDE SERPL-SCNC: 106 MMOL/L
CO2 SERPL-SCNC: 27 MMOL/L
CREAT SERPL-MCNC: 1 MG/DL
DIFFERENTIAL METHOD: ABNORMAL
EOSINOPHIL # BLD AUTO: 0.2 K/UL
EOSINOPHIL NFR BLD: 2.5 %
ERYTHROCYTE [DISTWIDTH] IN BLOOD BY AUTOMATED COUNT: 19.3 %
EST. GFR  (AFRICAN AMERICAN): >60 ML/MIN/1.73 M^2
EST. GFR  (NON AFRICAN AMERICAN): >60 ML/MIN/1.73 M^2
GLUCOSE SERPL-MCNC: 102 MG/DL
HCT VFR BLD AUTO: 44.5 %
HGB BLD-MCNC: 15.3 G/DL
LYMPHOCYTES # BLD AUTO: 1.4 K/UL
LYMPHOCYTES NFR BLD: 23.4 %
MCH RBC QN AUTO: 34.5 PG
MCHC RBC AUTO-ENTMCNC: 34.4 %
MCV RBC AUTO: 101 FL
MONOCYTES # BLD AUTO: 0.8 K/UL
MONOCYTES NFR BLD: 13 %
NEUTROPHILS # BLD AUTO: 3.6 K/UL
NEUTROPHILS NFR BLD: 60.8 %
PLATELET # BLD AUTO: 235 K/UL
PMV BLD AUTO: 8.8 FL
POTASSIUM SERPL-SCNC: 4.5 MMOL/L
PROT SERPL-MCNC: 6.9 G/DL
RBC # BLD AUTO: 4.43 M/UL
SODIUM SERPL-SCNC: 140 MMOL/L
WBC # BLD AUTO: 5.91 K/UL

## 2017-05-05 PROCEDURE — 36415 COLL VENOUS BLD VENIPUNCTURE: CPT

## 2017-05-05 PROCEDURE — 80053 COMPREHEN METABOLIC PANEL: CPT

## 2017-05-05 PROCEDURE — 82378 CARCINOEMBRYONIC ANTIGEN: CPT

## 2017-05-05 PROCEDURE — 85025 COMPLETE CBC W/AUTO DIFF WBC: CPT

## 2017-05-09 ENCOUNTER — TELEPHONE (OUTPATIENT)
Dept: HEMATOLOGY/ONCOLOGY | Facility: CLINIC | Age: 62
End: 2017-05-09

## 2017-05-09 ENCOUNTER — OFFICE VISIT (OUTPATIENT)
Dept: HEMATOLOGY/ONCOLOGY | Facility: CLINIC | Age: 62
End: 2017-05-09
Payer: COMMERCIAL

## 2017-05-09 VITALS
SYSTOLIC BLOOD PRESSURE: 130 MMHG | HEIGHT: 75 IN | WEIGHT: 192.69 LBS | DIASTOLIC BLOOD PRESSURE: 82 MMHG | BODY MASS INDEX: 23.96 KG/M2 | HEART RATE: 102 BPM | TEMPERATURE: 99 F | OXYGEN SATURATION: 97 %

## 2017-05-09 DIAGNOSIS — C20 RECTAL CARCINOMA: Primary | ICD-10-CM

## 2017-05-09 DIAGNOSIS — C20 MALIGNANT NEOPLASM OF RECTUM: Primary | ICD-10-CM

## 2017-05-09 DIAGNOSIS — F17.200 TOBACCO DEPENDENCE SYNDROME: ICD-10-CM

## 2017-05-09 DIAGNOSIS — Z51.11 ENCOUNTER FOR CHEMOTHERAPY MANAGEMENT: ICD-10-CM

## 2017-05-09 DIAGNOSIS — C20 RECTAL ADENOCARCINOMA: Primary | ICD-10-CM

## 2017-05-09 DIAGNOSIS — R97.0 ELEVATED CEA: ICD-10-CM

## 2017-05-09 PROCEDURE — 3075F SYST BP GE 130 - 139MM HG: CPT | Mod: S$GLB,,, | Performed by: INTERNAL MEDICINE

## 2017-05-09 PROCEDURE — 99406 BEHAV CHNG SMOKING 3-10 MIN: CPT | Mod: S$GLB,,, | Performed by: INTERNAL MEDICINE

## 2017-05-09 PROCEDURE — 3079F DIAST BP 80-89 MM HG: CPT | Mod: S$GLB,,, | Performed by: INTERNAL MEDICINE

## 2017-05-09 PROCEDURE — 99215 OFFICE O/P EST HI 40 MIN: CPT | Mod: 25,S$GLB,, | Performed by: INTERNAL MEDICINE

## 2017-05-09 PROCEDURE — 99999 PR PBB SHADOW E&M-EST. PATIENT-LVL III: CPT | Mod: PBBFAC,,, | Performed by: INTERNAL MEDICINE

## 2017-05-09 RX ORDER — CAPECITABINE 500 MG/1
1500 TABLET, FILM COATED ORAL 2 TIMES DAILY
Qty: 180 TABLET | Refills: 0 | Status: SHIPPED | OUTPATIENT
Start: 2017-05-09 | End: 2017-08-18

## 2017-05-09 NOTE — PROGRESS NOTES
Subjective:       Patient ID: Tr Chen is a 62 y.o. male.    Chief Complaint: Follow-up  Diagnosis:  Rectal CA    HPI   62-year-old male here for f/u for  rectosigmoid adenocarcinoma, status post LAR,12/27/2016.pT3 pN1c with 0 of 24 lymph nodes involved with tumor deposits present.   CT imaging studies negative for evidence of distant disease.  1/27/2017 PET/CT imaging  reveals   hepatic metastatic lesion . He subsequently underwent CT guided liver bx 2/3/2017 - NEG for malignancy . He is undergoing adjuvant therapy with XELOX    Today, he is dong well.  He is s/p XELOX cycle 4  Completed 4/17/2017  He reports intermittent tingling hands -stable  No signs/sx's HFS (hand foot syndrome)   No SOB/CP/N/V   No fevers      Prev Hx: Pt  with past medical history of colonic polyps, hypertension, tobacco abuse, seen today in consultation for recently diagnosed rectosigmoid adenocarcinoma.  The patient reports intermittent rectal bleeding for the past 3 to 4 months.  He subsequently underwent a colonoscopy on 11/21/2016, which revealed polyps noted in the descending colon, sigmoid colon, and rectum and also noted for a malignant-appearing partially obstructing tumor in the distal sigmoid colon with ulceration. Pathology of the rectosigmoid mass revealed a tubulovillous adenoma and polyps benign..The patient reports he underwent a colonoscopy during his 40s for rectal bleeding.  He was diagnosed with a large bleeding polyp, for which he underwent removal.  He did not undergo surveillance colonoscopies as advised.  No family history of colon cancer.  CT of the abdomen and pelvis on 11/22/2016 revealed thickening of the rectal wall, findings, rectosigmoid neoplasm at the rectosigmoid junction and numerous hepatic lesions compatible with hepatic cysts. He  subsequently underwent a rectal sigmoidectomy on 12/27/2016 .  Pathology revealed an adenocarcinoma, low grade, clear margins. The patient underwent removal of 24 lymph  "nodes with 0 lymph nodes involved.  Evidence of tumor deposits noted with pathologic staging pT3, pN1c.      PAST MEDICAL HISTORY:  Erectile dysfunction, colonic polyps, hypertension, tobacco abuse disorder, chronic pain.    PAST SURGICAL HISTORY:  As above.    SOCIAL HISTORY:  He is a former smoker.  He has a greater than 40-pack-year.  He drinks socially.  He is employed as an  for [employer]  .    FAMILY HISTORY:  Mom diagnosed with breast cancer at age 61.  Dad  at age 87 from Alzheimer's.  He has one sibling, brother in overall good health.    Therapy: Xeloda  1500mg bid Xeloda 2 wks on 1 wk off ( BSA 2.1)   Oxaliplatin 130mg/m2 Day 1 q 21 d       Review of Systems   Constitutional: Negative for appetite change, fatigue, fever and unexpected weight change.   HENT: Negative for mouth sores and nosebleeds.    Eyes: Negative for visual disturbance.   Respiratory: Negative for cough and shortness of breath.    Cardiovascular: Negative for chest pain and leg swelling.   Gastrointestinal: Negative for abdominal pain, blood in stool, constipation, diarrhea, nausea and vomiting.   Genitourinary: Negative for frequency and hematuria.   Musculoskeletal: Negative for arthralgias and back pain.   Skin: Negative for rash.   Neurological: Negative for dizziness, weakness, light-headedness, numbness and headaches.   Hematological: Negative for adenopathy.       Objective:       Vitals:    17 1410   BP: 130/82   BP Location: Left arm   Patient Position: Sitting   BP Method: Manual   Pulse: 102   Temp: 99.2 °F (37.3 °C)   TempSrc: Oral   SpO2: 97%   Weight: 87.4 kg (192 lb 10.9 oz)   Height: 6' 3" (1.905 m)       Physical Exam   Constitutional: He is oriented to person, place, and time. He appears well-developed and well-nourished.   HENT:   Head: Normocephalic.   Mouth/Throat: Oropharynx is clear and moist. No oropharyngeal exudate.   Eyes: Conjunctivae are normal. No scleral icterus. "   Neck: Normal range of motion. Neck supple. No thyromegaly present.   Cardiovascular: Normal rate, regular rhythm and normal heart sounds.    No murmur heard.  Pulmonary/Chest: Effort normal and breath sounds normal. He has no wheezes. He has no rales.   Abdominal: Soft. Bowel sounds are normal. He exhibits no distension and no mass. There is no hepatosplenomegaly. There is no tenderness. There is no rebound and no guarding.   Musculoskeletal: Normal range of motion. He exhibits no edema.   Lymphadenopathy:     He has no cervical adenopathy.     He has no axillary adenopathy.        Right: No supraclavicular adenopathy present.        Left: No supraclavicular adenopathy present.   Neurological: He is alert and oriented to person, place, and time. No cranial nerve deficit.   Skin: No rash noted. No erythema.   Psychiatric: He has a normal mood and affect.       1/27/2017 PET/CT reveals   hepatic metastatic lesion      There is uptake seen near the surgical site in the sigmoid colon. At this juncture this may represent post surgical inflammation residual disease cannot be excluded.      CT a/p 2/3/2017  1.  Thickening of the wall of the rectosigmoid junction either from a neoplasm or from inflammatory processes.  2.  Numerous low attenuation hepatic cysts.  3.  Fibrofatty infiltration of the liver.  4.  Spondylosis in the lumbar spine as discussed above.    LIVER BIOPSIES, CORE BIOPSIES-BENIGN HEPATIC PARENCHYMA. THERE IS MILD MACRO VACUOLAR FATTY CHANGE AND MILD NONSPECIFIC FOCAL CHRONIC TRIADITIS. THERE IS NO EVIDENCE OF TUMOR OR GRANULOMATA PRESENT. A FOCAL AREA OF NONSPECIFIC FIBROSIS SUGGESTIVE OF SCAR TISSUE IS NOTED. THERE IS NO EVIDENCE OF MALIGNANCY.      Results for BRANDY OLIVA (MRN [id number]) as of 5/9/2017 14:40   Ref. Range 3/24/2017 10:24 4/11/2017 11:45 5/5/2017 09:22   CEA Latest Ref Range: 0.0 - 5.0 ng/mL 8.3 (H) 8.9 (H) 8.2 (H)     Assessment:       1. Rectal carcinoma    2. Encounter for  chemotherapy management    3. Elevated CEA    4. Tobacco dependence syndrome        Plan:   Pt clinically stable  62-year-old with recently diagnosed rectosigmoid adenocarcinoma, status post LAR,12/27/2016.pT3 pN1c with 0 of 24 lymph nodes involved with tumor deposits present.    CT imaging studies negative for evidence of distant disease.     PET/CT imaging studies - hepatic lesion  S/p liver bx - benign   S/p cycle 4 of XELOX completed 4/17/2017    Plan surveillance imaging studies prior to initiation of chemo/RT    Tentatively plan to  begin chemo/RT on 5/22/2017   He will take 1500mg bid Xeloda ( 750mg/m2 ) M-F with RT     Ambulatory referral to smoking cessation   Weekly cbc,cmp     F/u  1mo CBC, CMP prior to f/u     CC: Darrick Jay M.D.      Tobacco Use/Cessation:  I assessed Tr Chen and discussed smoking cessation with him for 3-10 minutes. He  History   Smoking Status    Current Every Day Smoker    Packs/day: 0.30    Years: 30.00   Smokeless Tobacco    Never Used

## 2017-05-09 NOTE — MR AVS SNAPSHOT
[facility]  [address] [facility] [address] [address]  Phone: [phone]                  Tr Chen   2017 2:30 PM   Office Visit    Description:  Male : 1955   Provider:  Latrice Jones MD   [facility]           Reason for Visit     Follow-up                To Do List           Future Appointments        Provider Department Dept Phone    2017 8:25 AM LAB, [facility] [phone]    2017 9:00 AM Latrice Jones MD [facility] [phone]      Goals (5 Years of Data)     None      Follow-Up and Disposition     Return in about 1 month (around 2017).      [facility]     [facility] Care Line -  Assistance  Unless otherwise directed by your provider, please contact [facility], our nurse care line that is available for  assistance.     Registered nurses in the [facility] provide: appointment scheduling, clinical advisement, health education, and other advisory services.  Call: [phone] (toll free)               Medications           Message regarding Medications     Verify the changes and/or additions to your medication regime listed below are the same as discussed with your clinician today.  If any of these changes or additions are incorrect, please notify your healthcare provider.             Verify that the below list of medications is an accurate representation of the medications you are currently taking.  If none reported, the list may be blank. If incorrect, please contact your healthcare provider. Carry this list with you in case of emergency.           Current Medications     hydrocodone-acetaminophen 5-325mg (NORCO) 5-325 mg per tablet Take 1 tablet by mouth every 6 (six) hours as needed for Pain.    hydrocodone-acetaminophen 5-325mg (NORCO) 5-325 mg per tablet Starting on 2017. Take 1 tablet by mouth every 6 (six)  "hours as needed for Pain.    hydrocodone-acetaminophen 5-325mg (NORCO) 5-325 mg per tablet Starting on Jul 01, 2017. Take 1 tablet by mouth every 6 (six) hours as needed for Pain.    sildenafil (VIAGRA) 100 MG tablet Take 1 tablet (100 mg total) by mouth daily as needed for Erectile Dysfunction. half -1 Tablet Oral .  Take 30 min before anticipated need.    capecitabine (XELODA) 150 MG tablet Take 1,250 mg/m2 by mouth. Take 3 tablets in the am, 3 tablets in the pm    capecitabine (XELODA) 500 MG Tab TAKE 3 TABLETS (1500MG TOTAL) BY MOUTH TWO TIMES A DAY ON DAYS 1-14 OF 21 DAY CYCLE    ondansetron (ZOFRAN) 8 MG tablet Take 1 tablet (8 mg total) by mouth every 12 (twelve) hours as needed for Nausea.           Clinical Reference Information           Your Vitals Were     BP Pulse Temp Height Weight SpO2    130/82 (BP Location: Left arm, Patient Position: Sitting, BP Method: Manual) 102 99.2 °F (37.3 °C) (Oral) 6' 3" (1.905 m) 87.4 kg (192 lb 10.9 oz) 97%    BMI                24.08 kg/m2          Blood Pressure          Most Recent Value    BP  130/82      Allergies as of 5/9/2017     No Known Allergies      Immunizations Administered on Date of Encounter - 5/9/2017     None      Smoking Cessation     If you would like to quit smoking:   You may be eligible for free services if you are a [place] resident and started smoking cigarettes before September 1, 1988.  Call the Smoking Cessation Trust ([facility]) toll free at [phone] or [phone].   Call 1-800-QUIT-NOW if you do not meet the above criteria.   Contact us via email: [email or web address]   View our website for more information: [email or web address]        Language Assistance Services     ATTENTION: Language assistance services are available, free of charge. Please call [phone].      ATENCIÓN: Si habla español, tiene a montano disposición servicios gratuitos de asistencia lingüística. Llame al [phone].     CHÚ Ý: N?u b?n nói Ti?ng " Vi?t, có các d?ch v? h? tr? ngôn ng? mi?n phí dành cho b?n. G?i s? [phone].         [facility] complies with applicable Federal civil rights laws and does not discriminate on the basis of race, color, national origin, age, disability, or sex.

## 2017-05-16 ENCOUNTER — TELEPHONE (OUTPATIENT)
Dept: PHARMACY | Facility: CLINIC | Age: 62
End: 2017-05-16

## 2017-05-17 ENCOUNTER — HOSPITAL ENCOUNTER (OUTPATIENT)
Dept: RADIOLOGY | Facility: HOSPITAL | Age: 62
Discharge: HOME | End: 2017-05-17
Attending: INTERNAL MEDICINE
Payer: COMMERCIAL

## 2017-05-17 DIAGNOSIS — C20 RECTAL CARCINOMA: ICD-10-CM

## 2017-05-17 PROCEDURE — 25500020 PHARM REV CODE 255: Performed by: INTERNAL MEDICINE

## 2017-05-17 PROCEDURE — 74177 CT ABD & PELVIS W/CONTRAST: CPT | Mod: 26,,, | Performed by: RADIOLOGY

## 2017-05-17 PROCEDURE — 74177 CT ABD & PELVIS W/CONTRAST: CPT | Mod: TC

## 2017-05-17 RX ADMIN — IOHEXOL 15 ML: 300 INJECTION, SOLUTION INTRAVENOUS at 08:05

## 2017-05-17 RX ADMIN — IOHEXOL 100 ML: 350 INJECTION, SOLUTION INTRAVENOUS at 08:05

## 2017-05-19 ENCOUNTER — OFFICE VISIT (OUTPATIENT)
Dept: FAMILY MEDICINE | Facility: CLINIC | Age: 62
End: 2017-05-19
Payer: COMMERCIAL

## 2017-05-19 VITALS
WEIGHT: 192.25 LBS | DIASTOLIC BLOOD PRESSURE: 80 MMHG | BODY MASS INDEX: 23.9 KG/M2 | HEIGHT: 75 IN | SYSTOLIC BLOOD PRESSURE: 130 MMHG | TEMPERATURE: 99 F | OXYGEN SATURATION: 97 % | HEART RATE: 99 BPM

## 2017-05-19 DIAGNOSIS — F52.32 ANORGASMIA OF MALE: ICD-10-CM

## 2017-05-19 DIAGNOSIS — C20 RECTAL CARCINOMA: ICD-10-CM

## 2017-05-19 DIAGNOSIS — N52.9 ERECTILE DYSFUNCTION, UNSPECIFIED ERECTILE DYSFUNCTION TYPE: Primary | ICD-10-CM

## 2017-05-19 PROCEDURE — 3079F DIAST BP 80-89 MM HG: CPT | Mod: S$GLB,,, | Performed by: INTERNAL MEDICINE

## 2017-05-19 PROCEDURE — 99999 PR PBB SHADOW E&M-EST. PATIENT-LVL III: CPT | Mod: PBBFAC,,, | Performed by: INTERNAL MEDICINE

## 2017-05-19 PROCEDURE — 99214 OFFICE O/P EST MOD 30 MIN: CPT | Mod: S$GLB,,, | Performed by: INTERNAL MEDICINE

## 2017-05-19 PROCEDURE — 1160F RVW MEDS BY RX/DR IN RCRD: CPT | Mod: S$GLB,,, | Performed by: INTERNAL MEDICINE

## 2017-05-19 PROCEDURE — 3075F SYST BP GE 130 - 139MM HG: CPT | Mod: S$GLB,,, | Performed by: INTERNAL MEDICINE

## 2017-05-19 RX ORDER — TADALAFIL 20 MG/1
20 TABLET ORAL
Qty: 3 TABLET | Refills: 11 | Status: SHIPPED | OUTPATIENT
Start: 2017-05-19 | End: 2017-11-01 | Stop reason: SDUPTHER

## 2017-05-19 NOTE — PROGRESS NOTES
Chief complaint refills and follow-up from colon cancer    62-year-old white male who finally did have his needed colonoscopy whereupon colon cancer was found.  He recently had a low anterior resection of the surgery notes were reviewed and appears he was doing well.  Since the surgery however he has only been able to have a semierect erection which also led to inability to orgasm.  The last 2-3 times he is attempting to have intercourse however he was unable to get an erection at all and he did use Viagra at least 2 occasions at 100 mg.  No improvement at all.  He is under a lot of stress admittedly going to start chemotherapy in the near future.  Erectile dysfunction also a source of stress as it is still very important to him to be able to perform sexually.  He never had problems with erectile dysfunction even on the same dosing of hydrocodone previously and did not have any sexual issues prior to the surgery.  We reviewed together the surgery reported does not appear there was any injury and the surgery although in that region did not appear to directly involve the area of the prostate and so forth.  We did discuss that could've been nerve injury and so forth which will recover over time.  He has already discussed with the surgeon who did not feel it was related to the surgery.  He has already discussed with radiation oncology that apparently they will not be radiating in an area that should damage nerves so forth related to erectile function.  We discussed trying Cialis and a prescription and coupon were given.    Review of systems: No fevers or chills, no other urinary complaints    PAST MEDICAL HISTORY:   1. ED.   2. HTN.   3. Smoker.   4. Allergic rhinitis with exposure to mold   5. Frequent bronchitis   6. Lumbar strain due to work on [employer]   7. Large colon polyp removed before age 50 - over 10-15 -yrs  8.  Chronic pain referable to ankle arthritis   9.  Colon cancer 2016 with low anterior  resection    Social history: Still works both one pack per day, no alcohol previously  with kids, works on the [employer]     Family history: Mom  of breast cancer, father  with dementia. One younger brother with no medical problems    Vitals as above  Gen: no distress    Exam otherwise deferred,     Tr was seen today for discuss health.    Diagnoses and all orders for this visit:    Erectile dysfunction, unspecified erectile dysfunction type, recent onset, hopefully it psychogenic related to stress but cannot rule out an organic cause related to the recent surgery and treatment.  We will try Cialis.  Discussed eventually seeing urology for their opinion and other treatment options.  We discussed injections.  Counseled regarding these issuesTotal time over 25 minutes with over 50% counseling.    Anorgasmia of male, likely secondary to the erectile dysfunction    Rectal carcinoma, to have chemotherapy and radiation    Other orders  -     tadalafil (CIALIS) 20 MG Tab; Take 1 tablet (20 mg total) by mouth every 36 (thirty-six) hours.

## 2017-05-31 ENCOUNTER — DOCUMENTATION ONLY (OUTPATIENT)
Dept: HEMATOLOGY/ONCOLOGY | Facility: CLINIC | Age: 62
End: 2017-05-31

## 2017-05-31 NOTE — PROGRESS NOTES
Pt called to report that he is only able to get quantity of 10 Zofran at a time.  Called [facility] & obtained quantity override for 60 tabs.  Pt informed.

## 2017-06-02 ENCOUNTER — TELEPHONE (OUTPATIENT)
Dept: PHARMACY | Facility: CLINIC | Age: 62
End: 2017-06-02

## 2017-06-08 ENCOUNTER — LAB VISIT (OUTPATIENT)
Dept: LAB | Facility: HOSPITAL | Age: 62
End: 2017-06-08
Attending: INTERNAL MEDICINE
Payer: COMMERCIAL

## 2017-06-08 DIAGNOSIS — C20 MALIGNANT NEOPLASM OF RECTUM: ICD-10-CM

## 2017-06-08 LAB
ALBUMIN SERPL BCP-MCNC: 4 G/DL
ALP SERPL-CCNC: 70 U/L
ALT SERPL W/O P-5'-P-CCNC: 11 U/L
ANION GAP SERPL CALC-SCNC: 9 MMOL/L
AST SERPL-CCNC: 17 U/L
BASOPHILS # BLD AUTO: 0.01 K/UL
BASOPHILS NFR BLD: 0.2 %
BILIRUB SERPL-MCNC: 0.7 MG/DL
BUN SERPL-MCNC: 6 MG/DL
CALCIUM SERPL-MCNC: 9.4 MG/DL
CHLORIDE SERPL-SCNC: 104 MMOL/L
CO2 SERPL-SCNC: 25 MMOL/L
CREAT SERPL-MCNC: 0.9 MG/DL
DIFFERENTIAL METHOD: ABNORMAL
EOSINOPHIL # BLD AUTO: 0.2 K/UL
EOSINOPHIL NFR BLD: 2.9 %
ERYTHROCYTE [DISTWIDTH] IN BLOOD BY AUTOMATED COUNT: 14.5 %
EST. GFR  (AFRICAN AMERICAN): >60 ML/MIN/1.73 M^2
EST. GFR  (NON AFRICAN AMERICAN): >60 ML/MIN/1.73 M^2
GLUCOSE SERPL-MCNC: 90 MG/DL
HCT VFR BLD AUTO: 39.9 %
HGB BLD-MCNC: 14.1 G/DL
LYMPHOCYTES # BLD AUTO: 0.8 K/UL
LYMPHOCYTES NFR BLD: 14.2 %
MCH RBC QN AUTO: 36.2 PG
MCHC RBC AUTO-ENTMCNC: 35.3 %
MCV RBC AUTO: 103 FL
MONOCYTES # BLD AUTO: 0.8 K/UL
MONOCYTES NFR BLD: 14 %
NEUTROPHILS # BLD AUTO: 3.7 K/UL
NEUTROPHILS NFR BLD: 68 %
PLATELET # BLD AUTO: 217 K/UL
PMV BLD AUTO: 8.1 FL
POTASSIUM SERPL-SCNC: 4.4 MMOL/L
PROT SERPL-MCNC: 7.2 G/DL
RBC # BLD AUTO: 3.89 M/UL
SODIUM SERPL-SCNC: 138 MMOL/L
WBC # BLD AUTO: 5.5 K/UL

## 2017-06-08 PROCEDURE — 80053 COMPREHEN METABOLIC PANEL: CPT

## 2017-06-08 PROCEDURE — 36415 COLL VENOUS BLD VENIPUNCTURE: CPT

## 2017-06-08 PROCEDURE — 82378 CARCINOEMBRYONIC ANTIGEN: CPT

## 2017-06-08 PROCEDURE — 85025 COMPLETE CBC W/AUTO DIFF WBC: CPT

## 2017-06-09 LAB — CEA SERPL-MCNC: 6.8 NG/ML

## 2017-06-12 ENCOUNTER — OFFICE VISIT (OUTPATIENT)
Dept: HEMATOLOGY/ONCOLOGY | Facility: CLINIC | Age: 62
End: 2017-06-12
Payer: COMMERCIAL

## 2017-06-12 VITALS
OXYGEN SATURATION: 98 % | WEIGHT: 192.25 LBS | SYSTOLIC BLOOD PRESSURE: 125 MMHG | BODY MASS INDEX: 23.9 KG/M2 | DIASTOLIC BLOOD PRESSURE: 79 MMHG | TEMPERATURE: 99 F | HEIGHT: 75 IN

## 2017-06-12 DIAGNOSIS — C20 RECTAL CARCINOMA: Primary | ICD-10-CM

## 2017-06-12 DIAGNOSIS — R19.7 DIARRHEA, UNSPECIFIED TYPE: ICD-10-CM

## 2017-06-12 DIAGNOSIS — Z51.11 ENCOUNTER FOR CHEMOTHERAPY MANAGEMENT: ICD-10-CM

## 2017-06-12 PROCEDURE — 99999 PR PBB SHADOW E&M-EST. PATIENT-LVL III: CPT | Mod: PBBFAC,,, | Performed by: INTERNAL MEDICINE

## 2017-06-12 PROCEDURE — 99214 OFFICE O/P EST MOD 30 MIN: CPT | Mod: S$GLB,,, | Performed by: INTERNAL MEDICINE

## 2017-06-12 RX ORDER — PRAMOXINE HYDROCHLORIDE HYDROCORTISONE ACETATE 100; 100 MG/10G; MG/10G
AEROSOL, FOAM TOPICAL
COMMUNITY
Start: 2017-06-06 | End: 2017-08-18 | Stop reason: SDUPTHER

## 2017-06-12 RX ORDER — DIPHENOXYLATE HYDROCHLORIDE AND ATROPINE SULFATE 2.5; .025 MG/1; MG/1
TABLET ORAL
COMMUNITY
Start: 2017-06-06 | End: 2017-08-18

## 2017-06-12 NOTE — PROGRESS NOTES
Subjective:       Patient ID: Tr Chen is a 62 y.o. male.    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 CT imaging studies negative for evidence of distant disease.  1/27/2017 PET/CT imaging  reveals   hepatic metastatic lesion . He subsequently underwent CT guided liver bx 2/3/2017 - NEG for malignancy . He is undergoing adjuvant therapy with XELOX    He is s/p XELOX cycle 4  Completed 4/17/2017  He is currently undergoing chemo/RT initiated  5/22/2017       CT a/p w/contrast 5/17/2017- no disease recurrence, numerous cystic lesions    Today, he reports daily loose stools past 2 wks  No SOB/CP  No fevers  He is taking Xeloda 1500mg po bid  He is taking antidiarrheal meds w/minor relief  Pt having up to 10 loose stools daily  No N/V/abd pain      Prev Hx: Pt  with past medical history of colonic polyps, hypertension, tobacco abuse, seen today in consultation for recently diagnosed rectosigmoid adenocarcinoma.  The patient reports intermittent rectal bleeding for the past 3 to 4 months.  He subsequently underwent a colonoscopy on 11/21/2016, which revealed polyps noted in the descending colon, sigmoid colon, and rectum and also noted for a malignant-appearing partially obstructing tumor in the distal sigmoid colon with ulceration. Pathology of the rectosigmoid mass revealed a tubulovillous adenoma and polyps benign..The patient reports he underwent a colonoscopy during his 40s for rectal bleeding.  He was diagnosed with a large bleeding polyp, for which he underwent removal.  He did not undergo surveillance colonoscopies as advised.  No family history of colon cancer.  CT of the abdomen and pelvis on 11/22/2016 revealed thickening of the rectal wall, findings, rectosigmoid neoplasm at  "the rectosigmoid junction and numerous hepatic lesions compatible with hepatic cysts. He  subsequently underwent a rectal sigmoidectomy on 2016 .  Pathology revealed an adenocarcinoma, low grade, clear margins. The patient underwent removal of 24 lymph nodes with 0 lymph nodes involved.  Evidence of tumor deposits noted with pathologic staging pT3, pN1c.      PAST MEDICAL HISTORY:  Erectile dysfunction, colonic polyps, hypertension, tobacco abuse disorder, chronic pain.    PAST SURGICAL HISTORY:  As above.    SOCIAL HISTORY:  He is a former smoker.  He has a greater than 40-pack-year.  He drinks socially.  He is employed as an  for [employer]  .    FAMILY HISTORY:  Mom diagnosed with breast cancer at age 61.  Dad  at age 87 from Alzheimer's.  He has one sibling, brother in overall good health.    Therapy: s/p Xeloda  1500mg bid Xeloda 2 wks on 1 wk off / Oxaliplatin 130mg/m2 Day 1 q 21 d  X 4       Review of Systems   Constitutional: Negative for appetite change, fatigue, fever and unexpected weight change.   HENT: Negative for mouth sores and nosebleeds.    Eyes: Negative for visual disturbance.   Respiratory: Negative for cough and shortness of breath.    Cardiovascular: Negative for chest pain and leg swelling.   Gastrointestinal: Positive for diarrhea. Negative for abdominal pain, blood in stool, constipation, nausea and vomiting.   Genitourinary: Negative for frequency and hematuria.   Musculoskeletal: Negative for arthralgias and back pain.   Skin: Negative for rash.   Neurological: Negative for dizziness, weakness, light-headedness, numbness and headaches.   Hematological: Negative for adenopathy.       Objective:       Vitals:    17 1010   BP: 125/79   Temp: 98.5 °F (36.9 °C)   SpO2: 98%   Weight: 87.2 kg (192 lb 3.9 oz)   Height: 6' 3" (1.905 m)       Physical Exam   Constitutional: He is oriented to person, place, and time. He appears well-developed and " well-nourished.   HENT:   Head: Normocephalic.   Mouth/Throat: Oropharynx is clear and moist. No oropharyngeal exudate.   Eyes: Conjunctivae are normal. No scleral icterus.   Neck: Normal range of motion. Neck supple. No thyromegaly present.   Cardiovascular: Normal rate, regular rhythm and normal heart sounds.    No murmur heard.  Pulmonary/Chest: Effort normal and breath sounds normal. He has no wheezes. He has no rales.   Abdominal: Soft. Bowel sounds are normal. He exhibits no distension and no mass. There is no hepatosplenomegaly. There is no tenderness. There is no rebound and no guarding.   Musculoskeletal: Normal range of motion. He exhibits no edema.   Lymphadenopathy:     He has no cervical adenopathy.     He has no axillary adenopathy.        Right: No supraclavicular adenopathy present.        Left: No supraclavicular adenopathy present.   Neurological: He is alert and oriented to person, place, and time. No cranial nerve deficit.   Skin: No rash noted. No erythema.   Psychiatric: He has a normal mood and affect.       1/27/2017 PET/CT reveals   hepatic metastatic lesion      LIVER BIOPSIES, CORE BIOPSIES-BENIGN HEPATIC PARENCHYMA. THERE IS MILD MACRO VACUOLAR FATTY CHANGE AND MILD NONSPECIFIC FOCAL CHRONIC TRIADITIS. THERE IS NO EVIDENCE OF TUMOR OR GRANULOMATA PRESENT. A FOCAL AREA OF NONSPECIFIC FIBROSIS SUGGESTIVE OF SCAR TISSUE IS NOTED. THERE IS NO EVIDENCE OF MALIGNANCY.      Results for BRANDY OLIVA (MRN [id number]) as of 5/9/2017 14:40   Ref. Range 3/24/2017 10:24 4/11/2017 11:45 5/5/2017 09:22   CEA Latest Ref Range: 0.0 - 5.0 ng/mL 8.3 (H) 8.9 (H) 8.2 (H)       CT a/p w/contrast 5/17/2017  Interval sigmoidectomy.    Numerous cystic liver lesions, unchanged from the 11/22/16 exam.    Assessment:       1. Rectal carcinoma    2. Encounter for chemotherapy management    3. Diarrhea, unspecified type        Plan:   1-3Pt clinically stable  62-year-old with recently diagnosed rectosigmoid  adenocarcinoma, status post LAR,12/27/2016.pT3 pN1c with 0 of 24 lymph nodes involved with tumor deposits present.    CT imaging - negative for evidence of distant disease.     PET/CT imaging studies - hepatic lesion  S/p liver bx - benign   S/p cycle 4 of adjuvant XELOX completed 4/17/2017  Surveillance imaging studies prior to initiation of chemo/RT- no disease recurrence  chemo/RT began on 5/22/2017   HOLD XELODA due to ASE    Pt will be contacted end of week to follow-up on GI sx's  Weekly cbc,cmp     F/u  3wks CBC, CMP prior to f/u     CC: Darrick Jay M.D.

## 2017-06-14 NOTE — TELEPHONE ENCOUNTER
called to confirm need of refill for capecitabine, Mr. Chen states that his MD took him off for a while due to diarrhea. patient states that he still has some doses on hand and will find out tomorrow if he should schedule his refill. patient request [facility] call him back tomorrow evening to confirm need of refill.    Kaur Escalona  [facility]- Refill Technician  Phone: [phone]

## 2017-06-15 ENCOUNTER — TELEPHONE (OUTPATIENT)
Dept: HEMATOLOGY/ONCOLOGY | Facility: CLINIC | Age: 62
End: 2017-06-15

## 2017-06-15 DIAGNOSIS — T45.1X5A CHEMOTHERAPY-INDUCED DIARRHEA: Primary | ICD-10-CM

## 2017-06-15 DIAGNOSIS — K52.1 CHEMOTHERAPY-INDUCED DIARRHEA: Primary | ICD-10-CM

## 2017-06-15 RX ORDER — CHOLESTYRAMINE 4 G/9G
1 POWDER, FOR SUSPENSION ORAL 2 TIMES DAILY
Qty: 30 PACKET | Refills: 1 | Status: SHIPPED | OUTPATIENT
Start: 2017-06-15 | End: 2017-07-03 | Stop reason: SDUPTHER

## 2017-06-15 NOTE — TELEPHONE ENCOUNTER
Pt called this am to report diarrhea has not improved since stopping the Xeloda on Monday despite Imodium & Lomotil.  He is drinking a gallon of fluid a day to stay hydrated, but he is tired.  Afraid to take too much antidiarrheal medication because he does not want to get constipated.  Called 's office & notified him of what is going on with pt, he sees him today in clinic.  Per  will continue to hold the Xeloda & check back with pt on Monday 6-19-17. Will try Questran for diarrhea.

## 2017-06-15 NOTE — TELEPHONE ENCOUNTER
----- Message from Latrice Jones MD sent at 6/12/2017 10:51 AM CDT -----  Pt instructed to hold xeloda until thurs am  Please contact pt thurs am on status of GI sx's

## 2017-06-16 ENCOUNTER — TELEPHONE (OUTPATIENT)
Dept: PHARMACY | Facility: CLINIC | Age: 62
End: 2017-06-16

## 2017-06-19 ENCOUNTER — TELEPHONE (OUTPATIENT)
Dept: HEMATOLOGY/ONCOLOGY | Facility: CLINIC | Age: 62
End: 2017-06-19

## 2017-06-19 NOTE — TELEPHONE ENCOUNTER
Discussed ongoing diarrhea with , pt had slight improvement on Saturday, but it returned on Sunday, continues taking Lomotil & Questran.  Will continue to hold Xeloda & pt will call me Thursday with an update.

## 2017-06-23 ENCOUNTER — TELEPHONE (OUTPATIENT)
Dept: HEMATOLOGY/ONCOLOGY | Facility: CLINIC | Age: 62
End: 2017-06-23

## 2017-06-23 NOTE — TELEPHONE ENCOUNTER
Called pt to follow up on the status of his diarrhea, he reports it is finally improving with the current medication regimen.  He is hesitant to resume Xeloda, has approximately 6 XRT left.  Discussed with , will not resume Xeloda & just let pt complete XRT. He will continue with weekly labs & see  mid July.  Pt aware & agreeable.

## 2017-07-03 DIAGNOSIS — T45.1X5A CHEMOTHERAPY-INDUCED DIARRHEA: ICD-10-CM

## 2017-07-03 DIAGNOSIS — K52.1 CHEMOTHERAPY-INDUCED DIARRHEA: ICD-10-CM

## 2017-07-04 RX ORDER — CHOLESTYRAMINE 4 G/9G
1 POWDER, FOR SUSPENSION ORAL 2 TIMES DAILY
Qty: 30 PACKET | Refills: 1 | Status: ON HOLD | OUTPATIENT
Start: 2017-07-04 | End: 2017-12-27

## 2017-07-06 ENCOUNTER — LAB VISIT (OUTPATIENT)
Dept: LAB | Facility: HOSPITAL | Age: 62
End: 2017-07-06
Attending: INTERNAL MEDICINE
Payer: COMMERCIAL

## 2017-07-06 DIAGNOSIS — Z51.11 ENCOUNTER FOR CHEMOTHERAPY MANAGEMENT: ICD-10-CM

## 2017-07-06 DIAGNOSIS — C20 RECTAL CARCINOMA: ICD-10-CM

## 2017-07-06 LAB
ALBUMIN SERPL BCP-MCNC: 3.7 G/DL
ALP SERPL-CCNC: 77 U/L
ALT SERPL W/O P-5'-P-CCNC: 19 U/L
ANION GAP SERPL CALC-SCNC: 8 MMOL/L
AST SERPL-CCNC: 21 U/L
BASOPHILS # BLD AUTO: 0.01 K/UL
BASOPHILS NFR BLD: 0.2 %
BILIRUB SERPL-MCNC: 0.6 MG/DL
BUN SERPL-MCNC: 7 MG/DL
CALCIUM SERPL-MCNC: 9.1 MG/DL
CHLORIDE SERPL-SCNC: 106 MMOL/L
CO2 SERPL-SCNC: 25 MMOL/L
CREAT SERPL-MCNC: 0.9 MG/DL
DIFFERENTIAL METHOD: ABNORMAL
EOSINOPHIL # BLD AUTO: 0.1 K/UL
EOSINOPHIL NFR BLD: 1.8 %
ERYTHROCYTE [DISTWIDTH] IN BLOOD BY AUTOMATED COUNT: 13.5 %
EST. GFR  (AFRICAN AMERICAN): >60 ML/MIN/1.73 M^2
EST. GFR  (NON AFRICAN AMERICAN): >60 ML/MIN/1.73 M^2
GLUCOSE SERPL-MCNC: 106 MG/DL
HCT VFR BLD AUTO: 44.4 %
HGB BLD-MCNC: 15.3 G/DL
LYMPHOCYTES # BLD AUTO: 0.6 K/UL
LYMPHOCYTES NFR BLD: 10.5 %
MCH RBC QN AUTO: 35.9 PG
MCHC RBC AUTO-ENTMCNC: 34.5 %
MCV RBC AUTO: 104 FL
MONOCYTES # BLD AUTO: 0.8 K/UL
MONOCYTES NFR BLD: 15.3 %
NEUTROPHILS # BLD AUTO: 3.9 K/UL
NEUTROPHILS NFR BLD: 72.2 %
PLATELET # BLD AUTO: 195 K/UL
PMV BLD AUTO: 8.7 FL
POTASSIUM SERPL-SCNC: 3.9 MMOL/L
PROT SERPL-MCNC: 7.5 G/DL
RBC # BLD AUTO: 4.26 M/UL
SODIUM SERPL-SCNC: 139 MMOL/L
WBC # BLD AUTO: 5.44 K/UL

## 2017-07-06 PROCEDURE — 36415 COLL VENOUS BLD VENIPUNCTURE: CPT

## 2017-07-06 PROCEDURE — 85025 COMPLETE CBC W/AUTO DIFF WBC: CPT

## 2017-07-06 PROCEDURE — 80053 COMPREHEN METABOLIC PANEL: CPT

## 2017-07-14 ENCOUNTER — OFFICE VISIT (OUTPATIENT)
Dept: HEMATOLOGY/ONCOLOGY | Facility: CLINIC | Age: 62
End: 2017-07-14
Payer: COMMERCIAL

## 2017-07-14 VITALS
DIASTOLIC BLOOD PRESSURE: 70 MMHG | OXYGEN SATURATION: 97 % | WEIGHT: 188.25 LBS | BODY MASS INDEX: 23.41 KG/M2 | HEART RATE: 84 BPM | SYSTOLIC BLOOD PRESSURE: 114 MMHG | RESPIRATION RATE: 14 BRPM | HEIGHT: 75 IN

## 2017-07-14 DIAGNOSIS — C20 RECTAL CARCINOMA: Primary | ICD-10-CM

## 2017-07-14 DIAGNOSIS — Z09 CHEMOTHERAPY FOLLOW-UP EXAMINATION: ICD-10-CM

## 2017-07-14 PROCEDURE — 99999 PR PBB SHADOW E&M-EST. PATIENT-LVL III: CPT | Mod: PBBFAC,,, | Performed by: INTERNAL MEDICINE

## 2017-07-14 PROCEDURE — 99214 OFFICE O/P EST MOD 30 MIN: CPT | Mod: S$GLB,,, | Performed by: INTERNAL MEDICINE

## 2017-07-14 RX ORDER — ZOLPIDEM TARTRATE 10 MG/1
TABLET ORAL
Refills: 5 | Status: ON HOLD | COMMUNITY
Start: 2017-06-14 | End: 2017-12-27

## 2017-07-14 NOTE — PROGRESS NOTES
Subjective:       Patient ID: Tr Chen is a 62 y.o. male.    Chief Complaint: Rectal Cancer (Patient states doing well. He completed his radiation theraphy last week.  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 CT imaging studies negative for evidence of distant disease.  1/27/2017 PET/CT imaging  reveals   hepatic metastatic lesion . He subsequently underwent CT guided liver bx 2/3/2017 - NEG for malignancy .     status post LAR,12/27/2016.pT3 pN1cM0 Stage IIIB  He is s/p adjuvant XELOX cycle 4  Completed 4/17/2017  Followed by chemo/RT initiated  5/22/2017   He completed XRT July 6, 2017  s/p partial completion of XELODA approx 6/21/2017 ( due to ASE)     CT a/p w/contrast 5/17/2017- no disease recurrence, numerous cystic lesions    Today, he reports loose stools nearly resolved  He reports stools semi-formed  He completed XRT July 6, 2017  He s/p partial completion of XELODA approx 6/21/2017  No SOB/CP  Mild fatigue  No N/V/abd pain  He is tentatively planned to return to work mid-AUGUST      Prev Hx: Pt  with past medical history of colonic polyps, hypertension, tobacco abuse, seen today in consultation for recently diagnosed rectosigmoid adenocarcinoma.  The patient reports intermittent rectal bleeding for the past 3 to 4 months.  He subsequently underwent a colonoscopy on 11/21/2016, which revealed polyps noted in the descending colon, sigmoid colon, and rectum and also noted for a malignant-appearing partially obstructing tumor in the distal sigmoid colon with ulceration. Pathology of the rectosigmoid mass revealed a tubulovillous adenoma and polyps benign..The patient reports he underwent a colonoscopy during his 40s for rectal bleeding.  He was diagnosed with a large bleeding polyp, for which he  underwent removal.  He did not undergo surveillance colonoscopies as advised.  No family history of colon cancer.  CT of the abdomen and pelvis on 2016 revealed thickening of the rectal wall, findings, rectosigmoid neoplasm at the rectosigmoid junction and numerous hepatic lesions compatible with hepatic cysts. He  subsequently underwent a rectal sigmoidectomy on 2016 .  Pathology revealed an adenocarcinoma, low grade, clear margins. The patient underwent removal of 24 lymph nodes with 0 lymph nodes involved.  Evidence of tumor deposits noted with pathologic staging pT3, pN1c.      PAST MEDICAL HISTORY:  Erectile dysfunction, colonic polyps, hypertension, tobacco abuse disorder, chronic pain.    PAST SURGICAL HISTORY:  As above.    SOCIAL HISTORY:  He is a former smoker.  He has a greater than 40-pack-year.  He drinks socially.  He is employed as an  for [employer]  .    FAMILY HISTORY:  Mom diagnosed with breast cancer at age 61.  Dad  at age 87 from Alzheimer's.  He has one sibling, brother in overall good health.    Therapy:  s/p Xeloda  1500mg bid Xeloda 2 wks on 1 wk off / Oxaliplatin 130mg/m2 Day 1 q 21 d  X 4 completed 2017     S/p partial completion of concurrent chemo/RT completed       Review of Systems   Constitutional: Positive for fatigue. Negative for appetite change, fever and unexpected weight change.   HENT: Negative for mouth sores and nosebleeds.    Eyes: Negative for visual disturbance.   Respiratory: Negative for cough and shortness of breath.    Cardiovascular: Negative for chest pain and leg swelling.   Gastrointestinal: Negative for abdominal pain, blood in stool, constipation, diarrhea, nausea and vomiting.   Genitourinary: Negative for frequency and hematuria.   Musculoskeletal: Negative for arthralgias and back pain.   Skin: Negative for rash.   Neurological: Negative for dizziness, weakness, light-headedness and headaches.  "  Hematological: Negative for adenopathy.       Objective:       Vitals:    07/14/17 0828   BP: 114/70   Pulse: 84   Resp: 14   SpO2: 97%   Weight: 85.4 kg (188 lb 4.4 oz)   Height: 6' 3" (1.905 m)       Physical Exam   Constitutional: He is oriented to person, place, and time. He appears well-developed and well-nourished.   HENT:   Head: Normocephalic.   Mouth/Throat: Oropharynx is clear and moist. No oropharyngeal exudate.   Eyes: Conjunctivae are normal. No scleral icterus.   Neck: Normal range of motion. Neck supple. No thyromegaly present.   Cardiovascular: Normal rate, regular rhythm and normal heart sounds.    No murmur heard.  Pulmonary/Chest: Effort normal and breath sounds normal. He has no wheezes. He has no rales.   Abdominal: Soft. Bowel sounds are normal. He exhibits no distension and no mass. There is no hepatosplenomegaly. There is no tenderness. There is no rebound and no guarding.   Musculoskeletal: Normal range of motion. He exhibits no edema.   Lymphadenopathy:     He has no cervical adenopathy.     He has no axillary adenopathy.        Right: No supraclavicular adenopathy present.        Left: No supraclavicular adenopathy present.   Neurological: He is alert and oriented to person, place, and time. No cranial nerve deficit.   Skin: No rash noted. No erythema.   Psychiatric: He has a normal mood and affect.       1/27/2017 PET/CT reveals   hepatic metastatic lesion      LIVER BIOPSIES, CORE BIOPSIES-BENIGN HEPATIC PARENCHYMA. THERE IS MILD MACRO VACUOLAR FATTY CHANGE AND MILD NONSPECIFIC FOCAL CHRONIC TRIADITIS. THERE IS NO EVIDENCE OF TUMOR OR GRANULOMATA PRESENT. A FOCAL AREA OF NONSPECIFIC FIBROSIS SUGGESTIVE OF SCAR TISSUE IS NOTED. THERE IS NO EVIDENCE OF MALIGNANCY.      Results for BRANDY OLIVA (MRN [id number]) as of 5/9/2017 14:40   Ref. Range 3/24/2017 10:24 4/11/2017 11:45 5/5/2017 09:22   CEA Latest Ref Range: 0.0 - 5.0 ng/mL 8.3 (H) 8.9 (H) 8.2 (H)       CT a/p w/contrast " 5/17/2017  Interval sigmoidectomy.    Numerous cystic liver lesions, unchanged from the 11/22/16 exam.    Assessment:       1. Rectal carcinoma    2. Chemotherapy follow-up examination        Plan:   1-2Pt clinically stable  62-year-old with recently diagnosed rectosigmoid adenocarcinoma, status post LAR,12/27/2016.pT3 pN1c with 0 of 24 lymph nodes involved with tumor deposits present.    CT imaging - negative for evidence of distant disease.     PET/CT imaging studies - hepatic lesion  S/p liver bx - benign   status post LAR,12/27/2016.pT3 pN1cM0 Stage IIIB  He is s/p adjuvant XELOX cycle 4  Completed 4/17/2017  Followed by chemo/RT initiated  5/22/2017   He completed XRT July 6, 2017  s/p partial completion of XELODA approx 6/21/2017 ( due to ASE)  Plan imaging studies near future (1-2 mos)     F/u  1 month with  CBC, CMP prior to f/u     CC: Darrick Jay M.D.

## 2017-08-01 ENCOUNTER — OFFICE VISIT (OUTPATIENT)
Dept: FAMILY MEDICINE | Facility: CLINIC | Age: 62
End: 2017-08-01
Payer: COMMERCIAL

## 2017-08-01 VITALS
SYSTOLIC BLOOD PRESSURE: 135 MMHG | WEIGHT: 189.63 LBS | TEMPERATURE: 99 F | DIASTOLIC BLOOD PRESSURE: 80 MMHG | HEART RATE: 90 BPM | BODY MASS INDEX: 23.58 KG/M2 | OXYGEN SATURATION: 98 % | HEIGHT: 75 IN

## 2017-08-01 DIAGNOSIS — N52.9 ERECTILE DYSFUNCTION, UNSPECIFIED ERECTILE DYSFUNCTION TYPE: ICD-10-CM

## 2017-08-01 DIAGNOSIS — C20 RECTAL CARCINOMA: ICD-10-CM

## 2017-08-01 DIAGNOSIS — G89.29 OTHER CHRONIC PAIN: Primary | ICD-10-CM

## 2017-08-01 DIAGNOSIS — I10 ESSENTIAL HYPERTENSION: ICD-10-CM

## 2017-08-01 PROCEDURE — 99214 OFFICE O/P EST MOD 30 MIN: CPT | Mod: S$GLB,,, | Performed by: INTERNAL MEDICINE

## 2017-08-01 PROCEDURE — 99999 PR PBB SHADOW E&M-EST. PATIENT-LVL III: CPT | Mod: PBBFAC,,, | Performed by: INTERNAL MEDICINE

## 2017-08-01 RX ORDER — HYDROCODONE BITARTRATE AND ACETAMINOPHEN 5; 325 MG/1; MG/1
1 TABLET ORAL EVERY 6 HOURS PRN
Qty: 120 TABLET | Refills: 0 | Status: SHIPPED | OUTPATIENT
Start: 2017-09-01 | End: 2017-08-18 | Stop reason: SDUPTHER

## 2017-08-01 RX ORDER — HYDROCODONE BITARTRATE AND ACETAMINOPHEN 5; 325 MG/1; MG/1
1 TABLET ORAL EVERY 6 HOURS PRN
Qty: 120 TABLET | Refills: 0 | Status: SHIPPED | OUTPATIENT
Start: 2017-10-01 | End: 2017-08-18 | Stop reason: SDUPTHER

## 2017-08-01 RX ORDER — HYDROCODONE BITARTRATE AND ACETAMINOPHEN 5; 325 MG/1; MG/1
1 TABLET ORAL EVERY 6 HOURS PRN
Qty: 120 TABLET | Refills: 0 | Status: SHIPPED | OUTPATIENT
Start: 2017-08-01 | End: 2017-11-01 | Stop reason: SDUPTHER

## 2017-08-01 NOTE — PROGRESS NOTES
Chief complaint refills and follow-up from colon cancer    62-year-old white male who finally did have his needed colonoscopy whereupon colon cancer was found.  He recently had a low anterior resection of the surgery notes were reviewed and appears he was doing well.  Since the surgery however he has only been able to have a semierect erection which also led to inability to orgasm.  The last 2-3 times he is attempting to have intercourse however he was unable to get an erection at all and he did use Viagra at least 2 occasions at 100 mg.  No improvement at all. Semi with cialis. Able to finish.   He is under a lot of stress admittedly.  Erectile dysfunction also a source of stress as it is still very important to him to be able to perform sexually.  He never had problems with erectile dysfunction even on the same dosing of hydrocodone previously and did not have any sexual issues prior to the surgery.  We reviewed together the surgery reported does not appear there was any injury and the surgery although in that region did not appear to directly involve the area of the prostate and so forth.  We did discuss that could've been nerve injury and so forth which will recover over time.  He has already discussed with the surgeon who did not feel it was related to the surgery.  He has already discussed with radiation oncology that apparently they will not be radiating in an area that should damage nerves so forth related to erectile function.  Total time over 25 minutes with over 50% counseling.      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  Currently he is on what sounds like Imodium, Lomotil and fiber with some control.  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  He discussed increasing fiber, reduction in the Lomotil and  Imodium and trying to establish some pattern.  He likely will need to extend his disability until he gets this under control  Review of systems: No fevers or chills, no other urinary complaints    PAST MEDICAL HISTORY:   1. ED.   2. HTN.   3. Smoker.   4. Allergic rhinitis with exposure to mold   5. Frequent bronchitis   6. Lumbar strain due to work on [employer]   7. Large colon polyp removed before age 50 - over 10-15 -yrs  8.  Chronic pain referable to ankle arthritis   9.  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 One younger brother with no medical problems    Vitals as above  Gen: no distress    Exam otherwise deferred,     Tr was seen today for medication refill.    Diagnoses and all orders for this visit:    Other chronic pain, less pain and an encouraged him to use less pain medications and eventually try to use less overall given that there could be opiate restrictions in the future    Erectile dysfunction, unspecified erectile dysfunction type, still has some function which is encouraging and some response to medications.  May need more time in that could be a psychogenic component which we discussed,     Rectal carcinoma,now with problems of diarrhea complicated by his surgery, recent x-ray treatment and so forth and all that should improve, he will continue to discuss with oncology and surgery    Other orders  -     hydrocodone-acetaminophen 5-325mg (NORCO) 5-325 mg per tablet; Take 1 tablet by mouth every 6 (six) hours as needed for Pain.  -     hydrocodone-acetaminophen 5-325mg (NORCO) 5-325 mg per tablet; Take 1 tablet by mouth every 6 (six) hours as needed for Pain.  -     hydrocodone-acetaminophen 5-325mg (NORCO) 5-325 mg per tablet; Take 1 tablet by mouth every 6 (six) hours as needed for  Pain.

## 2017-08-15 ENCOUNTER — LAB VISIT (OUTPATIENT)
Dept: LAB | Facility: HOSPITAL | Age: 62
End: 2017-08-15
Attending: INTERNAL MEDICINE
Payer: COMMERCIAL

## 2017-08-15 DIAGNOSIS — Z51.11 ENCOUNTER FOR CHEMOTHERAPY MANAGEMENT: ICD-10-CM

## 2017-08-15 DIAGNOSIS — C20 RECTAL CARCINOMA: ICD-10-CM

## 2017-08-15 LAB
ALBUMIN SERPL BCP-MCNC: 3.9 G/DL
ALP SERPL-CCNC: 75 U/L
ALT SERPL W/O P-5'-P-CCNC: 11 U/L
ANION GAP SERPL CALC-SCNC: 7 MMOL/L
AST SERPL-CCNC: 17 U/L
BASOPHILS # BLD AUTO: 0.01 K/UL
BASOPHILS NFR BLD: 0.2 %
BILIRUB SERPL-MCNC: 0.5 MG/DL
BUN SERPL-MCNC: 9 MG/DL
CALCIUM SERPL-MCNC: 9.2 MG/DL
CHLORIDE SERPL-SCNC: 105 MMOL/L
CO2 SERPL-SCNC: 27 MMOL/L
CREAT SERPL-MCNC: 1 MG/DL
DIFFERENTIAL METHOD: ABNORMAL
EOSINOPHIL # BLD AUTO: 0.3 K/UL
EOSINOPHIL NFR BLD: 4.1 %
ERYTHROCYTE [DISTWIDTH] IN BLOOD BY AUTOMATED COUNT: 12.6 %
EST. GFR  (AFRICAN AMERICAN): >60 ML/MIN/1.73 M^2
EST. GFR  (NON AFRICAN AMERICAN): >60 ML/MIN/1.73 M^2
GLUCOSE SERPL-MCNC: 91 MG/DL
HCT VFR BLD AUTO: 43.9 %
HGB BLD-MCNC: 15.2 G/DL
LYMPHOCYTES # BLD AUTO: 0.9 K/UL
LYMPHOCYTES NFR BLD: 14.5 %
MCH RBC QN AUTO: 34.9 PG
MCHC RBC AUTO-ENTMCNC: 34.6 G/DL
MCV RBC AUTO: 101 FL
MONOCYTES # BLD AUTO: 0.8 K/UL
MONOCYTES NFR BLD: 12.4 %
NEUTROPHILS # BLD AUTO: 4.2 K/UL
NEUTROPHILS NFR BLD: 68.8 %
PLATELET # BLD AUTO: 230 K/UL
PMV BLD AUTO: 8.9 FL
POTASSIUM SERPL-SCNC: 4.3 MMOL/L
PROT SERPL-MCNC: 7 G/DL
RBC # BLD AUTO: 4.35 M/UL
SODIUM SERPL-SCNC: 139 MMOL/L
WBC # BLD AUTO: 6.15 K/UL

## 2017-08-15 PROCEDURE — 85025 COMPLETE CBC W/AUTO DIFF WBC: CPT

## 2017-08-15 PROCEDURE — 80053 COMPREHEN METABOLIC PANEL: CPT

## 2017-08-15 PROCEDURE — 36415 COLL VENOUS BLD VENIPUNCTURE: CPT

## 2017-08-18 ENCOUNTER — OFFICE VISIT (OUTPATIENT)
Dept: HEMATOLOGY/ONCOLOGY | Facility: CLINIC | Age: 62
End: 2017-08-18
Payer: COMMERCIAL

## 2017-08-18 VITALS
TEMPERATURE: 98 F | SYSTOLIC BLOOD PRESSURE: 141 MMHG | WEIGHT: 187.94 LBS | OXYGEN SATURATION: 96 % | HEART RATE: 89 BPM | DIASTOLIC BLOOD PRESSURE: 84 MMHG | BODY MASS INDEX: 23.49 KG/M2

## 2017-08-18 DIAGNOSIS — K62.89 RECTAL PAIN: ICD-10-CM

## 2017-08-18 DIAGNOSIS — C20 RECTAL CARCINOMA: Primary | ICD-10-CM

## 2017-08-18 PROCEDURE — 3079F DIAST BP 80-89 MM HG: CPT | Mod: S$GLB,,, | Performed by: INTERNAL MEDICINE

## 2017-08-18 PROCEDURE — 3008F BODY MASS INDEX DOCD: CPT | Mod: S$GLB,,, | Performed by: INTERNAL MEDICINE

## 2017-08-18 PROCEDURE — 3077F SYST BP >= 140 MM HG: CPT | Mod: S$GLB,,, | Performed by: INTERNAL MEDICINE

## 2017-08-18 PROCEDURE — 99213 OFFICE O/P EST LOW 20 MIN: CPT | Mod: S$GLB,,, | Performed by: INTERNAL MEDICINE

## 2017-08-18 PROCEDURE — 99999 PR PBB SHADOW E&M-EST. PATIENT-LVL III: CPT | Mod: PBBFAC,,, | Performed by: INTERNAL MEDICINE

## 2017-08-18 RX ORDER — PRAMOXINE HYDROCHLORIDE HYDROCORTISONE ACETATE 100; 100 MG/10G; MG/10G
AEROSOL, FOAM TOPICAL 3 TIMES DAILY
Qty: 10 G | Refills: 1 | Status: SHIPPED | OUTPATIENT
Start: 2017-08-18 | End: 2017-11-28

## 2017-08-18 NOTE — PROGRESS NOTES
"Subjective:       Patient ID: Tr Chen is a 62 y.o. male.    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 CT imaging studies negative for evidence of distant disease.  1/27/2017 PET/CT imaging  reveals   hepatic metastatic lesion . He subsequently underwent CT guided liver bx 2/3/2017 - NEG for malignancy .     status post LAR,12/27/2016.pT3 pN1cM0 Stage IIIB  He is s/p adjuvant XELOX cycle 4  Completed 4/17/2017  Followed by chemo/RT initiated  5/22/2017   He completed XRT July 6, 2017  s/p partial completion of XELODA approx 6/21/2017 ( due to ASE)     CT a/p w/contrast 5/17/2017- no disease recurrence, numerous cystic lesions    Today, he reports rectal pain that his affecting his daily activities  He is no longer able to work due to pain  No longer has diarrhea  He feels " burnt from radiation from inside"   He reports stools semi-formed  He reports he is straining with BM  No SOB/CP  Mild fatigue  No N/V/abd pain    He is not able to work secondary to pain    He has been prescribed Proctofoam and Tucks Pads      Prev Hx: Pt  with past medical history of colonic polyps, hypertension, tobacco abuse, seen today in consultation for recently diagnosed rectosigmoid adenocarcinoma.  The patient reports intermittent rectal bleeding for the past 3 to 4 months.  He subsequently underwent a colonoscopy on 11/21/2016, which revealed polyps noted in the descending colon, sigmoid colon, and rectum and also noted for a malignant-appearing partially obstructing tumor in the distal sigmoid colon with ulceration. Pathology of the rectosigmoid mass revealed a tubulovillous adenoma and polyps benign..The patient reports he underwent a colonoscopy during his 40s for rectal bleeding.  He was diagnosed " with a large bleeding polyp, for which he underwent removal.  He did not undergo surveillance colonoscopies as advised.  No family history of colon cancer.  CT of the abdomen and pelvis on 2016 revealed thickening of the rectal wall, findings, rectosigmoid neoplasm at the rectosigmoid junction and numerous hepatic lesions compatible with hepatic cysts. He  subsequently underwent a rectal sigmoidectomy on 2016 .  Pathology revealed an adenocarcinoma, low grade, clear margins. The patient underwent removal of 24 lymph nodes with 0 lymph nodes involved.  Evidence of tumor deposits noted with pathologic staging pT3, pN1c.      PAST MEDICAL HISTORY:  Erectile dysfunction, colonic polyps, hypertension, tobacco abuse disorder, chronic pain.    PAST SURGICAL HISTORY:  As above.    SOCIAL HISTORY:  He is a former smoker.  He has a greater than 40-pack-year.  He drinks socially.  He is employed as an  for [employer]  .    FAMILY HISTORY:  Mom diagnosed with breast cancer at age 61.  Dad  at age 87 from Alzheimer's.  He has one sibling, brother in overall good health.    Therapy:  s/p Xeloda  1500mg bid Xeloda 2 wks on 1 wk off / Oxaliplatin 130mg/m2 Day 1 q 21 d  X 4 completed 2017     S/p partial completion of concurrent chemo/RT completed       Review of Systems   Constitutional: Positive for fatigue. Negative for appetite change, fever and unexpected weight change.   HENT: Negative for mouth sores and nosebleeds.    Eyes: Negative for visual disturbance.   Respiratory: Negative for cough and shortness of breath.    Cardiovascular: Negative for chest pain and leg swelling.   Gastrointestinal: Negative for abdominal pain, blood in stool, constipation, diarrhea, nausea and vomiting.   Genitourinary: Negative for frequency and hematuria.   Musculoskeletal: Negative for arthralgias and back pain.   Skin: Negative for rash.   Neurological: Negative for dizziness,  weakness, light-headedness and headaches.   Hematological: Negative for adenopathy.       Objective:       Vitals:    08/18/17 0930   BP: (!) 141/84   BP Location: Left arm   Patient Position: Sitting   BP Method: Medium (Automatic)   Pulse: 89   Temp: 97.7 °F (36.5 °C)   TempSrc: Oral   SpO2: 96%   Weight: 85.2 kg (187 lb 15.1 oz)       Physical Exam   Constitutional: He is oriented to person, place, and time. He appears well-developed and well-nourished.   HENT:   Head: Normocephalic.   Mouth/Throat: Oropharynx is clear and moist. No oropharyngeal exudate.   Eyes: Conjunctivae are normal. No scleral icterus.   Neck: Normal range of motion. Neck supple. No thyromegaly present.   Cardiovascular: Normal rate, regular rhythm and normal heart sounds.    No murmur heard.  Pulmonary/Chest: Effort normal and breath sounds normal. He has no wheezes. He has no rales.   Abdominal: Soft. Bowel sounds are normal. He exhibits no distension and no mass. There is no hepatosplenomegaly. There is no tenderness. There is no rebound and no guarding.   Musculoskeletal: Normal range of motion. He exhibits no edema.   Lymphadenopathy:     He has no cervical adenopathy.     He has no axillary adenopathy.        Right: No supraclavicular adenopathy present.        Left: No supraclavicular adenopathy present.   Neurological: He is alert and oriented to person, place, and time. No cranial nerve deficit.   Skin: No rash noted. No erythema.   Psychiatric: He has a normal mood and affect.       1/27/2017 PET/CT reveals   hepatic metastatic lesion      LIVER BIOPSIES, CORE BIOPSIES-BENIGN HEPATIC PARENCHYMA. THERE IS MILD MACRO VACUOLAR FATTY CHANGE AND MILD NONSPECIFIC FOCAL CHRONIC TRIADITIS. THERE IS NO EVIDENCE OF TUMOR OR GRANULOMATA PRESENT. A FOCAL AREA OF NONSPECIFIC FIBROSIS SUGGESTIVE OF SCAR TISSUE IS NOTED. THERE IS NO EVIDENCE OF MALIGNANCY.      Results for BRANDY OLIVA (MRN [id number]) as of 8/18/2017 09:58   Ref. Range  1/26/2017 11:35 3/24/2017 10:24 4/11/2017 11:45 5/5/2017 09:22 6/8/2017 11:07   CEA Latest Ref Range: 0.0 - 5.0 ng/mL 6.1 (H) 8.3 (H) 8.9 (H) 8.2 (H) 6.8 (H)         CT a/p w/contrast 5/17/2017  Interval sigmoidectomy.    Numerous cystic liver lesions, unchanged from the 11/22/16 exam.    Assessment:       1. Rectal carcinoma    2. 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    CT imaging - negative for evidence of distant disease.     PET/CT imaging studies - hepatic lesion  S/p liver bx - benign   status post LAR,12/27/2016.pT3 pN1cM0 Stage IIIB  He is s/p adjuvant XELOX cycle 4  Completed 4/17/2017  Followed by chemo/RT initiated  5/22/2017   He completed XRT July 6, 2017  s/p partial completion of XELODA approx 6/21/2017 ( due to ASE)    Continue supportive care for rectal pain  Anticipate improvement in sx's in near future    F/u  1 month with  CBC, CMP prior to f/u     CC: Darrick Jay M.D.

## 2017-09-22 ENCOUNTER — LAB VISIT (OUTPATIENT)
Dept: LAB | Facility: HOSPITAL | Age: 62
End: 2017-09-22
Attending: INTERNAL MEDICINE
Payer: COMMERCIAL

## 2017-09-22 DIAGNOSIS — Z51.11 ENCOUNTER FOR CHEMOTHERAPY MANAGEMENT: ICD-10-CM

## 2017-09-22 DIAGNOSIS — C20 RECTAL CARCINOMA: ICD-10-CM

## 2017-09-22 LAB
ALBUMIN SERPL BCP-MCNC: 3.8 G/DL
ALP SERPL-CCNC: 71 U/L
ALT SERPL W/O P-5'-P-CCNC: 20 U/L
ANION GAP SERPL CALC-SCNC: 9 MMOL/L
AST SERPL-CCNC: 24 U/L
BASOPHILS # BLD AUTO: 0.03 K/UL
BASOPHILS NFR BLD: 0.5 %
BILIRUB SERPL-MCNC: 0.5 MG/DL
BUN SERPL-MCNC: 12 MG/DL
CALCIUM SERPL-MCNC: 9.2 MG/DL
CEA SERPL-MCNC: 6.3 NG/ML
CHLORIDE SERPL-SCNC: 108 MMOL/L
CO2 SERPL-SCNC: 23 MMOL/L
CREAT SERPL-MCNC: 1 MG/DL
DIFFERENTIAL METHOD: ABNORMAL
EOSINOPHIL # BLD AUTO: 0.1 K/UL
EOSINOPHIL NFR BLD: 1.8 %
ERYTHROCYTE [DISTWIDTH] IN BLOOD BY AUTOMATED COUNT: 12.2 %
EST. GFR  (AFRICAN AMERICAN): >60 ML/MIN/1.73 M^2
EST. GFR  (NON AFRICAN AMERICAN): >60 ML/MIN/1.73 M^2
GLUCOSE SERPL-MCNC: 104 MG/DL
HCT VFR BLD AUTO: 43.9 %
HGB BLD-MCNC: 15.6 G/DL
LYMPHOCYTES # BLD AUTO: 1 K/UL
LYMPHOCYTES NFR BLD: 15.2 %
MCH RBC QN AUTO: 33.5 PG
MCHC RBC AUTO-ENTMCNC: 35.5 G/DL
MCV RBC AUTO: 94 FL
MONOCYTES # BLD AUTO: 0.7 K/UL
MONOCYTES NFR BLD: 10.4 %
NEUTROPHILS # BLD AUTO: 4.7 K/UL
NEUTROPHILS NFR BLD: 71.8 %
PLATELET # BLD AUTO: 271 K/UL
PMV BLD AUTO: 9.2 FL
POTASSIUM SERPL-SCNC: 4 MMOL/L
PROT SERPL-MCNC: 7.2 G/DL
RBC # BLD AUTO: 4.65 M/UL
SODIUM SERPL-SCNC: 140 MMOL/L
WBC # BLD AUTO: 6.57 K/UL

## 2017-09-22 PROCEDURE — 36415 COLL VENOUS BLD VENIPUNCTURE: CPT

## 2017-09-22 PROCEDURE — 80053 COMPREHEN METABOLIC PANEL: CPT

## 2017-09-22 PROCEDURE — 85025 COMPLETE CBC W/AUTO DIFF WBC: CPT

## 2017-09-22 PROCEDURE — 82378 CARCINOEMBRYONIC ANTIGEN: CPT

## 2017-09-28 ENCOUNTER — OFFICE VISIT (OUTPATIENT)
Dept: HEMATOLOGY/ONCOLOGY | Facility: CLINIC | Age: 62
End: 2017-09-28
Payer: COMMERCIAL

## 2017-09-28 VITALS
BODY MASS INDEX: 22.94 KG/M2 | HEART RATE: 70 BPM | DIASTOLIC BLOOD PRESSURE: 68 MMHG | WEIGHT: 184.5 LBS | HEIGHT: 75 IN | TEMPERATURE: 99 F | OXYGEN SATURATION: 97 % | SYSTOLIC BLOOD PRESSURE: 116 MMHG

## 2017-09-28 DIAGNOSIS — C20 RECTAL CARCINOMA: Primary | ICD-10-CM

## 2017-09-28 DIAGNOSIS — K62.89 RECTAL PAIN: ICD-10-CM

## 2017-09-28 PROCEDURE — 99213 OFFICE O/P EST LOW 20 MIN: CPT | Mod: S$GLB,,, | Performed by: INTERNAL MEDICINE

## 2017-09-28 PROCEDURE — 3074F SYST BP LT 130 MM HG: CPT | Mod: S$GLB,,, | Performed by: INTERNAL MEDICINE

## 2017-09-28 PROCEDURE — 3008F BODY MASS INDEX DOCD: CPT | Mod: S$GLB,,, | Performed by: INTERNAL MEDICINE

## 2017-09-28 PROCEDURE — 3078F DIAST BP <80 MM HG: CPT | Mod: S$GLB,,, | Performed by: INTERNAL MEDICINE

## 2017-09-28 PROCEDURE — 99999 PR PBB SHADOW E&M-EST. PATIENT-LVL IV: CPT | Mod: PBBFAC,,, | Performed by: INTERNAL MEDICINE

## 2017-09-28 NOTE — PROGRESS NOTES
Subjective:       Patient ID: Tr Chen is a 62 y.o. male.    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 CT imaging studies negative for evidence of distant disease.  1/27/2017 PET/CT imaging  reveals   hepatic metastatic lesion . He subsequently underwent CT guided liver bx 2/3/2017 - NEG for malignancy .     status post LAR,12/27/2016.pT3 pN1cM0 Stage IIIB  He is s/p adjuvant XELOX cycle 4  Completed 4/17/2017  Followed by chemo/RT initiated  5/22/2017   He completed XRT July 6, 2017  s/p partial completion of XELODA approx 6/21/2017 ( due to ASE)     CT a/p w/contrast 5/17/2017- no disease recurrence, numerous cystic lesions    Today, he continues with rectal pain  He is followed by Surgery  He has been prescribed a compound cream to affected area with improvement in symptoms  He reports less sleep disturbances    He is no longer able to work due to pain  No longer has diarrhea    No SOB/CP  Mild fatigue  No N/V/abd pain  He is taking pain meds 3x wk          Prev Hx: Pt  with past medical history of colonic polyps, hypertension, tobacco abuse, seen today in consultation for recently diagnosed rectosigmoid adenocarcinoma.  The patient reports intermittent rectal bleeding for the past 3 to 4 months.  He subsequently underwent a colonoscopy on 11/21/2016, which revealed polyps noted in the descending colon, sigmoid colon, and rectum and also noted for a malignant-appearing partially obstructing tumor in the distal sigmoid colon with ulceration. Pathology of the rectosigmoid mass revealed a tubulovillous adenoma and polyps benign..The patient reports he underwent a colonoscopy during his 40s for rectal bleeding.  He was diagnosed with a large bleeding polyp, for which he underwent  removal.  He did not undergo surveillance colonoscopies as advised.  No family history of colon cancer.  CT of the abdomen and pelvis on 2016 revealed thickening of the rectal wall, findings, rectosigmoid neoplasm at the rectosigmoid junction and numerous hepatic lesions compatible with hepatic cysts. He  subsequently underwent a rectal sigmoidectomy on 2016 .  Pathology revealed an adenocarcinoma, low grade, clear margins. The patient underwent removal of 24 lymph nodes with 0 lymph nodes involved.  Evidence of tumor deposits noted with pathologic staging pT3, pN1c.      PAST MEDICAL HISTORY:  Erectile dysfunction, colonic polyps, hypertension, tobacco abuse disorder, chronic pain.    PAST SURGICAL HISTORY:  As above.    SOCIAL HISTORY:  He is a former smoker.  He has a greater than 40-pack-year.  He drinks socially.  He is employed as an  for [employer]  .    FAMILY HISTORY:  Mom diagnosed with breast cancer at age 61.  Dad  at age 87 from Alzheimer's.  He has one sibling, brother in overall good health.    Therapy:  s/p Xeloda  1500mg bid Xeloda 2 wks on 1 wk off / Oxaliplatin 130mg/m2 Day 1 q 21 d  X 4 completed 2017     S/p partial completion of concurrent chemo/RT completed       Review of Systems   Constitutional: Positive for fatigue. Negative for appetite change, fever and unexpected weight change.   HENT: Negative for mouth sores and nosebleeds.    Eyes: Negative for visual disturbance.   Respiratory: Negative for cough and shortness of breath.    Cardiovascular: Negative for chest pain and leg swelling.   Gastrointestinal: Negative for abdominal pain, blood in stool, constipation, diarrhea, nausea and vomiting.   Genitourinary: Negative for frequency and hematuria.        Rectal pain   Musculoskeletal: Negative for arthralgias and back pain.   Skin: Negative for rash.   Neurological: Negative for dizziness, weakness, light-headedness and  "headaches.   Hematological: Negative for adenopathy.       Objective:       Vitals:    09/28/17 0909   BP: 116/68   BP Location: Left arm   Patient Position: Sitting   BP Method: Medium (Manual)   Pulse: 70   Temp: 98.8 °F (37.1 °C)   TempSrc: Oral   SpO2: 97%   Weight: 83.7 kg (184 lb 8.4 oz)   Height: 6' 3" (1.905 m)       Physical Exam   Constitutional: He is oriented to person, place, and time. He appears well-developed and well-nourished.   HENT:   Head: Normocephalic.   Mouth/Throat: Oropharynx is clear and moist. No oropharyngeal exudate.   Eyes: Conjunctivae are normal. No scleral icterus.   Neck: Normal range of motion. Neck supple. No thyromegaly present.   Cardiovascular: Normal rate, regular rhythm and normal heart sounds.    No murmur heard.  Pulmonary/Chest: Effort normal and breath sounds normal. He has no wheezes. He has no rales.   Abdominal: Soft. Bowel sounds are normal. He exhibits no distension and no mass. There is no hepatosplenomegaly. There is no tenderness. There is no rebound and no guarding.   Musculoskeletal: Normal range of motion. He exhibits no edema.   Lymphadenopathy:     He has no cervical adenopathy.     He has no axillary adenopathy.        Right: No supraclavicular adenopathy present.        Left: No supraclavicular adenopathy present.   Neurological: He is alert and oriented to person, place, and time. No cranial nerve deficit.   Skin: No rash noted. No erythema.   Psychiatric: He has a normal mood and affect.       1/27/2017 PET/CT reveals   hepatic metastatic lesion      LIVER BIOPSIES, CORE BIOPSIES-BENIGN HEPATIC PARENCHYMA. THERE IS MILD MACRO VACUOLAR FATTY CHANGE AND MILD NONSPECIFIC FOCAL CHRONIC TRIADITIS. THERE IS NO EVIDENCE OF TUMOR OR GRANULOMATA PRESENT. A FOCAL AREA OF NONSPECIFIC FIBROSIS SUGGESTIVE OF SCAR TISSUE IS NOTED. THERE IS NO EVIDENCE OF MALIGNANCY.        Results for BRANDY OLIVA (MRN [id number]) as of 9/28/2017 09:17   Ref. Range 3/24/2017 10:24 " 4/11/2017 11:45 5/5/2017 09:22 6/8/2017 11:07 9/22/2017 11:00   CEA Latest Ref Range: 0.0 - 5.0 ng/mL 8.3 (H) 8.9 (H) 8.2 (H) 6.8 (H) 6.3 (H)         CT a/p w/contrast 5/17/2017  Interval sigmoidectomy.    Numerous cystic liver lesions, unchanged from the 11/22/16 exam.    Assessment:       1. Rectal carcinoma    2. Rectal pain        Plan:   1-2   Pt clinically stable  62-year-old with recently diagnosed rectosigmoid adenocarcinoma, status post LAR,12/27/2016.pT3 pN1c with 0 of 24 lymph nodes involved with tumor deposits present.    CT imaging - negative for evidence of distant disease.     PET/CT imaging studies - hepatic lesion  S/p liver bx - benign   status post LAR,12/27/2016.pT3 pN1cM0 Stage IIIB  He is s/p adjuvant XELOX cycle 4  Completed 4/17/2017  Followed by chemo/RT initiated  5/22/2017   He completed XRT July 6, 2017  s/p partial completion of XELODA approx 6/21/2017 ( due to ASE)    Continue supportive care for rectal pain- sx's improved  Plan follow-up imaging studies prior to f/u    F/u  2 month with  CBC, CMP,CEA prior to f/u     CC: Darrick Jay M.D.

## 2017-11-01 ENCOUNTER — OFFICE VISIT (OUTPATIENT)
Dept: FAMILY MEDICINE | Facility: CLINIC | Age: 62
End: 2017-11-01
Payer: COMMERCIAL

## 2017-11-01 VITALS
OXYGEN SATURATION: 97 % | HEART RATE: 80 BPM | TEMPERATURE: 99 F | BODY MASS INDEX: 23.69 KG/M2 | HEIGHT: 75 IN | SYSTOLIC BLOOD PRESSURE: 138 MMHG | WEIGHT: 190.5 LBS | DIASTOLIC BLOOD PRESSURE: 80 MMHG

## 2017-11-01 DIAGNOSIS — G89.3 CANCER RELATED PAIN: ICD-10-CM

## 2017-11-01 DIAGNOSIS — N52.9 ERECTILE DYSFUNCTION, UNSPECIFIED ERECTILE DYSFUNCTION TYPE: ICD-10-CM

## 2017-11-01 DIAGNOSIS — K62.89 RECTAL PAIN: ICD-10-CM

## 2017-11-01 DIAGNOSIS — Z12.5 SCREENING FOR PROSTATE CANCER: ICD-10-CM

## 2017-11-01 DIAGNOSIS — F52.32 ANORGASMIA OF MALE: ICD-10-CM

## 2017-11-01 DIAGNOSIS — C20 RECTAL CARCINOMA: ICD-10-CM

## 2017-11-01 DIAGNOSIS — I10 ESSENTIAL HYPERTENSION: ICD-10-CM

## 2017-11-01 DIAGNOSIS — G89.29 OTHER CHRONIC PAIN: Primary | ICD-10-CM

## 2017-11-01 PROCEDURE — 90471 IMMUNIZATION ADMIN: CPT | Mod: S$GLB,,, | Performed by: INTERNAL MEDICINE

## 2017-11-01 PROCEDURE — 99999 PR PBB SHADOW E&M-EST. PATIENT-LVL III: CPT | Mod: PBBFAC,,, | Performed by: INTERNAL MEDICINE

## 2017-11-01 PROCEDURE — 90686 IIV4 VACC NO PRSV 0.5 ML IM: CPT | Mod: S$GLB,,, | Performed by: INTERNAL MEDICINE

## 2017-11-01 PROCEDURE — 90472 IMMUNIZATION ADMIN EACH ADD: CPT | Mod: S$GLB,,, | Performed by: INTERNAL MEDICINE

## 2017-11-01 PROCEDURE — 99214 OFFICE O/P EST MOD 30 MIN: CPT | Mod: 25,S$GLB,, | Performed by: INTERNAL MEDICINE

## 2017-11-01 PROCEDURE — 90732 PPSV23 VACC 2 YRS+ SUBQ/IM: CPT | Mod: S$GLB,,, | Performed by: INTERNAL MEDICINE

## 2017-11-01 RX ORDER — HYDROCODONE BITARTRATE AND ACETAMINOPHEN 5; 325 MG/1; MG/1
1 TABLET ORAL EVERY 6 HOURS PRN
Qty: 120 TABLET | Refills: 0 | Status: SHIPPED | OUTPATIENT
Start: 2018-01-01 | End: 2017-11-28 | Stop reason: SDUPTHER

## 2017-11-01 RX ORDER — TADALAFIL 20 MG/1
20 TABLET ORAL
Qty: 12 TABLET | Refills: 11 | Status: SHIPPED | OUTPATIENT
Start: 2017-11-01 | End: 2017-11-01 | Stop reason: SDUPTHER

## 2017-11-01 RX ORDER — HYDROCODONE BITARTRATE AND ACETAMINOPHEN 5; 325 MG/1; MG/1
1 TABLET ORAL EVERY 6 HOURS PRN
Qty: 120 TABLET | Refills: 0 | Status: SHIPPED | OUTPATIENT
Start: 2017-11-01 | End: 2018-01-30 | Stop reason: SDUPTHER

## 2017-11-01 RX ORDER — HYDROCODONE BITARTRATE AND ACETAMINOPHEN 5; 325 MG/1; MG/1
1 TABLET ORAL EVERY 6 HOURS PRN
Qty: 120 TABLET | Refills: 0 | Status: SHIPPED | OUTPATIENT
Start: 2017-12-01 | End: 2017-11-28 | Stop reason: SDUPTHER

## 2017-11-01 RX ORDER — TADALAFIL 20 MG/1
20 TABLET ORAL
Qty: 12 TABLET | Refills: 11 | Status: SHIPPED | OUTPATIENT
Start: 2017-11-01 | End: 2018-01-30 | Stop reason: SDUPTHER

## 2017-11-01 NOTE — PROGRESS NOTES
Chief complaint refills and follow-up from colon cancer    62-year-old white male who finally did have his needed colonoscopy whereupon colon cancer was found.  He recently had a low anterior resection of the surgery notes were reviewed and appears he was doing well.  Since the surgery however he has only been able to have a semierect erection which also led to inability to orgasm.  The last 2-3 times he is attempting to have intercourse however he was unable to get an erection at all and he did use Viagra at least 2 occasions at 100 mg.  No improvement at all. Semi with cialis. Able to finish.   He is under a lot of stress admittedly.  Erectile dysfunction also a source of stress as it is still very important to him to be able to perform sexually.  He never had problems with erectile dysfunction even on the same dosing of hydrocodone previously and did not have any sexual issues prior to the surgery.  We reviewed together the surgery reported does not appear there was any injury and the surgery although in that region did not appear to directly involve the area of the prostate and so forth.  We did discuss that could've been nerve injury and so forth which will recover over time.  He has already discussed with the surgeon who did not feel it was related to the surgery.  He has already discussed with radiation oncology that apparently they will not be radiating in an area that should damage nerves so forth related to erectile function.  We again discussed possibly seeing urology.  He wants to wait until he gets back to work.  He thinks he'll be back maybe in a month.  He can almost orgasm but just can't get that he thinks it might be in his mind and likely is.  Still good erections with the Ondina Total time over 25 minutes with over 50% counseling.  Patient did have some aggravation of hemorrhoids and so forth secondary to the diarrhea which has resolved.  He apparently also developed a fissure which cause severe  pain but no bleeding.  He has seen his surgeon who gave him an ointment and it is responding to this is the first week that he can have a bowel movement without severe pain.  He is using the pain medications for this pain as well.  Review of systems: No fevers or chills, no other urinary complaints, no further GI complaints    PAST MEDICAL HISTORY:   1. ED.   2. HTN.   3. Smoker.   4. Allergic rhinitis with exposure to mold   5. Frequent bronchitis   6. Lumbar strain due to work on [employer]   7. Large colon polyp removed before age 50 - over 10-15 -yrs  8.  Chronic pain referable to ankle arthritis   9.  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 One younger brother with no medical problems    Vitals as above  Gen: no distress    Exam otherwise deferred,     Tr was seen today for medication refill.    Diagnoses and all orders for this visit:    Other chronic pain, multiple sources of pain including his most recent anal fissure, rectal pain, cancer related pain and arthritic pain    Erectile dysfunction, unspecified erectile dysfunction type, response to Cialis  -     Testosterone; Future    Essential hypertension, fair control  -     TSH; Future    Rectal carcinoma, review treatment plan, given his ongoing smoking as well as recent chemotherapy, he would be a appropriate candidate for Pneumovax and I'll give him his flu shot today as well    Cancer related pain    Rectal pain, apparently has a fissure    Anorgasmia of male, consider referral to Dr. Costello    -     Testosterone; Future    Screening for prostate cancer, overdue, had to    -     PSA, Screening; Future    Other orders  -     hydrocodone-acetaminophen 5-325mg (NORCO) 5-325 mg per tablet; Take 1 tablet by mouth every 6 (six) hours as needed for  Pain.  -     Influenza - Quadrivalent (3 years & older) (PF)  -     hydrocodone-acetaminophen 5-325mg (NORCO) 5-325 mg per tablet; Take 1 tablet by mouth every 6 (six) hours as needed for Pain.  -     hydrocodone-acetaminophen 5-325mg (NORCO) 5-325 mg per tablet; Take 1 tablet by mouth every 6 (six) hours as needed for Pain.  -     Discontinue: tadalafil (CIALIS) 20 MG Tab; Take 1 tablet (20 mg total) by mouth every 36 (thirty-six) hours.  -     Influenza - Quadrivalent (3 years & older)  -     Pneumococcal Polysaccharide Vaccine (23 Valent) (SQ/IM)  -     tadalafil (CIALIS) 20 MG Tab; Take 1 tablet (20 mg total) by mouth every 36 (thirty-six) hours.

## 2017-11-20 ENCOUNTER — LAB VISIT (OUTPATIENT)
Dept: LAB | Facility: HOSPITAL | Age: 62
End: 2017-11-20
Attending: INTERNAL MEDICINE
Payer: COMMERCIAL

## 2017-11-20 ENCOUNTER — HOSPITAL ENCOUNTER (OUTPATIENT)
Dept: RADIOLOGY | Facility: HOSPITAL | Age: 62
Discharge: HOME | End: 2017-11-20
Attending: INTERNAL MEDICINE
Payer: COMMERCIAL

## 2017-11-20 DIAGNOSIS — I10 ESSENTIAL HYPERTENSION: ICD-10-CM

## 2017-11-20 DIAGNOSIS — C20 RECTAL CARCINOMA: ICD-10-CM

## 2017-11-20 DIAGNOSIS — Z12.5 SCREENING FOR PROSTATE CANCER: ICD-10-CM

## 2017-11-20 DIAGNOSIS — N52.9 ERECTILE DYSFUNCTION, UNSPECIFIED ERECTILE DYSFUNCTION TYPE: ICD-10-CM

## 2017-11-20 DIAGNOSIS — F52.32 ANORGASMIA OF MALE: ICD-10-CM

## 2017-11-20 LAB
ALBUMIN SERPL BCP-MCNC: 3.8 G/DL
ALP SERPL-CCNC: 73 U/L
ALT SERPL W/O P-5'-P-CCNC: 18 U/L
ANION GAP SERPL CALC-SCNC: 9 MMOL/L
AST SERPL-CCNC: 21 U/L
BASOPHILS # BLD AUTO: 0.03 K/UL
BASOPHILS NFR BLD: 0.6 %
BILIRUB SERPL-MCNC: 0.6 MG/DL
BUN SERPL-MCNC: 8 MG/DL
CALCIUM SERPL-MCNC: 9.4 MG/DL
CEA SERPL-MCNC: 7.1 NG/ML
CHLORIDE SERPL-SCNC: 109 MMOL/L
CO2 SERPL-SCNC: 24 MMOL/L
COMPLEXED PSA SERPL-MCNC: 0.88 NG/ML
CREAT SERPL-MCNC: 1 MG/DL
DIFFERENTIAL METHOD: ABNORMAL
EOSINOPHIL # BLD AUTO: 0.2 K/UL
EOSINOPHIL NFR BLD: 3.9 %
ERYTHROCYTE [DISTWIDTH] IN BLOOD BY AUTOMATED COUNT: 13.3 %
EST. GFR  (AFRICAN AMERICAN): >60 ML/MIN/1.73 M^2
EST. GFR  (NON AFRICAN AMERICAN): >60 ML/MIN/1.73 M^2
GLUCOSE SERPL-MCNC: 98 MG/DL
HCT VFR BLD AUTO: 47.6 %
HGB BLD-MCNC: 16.3 G/DL
LYMPHOCYTES # BLD AUTO: 0.9 K/UL
LYMPHOCYTES NFR BLD: 18.1 %
MCH RBC QN AUTO: 32.9 PG
MCHC RBC AUTO-ENTMCNC: 34.2 G/DL
MCV RBC AUTO: 96 FL
MONOCYTES # BLD AUTO: 0.7 K/UL
MONOCYTES NFR BLD: 13.8 %
NEUTROPHILS # BLD AUTO: 3.1 K/UL
NEUTROPHILS NFR BLD: 62.8 %
PLATELET # BLD AUTO: 273 K/UL
PMV BLD AUTO: 8.8 FL
POTASSIUM SERPL-SCNC: 5 MMOL/L
PROT SERPL-MCNC: 7.4 G/DL
RBC # BLD AUTO: 4.96 M/UL
SODIUM SERPL-SCNC: 142 MMOL/L
TESTOST SERPL-MCNC: 553 NG/DL
TSH SERPL DL<=0.005 MIU/L-ACNC: 1.04 UIU/ML
WBC # BLD AUTO: 4.86 K/UL

## 2017-11-20 PROCEDURE — 71260 CT THORAX DX C+: CPT | Mod: 26,,, | Performed by: RADIOLOGY

## 2017-11-20 PROCEDURE — 25500020 PHARM REV CODE 255: Performed by: INTERNAL MEDICINE

## 2017-11-20 PROCEDURE — 85025 COMPLETE CBC W/AUTO DIFF WBC: CPT

## 2017-11-20 PROCEDURE — 36415 COLL VENOUS BLD VENIPUNCTURE: CPT

## 2017-11-20 PROCEDURE — 84153 ASSAY OF PSA TOTAL: CPT

## 2017-11-20 PROCEDURE — 74177 CT ABD & PELVIS W/CONTRAST: CPT | Mod: 26,,, | Performed by: RADIOLOGY

## 2017-11-20 PROCEDURE — 80053 COMPREHEN METABOLIC PANEL: CPT

## 2017-11-20 PROCEDURE — 84443 ASSAY THYROID STIM HORMONE: CPT

## 2017-11-20 PROCEDURE — 74177 CT ABD & PELVIS W/CONTRAST: CPT | Mod: TC

## 2017-11-20 PROCEDURE — 84403 ASSAY OF TOTAL TESTOSTERONE: CPT

## 2017-11-20 PROCEDURE — 71260 CT THORAX DX C+: CPT | Mod: TC

## 2017-11-20 PROCEDURE — 82378 CARCINOEMBRYONIC ANTIGEN: CPT

## 2017-11-20 RX ADMIN — IOHEXOL 100 ML: 350 INJECTION, SOLUTION INTRAVENOUS at 09:11

## 2017-11-20 RX ADMIN — IOHEXOL 15 ML: 300 INJECTION, SOLUTION INTRAVENOUS at 09:11

## 2017-11-28 ENCOUNTER — OFFICE VISIT (OUTPATIENT)
Dept: HEMATOLOGY/ONCOLOGY | Facility: CLINIC | Age: 62
End: 2017-11-28
Payer: COMMERCIAL

## 2017-11-28 VITALS
BODY MASS INDEX: 24.18 KG/M2 | HEART RATE: 75 BPM | HEIGHT: 75 IN | SYSTOLIC BLOOD PRESSURE: 156 MMHG | OXYGEN SATURATION: 98 % | DIASTOLIC BLOOD PRESSURE: 86 MMHG | TEMPERATURE: 98 F | WEIGHT: 194.44 LBS

## 2017-11-28 DIAGNOSIS — C20 RECTAL CARCINOMA: Primary | ICD-10-CM

## 2017-11-28 DIAGNOSIS — Z92.21 HISTORY OF CHEMOTHERAPY: ICD-10-CM

## 2017-11-28 PROCEDURE — 99213 OFFICE O/P EST LOW 20 MIN: CPT | Mod: S$GLB,,, | Performed by: INTERNAL MEDICINE

## 2017-11-28 PROCEDURE — 99999 PR PBB SHADOW E&M-EST. PATIENT-LVL III: CPT | Mod: PBBFAC,,, | Performed by: INTERNAL MEDICINE

## 2017-12-26 ENCOUNTER — ANESTHESIA EVENT (OUTPATIENT)
Dept: ENDOSCOPY | Facility: HOSPITAL | Age: 62
End: 2017-12-26
Payer: COMMERCIAL

## 2017-12-27 ENCOUNTER — ANESTHESIA (OUTPATIENT)
Dept: ENDOSCOPY | Facility: HOSPITAL | Age: 62
End: 2017-12-27
Payer: COMMERCIAL

## 2017-12-27 ENCOUNTER — HOSPITAL ENCOUNTER (OUTPATIENT)
Facility: HOSPITAL | Age: 62
Discharge: HOME | End: 2017-12-27
Attending: INTERNAL MEDICINE | Admitting: INTERNAL MEDICINE
Payer: COMMERCIAL

## 2017-12-27 ENCOUNTER — SURGERY (OUTPATIENT)
Age: 62
End: 2017-12-27

## 2017-12-27 VITALS
HEART RATE: 86 BPM | TEMPERATURE: 98 F | WEIGHT: 190 LBS | DIASTOLIC BLOOD PRESSURE: 78 MMHG | SYSTOLIC BLOOD PRESSURE: 120 MMHG | BODY MASS INDEX: 23.62 KG/M2 | HEIGHT: 75 IN | OXYGEN SATURATION: 97 % | RESPIRATION RATE: 18 BRPM

## 2017-12-27 DIAGNOSIS — Z12.11 SCREEN FOR COLON CANCER: ICD-10-CM

## 2017-12-27 PROCEDURE — 63600175 PHARM REV CODE 636 W HCPCS: Performed by: NURSE ANESTHETIST, CERTIFIED REGISTERED

## 2017-12-27 PROCEDURE — 25000003 PHARM REV CODE 250: Performed by: ANESTHESIOLOGY

## 2017-12-27 PROCEDURE — 88305 TISSUE EXAM BY PATHOLOGIST: CPT | Performed by: PATHOLOGY

## 2017-12-27 PROCEDURE — 27201089 HC SNARE, DISP (ANY): Performed by: INTERNAL MEDICINE

## 2017-12-27 PROCEDURE — 88305 TISSUE EXAM BY PATHOLOGIST: CPT | Mod: 26,,, | Performed by: PATHOLOGY

## 2017-12-27 PROCEDURE — 45385 COLONOSCOPY W/LESION REMOVAL: CPT | Performed by: INTERNAL MEDICINE

## 2017-12-27 PROCEDURE — 37000009 HC ANESTHESIA EA ADD 15 MINS: Performed by: INTERNAL MEDICINE

## 2017-12-27 PROCEDURE — 37000008 HC ANESTHESIA 1ST 15 MINUTES: Performed by: INTERNAL MEDICINE

## 2017-12-27 PROCEDURE — D9220A PRA ANESTHESIA: Mod: CRNA,,, | Performed by: NURSE ANESTHETIST, CERTIFIED REGISTERED

## 2017-12-27 PROCEDURE — D9220A PRA ANESTHESIA: Mod: ANES,,, | Performed by: ANESTHESIOLOGY

## 2017-12-27 RX ORDER — LIDOCAINE HCL/PF 100 MG/5ML
SYRINGE (ML) INTRAVENOUS
Status: DISCONTINUED | OUTPATIENT
Start: 2017-12-27 | End: 2017-12-27

## 2017-12-27 RX ORDER — PROPOFOL 10 MG/ML
VIAL (ML) INTRAVENOUS
Status: COMPLETED
Start: 2017-12-27 | End: 2017-12-27

## 2017-12-27 RX ORDER — LIDOCAINE HYDROCHLORIDE 20 MG/ML
INJECTION, SOLUTION EPIDURAL; INFILTRATION; INTRACAUDAL; PERINEURAL
Status: DISCONTINUED
Start: 2017-12-27 | End: 2017-12-27 | Stop reason: HOSPADM

## 2017-12-27 RX ORDER — PROPOFOL 10 MG/ML
VIAL (ML) INTRAVENOUS
Status: DISCONTINUED | OUTPATIENT
Start: 2017-12-27 | End: 2017-12-27

## 2017-12-27 RX ORDER — SODIUM CHLORIDE 9 MG/ML
INJECTION, SOLUTION INTRAVENOUS CONTINUOUS
Status: DISCONTINUED | OUTPATIENT
Start: 2017-12-27 | End: 2017-12-27 | Stop reason: HOSPADM

## 2017-12-27 RX ORDER — LIDOCAINE HYDROCHLORIDE 10 MG/ML
5 INJECTION, SOLUTION EPIDURAL; INFILTRATION; INTRACAUDAL; PERINEURAL ONCE
Status: DISCONTINUED | OUTPATIENT
Start: 2017-12-27 | End: 2017-12-27 | Stop reason: HOSPADM

## 2017-12-27 RX ADMIN — LIDOCAINE HYDROCHLORIDE 100 MG: 20 INJECTION, SOLUTION INTRAVENOUS at 11:12

## 2017-12-27 RX ADMIN — PROPOFOL 50 MG: 10 INJECTION, EMULSION INTRAVENOUS at 11:12

## 2017-12-27 RX ADMIN — SODIUM CHLORIDE: 0.9 INJECTION, SOLUTION INTRAVENOUS at 10:12

## 2017-12-27 RX ADMIN — PROPOFOL 30 MG: 10 INJECTION, EMULSION INTRAVENOUS at 11:12

## 2017-12-27 RX ADMIN — PROPOFOL 100 MG: 10 INJECTION, EMULSION INTRAVENOUS at 11:12

## 2017-12-27 NOTE — TRANSFER OF CARE
"Anesthesia Transfer of Care Note    Patient: Tr Chen    Procedure(s) Performed: Procedure(s) (LRB):  COLONOSCOPY (N/A)    Patient location: GI    Anesthesia Type: general    Transport from OR: Transported from OR on room air with adequate spontaneous ventilation    Post pain: adequate analgesia    Post assessment: no apparent anesthetic complications and tolerated procedure well    Post vital signs: stable    Level of consciousness: sedated and responds to stimulation    Nausea/Vomiting: no nausea/vomiting    Complications: none    Transfer of care protocol was followed      Last vitals:   Visit Vitals  /78 (BP Location: Left arm, Patient Position: Lying)   Pulse 86   Temp 36.7 °C (98.1 °F) (Oral)   Resp 18   Ht 6' 3" (1.905 m)   Wt 86.2 kg (190 lb)   SpO2 97%   BMI 23.75 kg/m²     "

## 2017-12-27 NOTE — ANESTHESIA POSTPROCEDURE EVALUATION
"Anesthesia Post Evaluation    Patient: Tr Chen    Procedure(s) Performed: Procedure(s) (LRB):  COLONOSCOPY (N/A)    Final Anesthesia Type: general  Patient location during evaluation: GI PACU  Patient participation: Yes- Able to Participate  Level of consciousness: awake and alert and oriented  Post-procedure vital signs: reviewed and stable  Pain management: adequate  Airway patency: patent  PONV status at discharge: No PONV  Anesthetic complications: no      Cardiovascular status: blood pressure returned to baseline and hemodynamically stable  Respiratory status: unassisted, spontaneous ventilation and room air  Hydration status: euvolemic  Follow-up not needed.        Visit Vitals  /78 (BP Location: Left arm, Patient Position: Lying)   Pulse 86   Temp 36.7 °C (98.1 °F) (Oral)   Resp 18   Ht 6' 3" (1.905 m)   Wt 86.2 kg (190 lb)   SpO2 97%   BMI 23.75 kg/m²       Pain/Anuradha Score: Pain Assessment Performed: Yes (12/27/2017 11:52 AM)  Presence of Pain: denies (12/27/2017 11:52 AM)  Anuradha Score: 10 (12/27/2017 11:52 AM)      "

## 2017-12-27 NOTE — DISCHARGE INSTRUCTIONS
Hemorrhoids    Hemorrhoids are swollen and inflamed veins inside the rectum and near the anus. The rectum is the last several inches of the colon. The anus is the passage between the rectum and the outside of the body.  Causes  The veins can become swollen due to increased pressure in them. This is most often caused by:  · Chronic constipation or diarrhea  · Straining when having a bowel movement  · Sitting too long on the toilet  · A low-fiber diet  · Pregnancy  Symptoms  · Bleeding from the rectum (this may be noticeable after bowel movements)  · Lump near the anus  · Itching around the anus  · Pain around the anus  There are different types of hemorrhoids. Depending on the type you have and the severity, you may be able to treat yourself at home. In some cases, a procedure may be the best treatment option. Your healthcare provider can tell you more about this, if needed.  Home care  General care  · To get relief from pain or itching, try:  ¨ Topical products. Your healthcare provider may prescribe or recommend creams, ointments, or pads that can be applied to the hemorrhoid. Use these exactly as directed.  ¨ Medicines. Your healthcare provider may recommend stool softeners, suppositories, or laxatives to help manage constipation. Use these exactly as directed.  ¨ Sitz baths. A sitz bath involves sitting in a few inches of warm bath water. Be careful not to make the water so hot that you burn yourself--test it before sitting in it. Soak for about 10 to 15 minutes a few times a day. This may help relieve pain.  Tips to help prevent hemorrhoids  · Eat more fiber. Fiber adds bulk to stool and absorbs water as it moves through your colon. This makes stool softer and easier to pass.  ¨ Increase the fiber in your diet with more fiber-rich foods. These include fresh fruit, vegetables, and whole grains.  ¨ Take a fiber supplement or bulking agent, if advised to by your provider. These include products such as psyllium  or methylcellulose.  · Drink plenty of water, if directed to by your provider. This can help keep stool soft.  · Be more active. Frequent exercise aids digestion and helps prevent constipation. It may also help make bowel movements more regular.  · Dont strain during bowel movements. This can make hemorrhoids more likely. Also, dont sit on the toilet for long periods of time.  Follow-up care  Follow up with your healthcare provider, or as advised. If a culture or imaging tests were done, you will be notified of the results when they are ready. This may take a few days or longer.  When to seek medical advice  Call your healthcare provider right away if any of these occur:  · Increased bleeding from the rectum  · Increased pain around the rectum or anus  · Weakness or dizziness  Call 911  Call 911 or return to the emergency department right away if any of these occur:  · Trouble breathing or swallowing  · Fainting or loss of consciousness  · Unusually fast heart rate  · Vomiting blood  · Large amounts of blood in stool  Date Last Reviewed: 6/22/2015 © 2000-2017 [employer]. [address] [place] [address]. All rights reserved. This information is not intended as a substitute for professional medical care. Always follow your healthcare professional's instructions.        Understanding Colon and Rectal Polyps    The colon (also called the large intestine) is a muscular tube that forms the last part of the digestive tract. It absorbs water and stores food waste. The colon is about 4 to 6 feet long. The rectum is the last 6 inches of the colon. The colon and rectum have a smooth lining composed of millions of cells. Changes in these cells can lead to growths in the colon that can become cancerous and should be removed. Multiple tests are available to screen for colon cancer, but the colonoscopy is the most recommended test. During colonoscopy, these polyps can be removed. How often you need this  test depends on many things including your condition, your family history, symptoms, and what the findings were at the previous colonoscopy.   When the colon lining changes  Changes that happen in the cells that line the colon or rectum can lead to growths called polyps. Over a period of years, polyps can turn cancerous. Removing polyps early may prevent cancer from ever forming.  Polyps  Polyps are fleshy clumps of tissue that form on the lining of the colon or rectum. Small polyps are usually benign (not cancerous). However, over time, cells in a polyp can change and become cancerous. Certain types of polyps known as adenomatous polyps are premalignant. The risk for invasive cancer increases with the size of the polyp and certain cell and gene features. This means that they can become cancerous if they're not removed. Hyperplastic polyps are benign. They can grow quite large and not turn cancerous.   Cancer  Almost all colorectal cancers start when polyp cells begin growing abnormally. As a cancerous tumor grows, it may involve more and more of the colon or rectum. In time, cancer can also grow beyond the colon or rectum and spread to nearby organs or to glands called lymph nodes. The cells can also travel to other parts of the body. This is known as metastasis. The earlier a cancerous tumor is removed, the better the chance of preventing its spread.    Date Last Reviewed: 8/1/2016  © 1612-2492 [employer]. [address] [place] [address], [place] [address]. All rights reserved. This information is not intended as a substitute for professional medical care. Always follow your healthcare professional's instructions.

## 2017-12-27 NOTE — ANESTHESIA PREPROCEDURE EVALUATION
12/27/2017  Tr Chen is a 62 y.o., male.  There were no vitals taken for this visit.  Wt Readings from Last 3 Encounters:   12/27/17 86.2 kg (190 lb)   11/28/17 88.2 kg (194 lb 7.1 oz)   11/01/17 86.4 kg (190 lb 7.6 oz)     BP Readings from Last 3 Encounters:   12/27/17 120/80   11/28/17 (!) 156/86   11/01/17 138/80     Review of patient's allergies indicates:   Allergen Reactions    No known allergies      Active Ambulatory Problems     Diagnosis Date Noted    History of colonic polyps 07/16/2012    HTN (hypertension) 07/16/2012    Chronic pain 11/20/2012    Screening for prostate cancer 01/17/2014    Erectile dysfunction 05/16/2014    Tobacco use disorder 01/19/2015    Rectal carcinoma 01/26/2017    Liver lesion 02/03/2017    Anorgasmia of male 05/01/2017    Cancer related pain 11/01/2017    History of chemotherapy 11/28/2017     Resolved Ambulatory Problems     Diagnosis Date Noted    Routine medical exam 01/17/2014    Screen for colon cancer 12/27/2017     Past Medical History:   Diagnosis Date    Chronic pain 11/20/2012    Erectile dysfunction 5/16/2014    History of colonic polyps 7/16/2012    HTN (hypertension) 7/16/2012    Hypertension     Tobacco use disorder 1/19/2015     Past Surgical History:   Procedure Laterality Date    COLON SURGERY  12/27/2016    colon resection    COLONOSCOPY N/A 11/21/2016    Procedure: COLONOSCOPY;  Surgeon: Jono Resendez MD;  Location: [facility];  Service: Endoscopy;  Laterality: N/A;  needs scope done 2/24/16  -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     TONSILLECTOMY       No current facility-administered medications for this visit.   No current outpatient prescriptions on file.    Facility-Administered Medications Ordered in Other Visits:     0.9%  NaCl infusion, , Intravenous, Continuous, Jeffrey Perez MD, Last Rate: 10 mL/hr at 12/27/17  1004    lidocaine  20 mg/mL (2%) 20 mg/mL (2 %) injection, , , ,     lidocaine (cardiac) injection, , Intravenous, PRN, Juan Alberto Brown, CRNA, 100 mg at 12/27/17 1119    lidocaine (PF) 10 mg/ml (1%) injection 5 mg, 5 mg, Intradermal, Once, Jeffrey Perez MD    propofol (DIPRIVAN) 10 mg/mL infusion, , Intravenous, PRN, Juan Alberto Brown, CRNA, 50 mg at 12/27/17 1132   Lab Results   Component Value Date    WBC 4.86 11/20/2017    HGB 16.3 11/20/2017    HCT 47.6 11/20/2017    MCV 96 11/20/2017     11/20/2017     Lab Results   Component Value Date    INR 1.0 12/20/2016         Chemistry        Component Value Date/Time     11/20/2017 0748    K 5.0 11/20/2017 0748     11/20/2017 0748    CO2 24 11/20/2017 0748    BUN 8 11/20/2017 0748    CREATININE 1.0 11/20/2017 0748    GLU 98 11/20/2017 0748        Component Value Date/Time    CALCIUM 9.4 11/20/2017 0748    ALKPHOS 73 11/20/2017 0748    AST 21 11/20/2017 0748    ALT 18 11/20/2017 0748    BILITOT 0.6 11/20/2017 0748            Pre-op Assessment    I have reviewed the Patient Summary Reports.      I have reviewed the Medications.     Review of Systems  Anesthesia Hx:  No problems with previous Anesthesia  History of prior surgery of interest to airway management or planning:  Denies Personal Hx of Anesthesia complications.   Social:  Smoker    Hematology/Oncology:         -- Cancer in past history: Other (see Oncology comments) surgery and chemotherapy  Oncology Comments: Rectal carcinoma     Cardiovascular:   Exercise tolerance: good Hypertension    Pulmonary:   Denies Asthma.  Denies Shortness of breath.  Denies Recent URI.  Denies Sleep Apnea.    Renal/:  Renal/ Normal     Hepatic/GI:   Denies PUD. Denies GERD. Denies Liver Disease.  Denies Hepatitis.    Neurological:   Chronic Pain Syndrome   Endocrine:  Endocrine Normal        Physical Exam  General:  Well nourished    Airway/Jaw/Neck:  Airway Findings: Mouth Opening: Normal Tongue:  Normal  General Airway Assessment: Adult  Mallampati: II  Improves to II with phonation.  TM Distance: Normal, at least 6 cm      Dental:  Dental Findings: Edentulous   Chest/Lungs:  Chest/Lungs Findings: Normal Respiratory Rate     Heart/Vascular:  Heart Findings: Rate: Normal        Mental Status:  Mental Status Findings: Normal        Anesthesia Plan  Type of Anesthesia, risks & benefits discussed:  Anesthesia Type:  general  Patient's Preference:   Intra-op Monitoring Plan: standard ASA monitors  Intra-op Monitoring Plan Comments:   Post Op Pain Control Plan: multimodal analgesia and IV/PO Opioids PRN  Post Op Pain Control Plan Comments:   Induction:   IV  Beta Blocker:  Patient is not currently on a Beta-Blocker (No further documentation required).       Informed Consent: Patient understands risks and agrees with Anesthesia plan.  Questions answered. Anesthesia consent signed with patient.  ASA Score: 3     Day of Surgery Review of History & Physical:    H&P update referred to the provider.     Anesthesia Plan Notes: npo        Ready For Surgery From Anesthesia Perspective.

## 2018-01-19 ENCOUNTER — LAB VISIT (OUTPATIENT)
Dept: LAB | Facility: HOSPITAL | Age: 63
End: 2018-01-19
Attending: INTERNAL MEDICINE
Payer: COMMERCIAL

## 2018-01-19 DIAGNOSIS — C20 RECTAL CARCINOMA: ICD-10-CM

## 2018-01-19 LAB
ALBUMIN SERPL BCP-MCNC: 3.9 G/DL
ALP SERPL-CCNC: 68 U/L
ALT SERPL W/O P-5'-P-CCNC: 18 U/L
ANION GAP SERPL CALC-SCNC: 8 MMOL/L
AST SERPL-CCNC: 24 U/L
BASOPHILS # BLD AUTO: 0.02 K/UL
BASOPHILS NFR BLD: 0.3 %
BILIRUB SERPL-MCNC: 0.5 MG/DL
BUN SERPL-MCNC: 9 MG/DL
CALCIUM SERPL-MCNC: 9.2 MG/DL
CEA SERPL-MCNC: 7.3 NG/ML
CHLORIDE SERPL-SCNC: 108 MMOL/L
CO2 SERPL-SCNC: 25 MMOL/L
CREAT SERPL-MCNC: 0.9 MG/DL
DIFFERENTIAL METHOD: ABNORMAL
EOSINOPHIL # BLD AUTO: 0.1 K/UL
EOSINOPHIL NFR BLD: 1.9 %
ERYTHROCYTE [DISTWIDTH] IN BLOOD BY AUTOMATED COUNT: 13.4 %
EST. GFR  (AFRICAN AMERICAN): >60 ML/MIN/1.73 M^2
EST. GFR  (NON AFRICAN AMERICAN): >60 ML/MIN/1.73 M^2
FERRITIN SERPL-MCNC: 297 NG/ML
GLUCOSE SERPL-MCNC: 100 MG/DL
HCT VFR BLD AUTO: 44.5 %
HGB BLD-MCNC: 15.1 G/DL
IRON SERPL-MCNC: 68 UG/DL
LYMPHOCYTES # BLD AUTO: 1.2 K/UL
LYMPHOCYTES NFR BLD: 16.9 %
MCH RBC QN AUTO: 33.6 PG
MCHC RBC AUTO-ENTMCNC: 33.9 G/DL
MCV RBC AUTO: 99 FL
MONOCYTES # BLD AUTO: 0.9 K/UL
MONOCYTES NFR BLD: 13.6 %
NEUTROPHILS # BLD AUTO: 4.6 K/UL
NEUTROPHILS NFR BLD: 67.3 %
PLATELET # BLD AUTO: 225 K/UL
PMV BLD AUTO: 8.7 FL
POTASSIUM SERPL-SCNC: 4 MMOL/L
PROT SERPL-MCNC: 7.2 G/DL
RBC # BLD AUTO: 4.5 M/UL
SATURATED IRON: 17 %
SODIUM SERPL-SCNC: 141 MMOL/L
TOTAL IRON BINDING CAPACITY: 395 UG/DL
TRANSFERRIN SERPL-MCNC: 267 MG/DL
WBC # BLD AUTO: 6.82 K/UL

## 2018-01-19 PROCEDURE — 82378 CARCINOEMBRYONIC ANTIGEN: CPT

## 2018-01-19 PROCEDURE — 80053 COMPREHEN METABOLIC PANEL: CPT

## 2018-01-19 PROCEDURE — 36415 COLL VENOUS BLD VENIPUNCTURE: CPT

## 2018-01-19 PROCEDURE — 82728 ASSAY OF FERRITIN: CPT

## 2018-01-19 PROCEDURE — 83540 ASSAY OF IRON: CPT

## 2018-01-19 PROCEDURE — 85025 COMPLETE CBC W/AUTO DIFF WBC: CPT

## 2018-01-30 ENCOUNTER — OFFICE VISIT (OUTPATIENT)
Dept: FAMILY MEDICINE | Facility: CLINIC | Age: 63
End: 2018-01-30
Payer: COMMERCIAL

## 2018-01-30 VITALS
BODY MASS INDEX: 24.39 KG/M2 | TEMPERATURE: 98 F | WEIGHT: 196.19 LBS | HEIGHT: 75 IN | OXYGEN SATURATION: 98 % | SYSTOLIC BLOOD PRESSURE: 136 MMHG | DIASTOLIC BLOOD PRESSURE: 80 MMHG | HEART RATE: 75 BPM

## 2018-01-30 DIAGNOSIS — N52.9 ERECTILE DYSFUNCTION, UNSPECIFIED ERECTILE DYSFUNCTION TYPE: ICD-10-CM

## 2018-01-30 DIAGNOSIS — I10 ESSENTIAL HYPERTENSION: ICD-10-CM

## 2018-01-30 DIAGNOSIS — C20 RECTAL CARCINOMA: ICD-10-CM

## 2018-01-30 DIAGNOSIS — G89.29 OTHER CHRONIC PAIN: Primary | ICD-10-CM

## 2018-01-30 DIAGNOSIS — F17.200 TOBACCO USE DISORDER: ICD-10-CM

## 2018-01-30 DIAGNOSIS — G89.3 CANCER RELATED PAIN: ICD-10-CM

## 2018-01-30 PROCEDURE — 99214 OFFICE O/P EST MOD 30 MIN: CPT | Mod: S$GLB,,, | Performed by: INTERNAL MEDICINE

## 2018-01-30 PROCEDURE — 3008F BODY MASS INDEX DOCD: CPT | Mod: S$GLB,,, | Performed by: INTERNAL MEDICINE

## 2018-01-30 PROCEDURE — 99999 PR PBB SHADOW E&M-EST. PATIENT-LVL III: CPT | Mod: PBBFAC,,, | Performed by: INTERNAL MEDICINE

## 2018-01-30 RX ORDER — SILDENAFIL 100 MG/1
100 TABLET, FILM COATED ORAL DAILY PRN
Qty: 12 TABLET | Refills: 12 | Status: SHIPPED | OUTPATIENT
Start: 2018-01-30 | End: 2018-07-24 | Stop reason: SDUPTHER

## 2018-01-30 RX ORDER — HYDROCODONE BITARTRATE AND ACETAMINOPHEN 5; 325 MG/1; MG/1
1 TABLET ORAL EVERY 6 HOURS PRN
Qty: 120 TABLET | Refills: 0 | Status: SHIPPED | OUTPATIENT
Start: 2018-01-30 | End: 2018-05-16 | Stop reason: SDUPTHER

## 2018-01-30 RX ORDER — TADALAFIL 20 MG/1
20 TABLET ORAL
Qty: 12 TABLET | Refills: 11 | Status: SHIPPED | OUTPATIENT
Start: 2018-01-30 | End: 2019-01-25 | Stop reason: SDUPTHER

## 2018-01-30 RX ORDER — HYDROCODONE BITARTRATE AND ACETAMINOPHEN 5; 325 MG/1; MG/1
1 TABLET ORAL EVERY 6 HOURS PRN
Qty: 120 TABLET | Refills: 0 | Status: SHIPPED | OUTPATIENT
Start: 2018-03-28 | End: 2018-04-27 | Stop reason: SDUPTHER

## 2018-01-30 RX ORDER — HYDROCODONE BITARTRATE AND ACETAMINOPHEN 5; 325 MG/1; MG/1
1 TABLET ORAL EVERY 6 HOURS PRN
Qty: 120 TABLET | Refills: 0 | Status: SHIPPED | OUTPATIENT
Start: 2018-02-28 | End: 2018-05-16 | Stop reason: SDUPTHER

## 2018-01-30 NOTE — PROGRESS NOTES
Chief complaint refills and follow-up from colon cancer    62-year-old white male who finally did have his needed colonoscopy whereupon colon cancer was found.  He recently had a low anterior resection of the surgery notes were reviewed and appears he was doing well.  Since the surgery however he has only been able to have a semierect erection which also led to inability to orgasm.  The last 2-3 times he is attempting to have intercourse however he was unable to get an erection at all and he did use Viagra at least 2 occasions at 100 mg.  No improvement at all. Semi with cialis. Able to finish.   He is under a lot of stress admittedly.  Erectile dysfunction also a source of stress as it is still very important to him to be able to perform sexually.  He never had problems with erectile dysfunction even on the same dosing of hydrocodone previously and did not have any sexual issues prior to the surgery.  We reviewed together the surgery reported does not appear there was any injury and the surgery although in that region did not appear to directly involve the area of the prostate and so forth.  We did discuss that could've been nerve injury and so forth which will recover over time.  He has already discussed with the surgeon who did not feel it was related to the surgery.  He has already discussed with radiation oncology that apparently they will not be radiating in an area that should damage nerves so forth related to erectile function.  We again discussed possibly seeing urology.  He is back to work.  .  He can almost orgasm but just can't get that he thinks it might be in his mind and likely is.  Still good erections with the cialis.   Total time over 25 minutes with over 50% counseling.  Again discussed possibly seeing Dr. Costello in urology.  He does not have time with work to probably get back to radiation urology asked in their opinion on the radiation field and if they suspect it would be plausible to have  significant damage causing erectile dysfunction.  He saw his surgeon today was aware of the problem but not directly telling him that there was any direct nerve damage.    Review of systems: No fevers or chills, no other urinary complaints, no further GI complaints    PAST MEDICAL HISTORY:   1. ED.   2. HTN.   3. Smoker.   4. Allergic rhinitis with exposure to mold   5. Frequent bronchitis   6. Lumbar strain due to work on [employer]   7. Large colon polyp removed before age 50 - over 10-15 -yrs  8.  Chronic pain referable to ankle arthritis   9.  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 One younger brother with no medical problems    Vitals as above  Gen: no distress    Exam otherwise deferred,     Tr was seen today for medication refill.    Diagnoses and all orders for this visit:    Other chronic pain, chronic cancer pain as well as chronic arthritic pain especially being back to work, his use of pain medication allows him to function and he tolerates it well and his use appears to be appropriate.  He is on a verbal contract with me    Cancer related pain    Essential hypertension, chronic and stable    Rectal carcinoma, discussed issues with prior radiation and chemotherapy treatment regarding erectile dysfunction    Tobacco use disorder    Erectile dysfunction, unspecified erectile dysfunction type, continues to have some erectile dysfunction but also some possible sympathetic nerve damage with an inability to climax which we discussed.  He may well need to get the opinion from his radiation oncologist and then eventually see urology    Other orders  -     hydrocodone-acetaminophen 5-325mg (NORCO) 5-325 mg per tablet; Take 1 tablet by mouth every 6 (six) hours as needed for Pain.  -      hydrocodone-acetaminophen 5-325mg (NORCO) 5-325 mg per tablet; Take 1 tablet by mouth every 6 (six) hours as needed for Pain.  -     hydrocodone-acetaminophen 5-325mg (NORCO) 5-325 mg per tablet; Take 1 tablet by mouth every 6 (six) hours as needed for Pain.  -     sildenafil (VIAGRA) 100 MG tablet; Take 1 tablet (100 mg total) by mouth daily as needed for Erectile Dysfunction. half -1 Tablet Oral .  Take 30 min before anticipated need.  -     tadalafil (CIALIS) 20 MG Tab; Take 1 tablet (20 mg total) by mouth every 36 (thirty-six) hours.

## 2018-02-09 ENCOUNTER — OFFICE VISIT (OUTPATIENT)
Dept: HEMATOLOGY/ONCOLOGY | Facility: CLINIC | Age: 63
End: 2018-02-09
Payer: COMMERCIAL

## 2018-02-09 VITALS
TEMPERATURE: 98 F | HEIGHT: 75 IN | OXYGEN SATURATION: 97 % | HEART RATE: 83 BPM | WEIGHT: 193.13 LBS | DIASTOLIC BLOOD PRESSURE: 80 MMHG | SYSTOLIC BLOOD PRESSURE: 142 MMHG | BODY MASS INDEX: 24.01 KG/M2

## 2018-02-09 DIAGNOSIS — D49.0 COLORECTAL NEOPLASM: Primary | ICD-10-CM

## 2018-02-09 DIAGNOSIS — Z92.21 HISTORY OF CHEMOTHERAPY: ICD-10-CM

## 2018-02-09 DIAGNOSIS — Z72.0 TOBACCO ABUSE: ICD-10-CM

## 2018-02-09 PROCEDURE — 99406 BEHAV CHNG SMOKING 3-10 MIN: CPT | Mod: S$GLB,,, | Performed by: INTERNAL MEDICINE

## 2018-02-09 PROCEDURE — 99213 OFFICE O/P EST LOW 20 MIN: CPT | Mod: 25,S$GLB,, | Performed by: INTERNAL MEDICINE

## 2018-02-09 PROCEDURE — 3008F BODY MASS INDEX DOCD: CPT | Mod: S$GLB,,, | Performed by: INTERNAL MEDICINE

## 2018-02-09 PROCEDURE — 99213 OFFICE O/P EST LOW 20 MIN: CPT | Performed by: INTERNAL MEDICINE

## 2018-02-09 PROCEDURE — 99999 PR PBB SHADOW E&M-EST. PATIENT-LVL III: CPT | Mod: PBBFAC,,, | Performed by: INTERNAL MEDICINE

## 2018-02-09 NOTE — PROGRESS NOTES
Subjective:       Patient ID: Tr Chen is a 62 y.o. male.    Chief Complaint: No chief complaint on file.  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 CT imaging studies negative for evidence of distant disease.  1/27/2017 PET/CT imaging  reveals   hepatic metastatic lesion . He subsequently underwent CT guided liver bx 2/3/2017 - NEG for malignancy .     status post LAR,12/27/2016.pT3 pN1cM0 Stage IIIB  He is s/p adjuvant XELOX cycle 4  Completed 4/17/2017  Followed by chemo/RT initiated  5/22/2017   He completed XRT July 6, 2017  s/p partial completion of XELODA approx 6/21/2017 ( due to ASE)     CT a/p w/contrast 5/17/2017- no disease recurrence, numerous cystic lesions    Today, he is doing well  He is back to work full-time   No Fatigue  No shortness of breath or chest pain  No rectal pain  No loose stool  No N/V/abd pain  No melena, hematochezia or change in bowel habits     CT a/p w/contrast 11/20/2017- no evid of met disease or recurrence    Surveillance colonoscopy 12/27/2017- NEG    Prev Hx: Pt  with past medical history of colonic polyps, hypertension, tobacco abuse, seen today in consultation for recently diagnosed rectosigmoid adenocarcinoma.  The patient reports intermittent rectal bleeding for the past 3 to 4 months.  He subsequently underwent a colonoscopy on 11/21/2016, which revealed polyps noted in the descending colon, sigmoid colon, and rectum and also noted for a malignant-appearing partially obstructing tumor in the distal sigmoid colon with ulceration. Pathology of the rectosigmoid mass revealed a tubulovillous adenoma and polyps benign..The patient reports he underwent a colonoscopy during his 40s for rectal bleeding.  He was diagnosed with a large bleeding polyp, for which he underwent  removal.  He did not undergo surveillance colonoscopies as advised.  No family history of colon cancer.  CT of the abdomen and pelvis on 2016 revealed thickening of the rectal wall, findings, rectosigmoid neoplasm at the rectosigmoid junction and numerous hepatic lesions compatible with hepatic cysts. He  subsequently underwent a rectal sigmoidectomy on 2016 .  Pathology revealed an adenocarcinoma, low grade, clear margins. The patient underwent removal of 24 lymph nodes with 0 lymph nodes involved.  Evidence of tumor deposits noted with pathologic staging pT3, pN1c.      PAST MEDICAL HISTORY:  Erectile dysfunction, colonic polyps, hypertension, tobacco abuse disorder, chronic pain.    PAST SURGICAL HISTORY:  As above.    SOCIAL HISTORY:  He is a former smoker.  He has a greater than 40-pack-year.  He drinks socially.  He is employed as an  for [employer]  .    FAMILY HISTORY:  Mom diagnosed with breast cancer at age 61.  Dad  at age 87 from Alzheimer's.  He has one sibling, brother in overall good health.    Therapy:  s/p Xeloda  1500mg bid Xeloda 2 wks on 1 wk off / Oxaliplatin 130mg/m2 Day 1 q 21 d  X 4 completed 2017     S/p partial completion of concurrent chemo/RT completed       Review of Systems   Constitutional: Negative for appetite change, fatigue, fever and unexpected weight change.   HENT: Negative for mouth sores and nosebleeds.    Eyes: Negative for visual disturbance.   Respiratory: Negative for cough and shortness of breath.    Cardiovascular: Negative for chest pain and leg swelling.   Gastrointestinal: Negative for abdominal pain, blood in stool, constipation, diarrhea, nausea and vomiting.   Genitourinary: Negative for frequency and hematuria.   Musculoskeletal: Negative for arthralgias and back pain.   Skin: Negative for rash.   Neurological: Negative for dizziness, weakness, light-headedness and headaches.   Hematological: Negative  "for adenopathy.       Objective:       Vitals:    02/09/18 0806   BP: (!) 142/80   BP Location: Left arm   Patient Position: Sitting   BP Method: Medium (Manual)   Pulse: 83   Temp: 98.1 °F (36.7 °C)   TempSrc: Oral   SpO2: 97%   Weight: 87.6 kg (193 lb 2 oz)   Height: 6' 3" (1.905 m)       Physical Exam   Constitutional: He is oriented to person, place, and time. He appears well-developed and well-nourished.   HENT:   Head: Normocephalic.   Mouth/Throat: Oropharynx is clear and moist. No oropharyngeal exudate.   Eyes: Conjunctivae are normal. No scleral icterus.   Neck: Normal range of motion. Neck supple. No thyromegaly present.   Cardiovascular: Normal rate, regular rhythm and normal heart sounds.    No murmur heard.  Pulmonary/Chest: Effort normal and breath sounds normal. He has no wheezes. He has no rales.   Abdominal: Soft. Bowel sounds are normal. He exhibits no distension and no mass. There is no hepatosplenomegaly. There is no tenderness. There is no rebound and no guarding.   Musculoskeletal: Normal range of motion. He exhibits no edema.   Lymphadenopathy:     He has no cervical adenopathy.     He has no axillary adenopathy.        Right: No supraclavicular adenopathy present.        Left: No supraclavicular adenopathy present.   Neurological: He is alert and oriented to person, place, and time. No cranial nerve deficit.   Skin: No rash noted. No erythema.   Psychiatric: He has a normal mood and affect.       1/27/2017 PET/CT reveals   hepatic metastatic lesion      LIVER BIOPSIES, CORE BIOPSIES-BENIGN HEPATIC PARENCHYMA. THERE IS MILD MACRO VACUOLAR FATTY CHANGE AND MILD NONSPECIFIC FOCAL CHRONIC TRIADITIS. THERE IS NO EVIDENCE OF TUMOR OR GRANULOMATA PRESENT. A FOCAL AREA OF NONSPECIFIC FIBROSIS SUGGESTIVE OF SCAR TISSUE IS NOTED. THERE IS NO EVIDENCE OF MALIGNANCY.        Results for BRANDY OLIVA (MRN [id number]) as of 9/28/2017 09:17   Ref. Range 3/24/2017 10:24 4/11/2017 11:45 5/5/2017 09:22 " 6/8/2017 11:07 9/22/2017 11:00   CEA Latest Ref Range: 0.0 - 5.0 ng/mL 8.3 (H) 8.9 (H) 8.2 (H) 6.8 (H) 6.3 (H)     Results for TR CHEN (MRN [id number]) as of 11/28/2017 09:24   Ref. Range 9/22/2017 11:00 11/20/2017 07:48   CEA Latest Ref Range: 0.0 - 5.0 ng/mL 6.3 (H) 7.1 (H)       CT a/p w/contrast 11/20/2017   1.  Status post sigmoid resection, no recurrent or metastatic disease.    2.  Centrilobular emphysema lungs.    3.  Liver cysts benign and stable.    4.  Nonobstructed left inferior gallstone.      Results for TR CHEN (MRN [id number]) as of 2/9/2018 08:43   Ref. Range 11/20/2017 07:48 1/19/2018 11:10   CEA Latest Ref Range: 0.0 - 5.0 ng/mL 7.1 (H) 7.3 (H)       Assessment:       1. Colorectal neoplasm    2. History of chemotherapy    3. 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    CT imaging  - negative for evidence of distant disease.     PET/CT imaging studies - hepatic lesion  S/p liver bx - benign   status post LAR,12/27/2016.pT3 pN1cM0 Stage IIIB  He is s/p adjuvant XELOX cycle 4  Completed 4/17/2017  Followed by chemo/RT initiated  5/22/2017   He completed XRT July 6, 2017  s/p partial completion of XELODA approx 6/21/2017 ( due to ASE)    Follow-up CT a/p 11/20/2017 - NEG for met disease  Surveillance colonoscopy 12/27/2017- NEG  Plan f/u imaging prior to f/u     Follow-up with Surg     F/u  3  month with  CBC, CMP,CEA f/u imaging studies prior to f/u     CC: Darrick Jay M.D.        Tobacco Use/Cessation:  I assessed Tr Chen and discussed smoking cessation with him for 3-10 minutes. He  History   Smoking Status    Current Every Day Smoker    Packs/day: 0.50    Years: 30.00    Types: Cigarettes   Smokeless Tobacco    Never Used

## 2018-04-27 RX ORDER — HYDROCODONE BITARTRATE AND ACETAMINOPHEN 5; 325 MG/1; MG/1
1 TABLET ORAL EVERY 6 HOURS PRN
Qty: 120 TABLET | Refills: 0 | Status: SHIPPED | OUTPATIENT
Start: 2018-04-27 | End: 2018-05-16 | Stop reason: SDUPTHER

## 2018-04-27 NOTE — TELEPHONE ENCOUNTER
----- Message from Claudia Latham sent at 4/27/2018  8:09 AM CDT -----  Contact: self  [phone]  Pt has a appt on 5-15-18 (that was the next available ) , he needs refill on hydrcocodone. . Pls call pt [phone] when ready for . Thanks.....Amrita

## 2018-05-04 ENCOUNTER — TELEPHONE (OUTPATIENT)
Dept: HEMATOLOGY/ONCOLOGY | Facility: CLINIC | Age: 63
End: 2018-05-04

## 2018-05-14 ENCOUNTER — TELEPHONE (OUTPATIENT)
Dept: NEUROLOGY | Facility: CLINIC | Age: 63
End: 2018-05-14

## 2018-05-14 NOTE — TELEPHONE ENCOUNTER
Called pt to reschedule appointment. Pt has never seen Dr Cotter and reports not having any headaches or pains. Appointment was cancelled. Pt verbalized understanding.

## 2018-05-16 ENCOUNTER — OFFICE VISIT (OUTPATIENT)
Dept: FAMILY MEDICINE | Facility: CLINIC | Age: 63
End: 2018-05-16
Payer: COMMERCIAL

## 2018-05-16 VITALS
DIASTOLIC BLOOD PRESSURE: 80 MMHG | HEIGHT: 75 IN | WEIGHT: 192.69 LBS | SYSTOLIC BLOOD PRESSURE: 124 MMHG | BODY MASS INDEX: 23.96 KG/M2 | TEMPERATURE: 98 F | OXYGEN SATURATION: 98 % | HEART RATE: 78 BPM

## 2018-05-16 DIAGNOSIS — N52.9 ERECTILE DYSFUNCTION, UNSPECIFIED ERECTILE DYSFUNCTION TYPE: ICD-10-CM

## 2018-05-16 DIAGNOSIS — G89.3 CANCER RELATED PAIN: ICD-10-CM

## 2018-05-16 DIAGNOSIS — G89.29 OTHER CHRONIC PAIN: Primary | ICD-10-CM

## 2018-05-16 DIAGNOSIS — I10 ESSENTIAL HYPERTENSION: ICD-10-CM

## 2018-05-16 PROCEDURE — 99214 OFFICE O/P EST MOD 30 MIN: CPT | Mod: S$GLB,,, | Performed by: INTERNAL MEDICINE

## 2018-05-16 PROCEDURE — 3008F BODY MASS INDEX DOCD: CPT | Mod: CPTII,S$GLB,, | Performed by: INTERNAL MEDICINE

## 2018-05-16 PROCEDURE — 3079F DIAST BP 80-89 MM HG: CPT | Mod: CPTII,S$GLB,, | Performed by: INTERNAL MEDICINE

## 2018-05-16 PROCEDURE — 99999 PR PBB SHADOW E&M-EST. PATIENT-LVL III: CPT | Mod: PBBFAC,,, | Performed by: INTERNAL MEDICINE

## 2018-05-16 PROCEDURE — 3074F SYST BP LT 130 MM HG: CPT | Mod: CPTII,S$GLB,, | Performed by: INTERNAL MEDICINE

## 2018-05-16 RX ORDER — HYDROCODONE BITARTRATE AND ACETAMINOPHEN 5; 325 MG/1; MG/1
1 TABLET ORAL EVERY 6 HOURS PRN
Qty: 120 TABLET | Refills: 0 | Status: SHIPPED | OUTPATIENT
Start: 2018-06-16 | End: 2018-06-15 | Stop reason: SDUPTHER

## 2018-05-16 RX ORDER — HYDROCODONE BITARTRATE AND ACETAMINOPHEN 5; 325 MG/1; MG/1
1 TABLET ORAL EVERY 6 HOURS PRN
Qty: 120 TABLET | Refills: 0 | Status: SHIPPED | OUTPATIENT
Start: 2018-05-16 | End: 2018-06-15 | Stop reason: SDUPTHER

## 2018-05-16 RX ORDER — TADALAFIL 5 MG/1
5 TABLET ORAL DAILY PRN
Qty: 30 TABLET | Refills: 12 | Status: SHIPPED | OUTPATIENT
Start: 2018-05-16 | End: 2018-06-13 | Stop reason: SDUPTHER

## 2018-05-16 RX ORDER — HYDROCODONE BITARTRATE AND ACETAMINOPHEN 5; 325 MG/1; MG/1
1 TABLET ORAL EVERY 6 HOURS PRN
Qty: 120 TABLET | Refills: 0 | Status: SHIPPED | OUTPATIENT
Start: 2018-07-16 | End: 2018-08-16 | Stop reason: SDUPTHER

## 2018-05-16 NOTE — PROGRESS NOTES
Chief complaint refills and follow-up from colon cancer    62-year-old white male who finally did have his needed colonoscopy whereupon colon cancer was found.  He recently had a low anterior resection of the surgery notes were reviewed and appears he was doing well.  Since the surgery however he has only been able to have a semierect erection which also led to inability to orgasm.  Still with his partial erectile dysfunction and partially response to Viagra 100 which is covered by his insurance as his Cialis.  We discussed possibly trying the 5 mg every day Cialis.  No improvement at all. Semi with cialis. Able to finish.   He is under a lot of stress admittedly.  Erectile dysfunction also a source of stress as it is still very important to him to be able to perform sexually.  He never had problems with erectile dysfunction even on the same dosing of hydrocodone previously and did not have any sexual issues prior to the surgery.  We reviewed together the surgery reported does not appear there was any injury and the surgery although in that region did not appear to directly involve the area of the prostate and so forth.  We did discuss that could've been nerve injury and so forth which will recover over time.  He has already discussed with the surgeon who did not feel it was related to the surgery.  He has already discussed with radiation oncology that apparently they will not be radiating in an area that should damage nerves so forth related to erectile function.  We again discussed possibly seeing urology.  He is back to work.  .  He can almost orgasm but just can't get that he thinks it might be in his mind and likely is.      Another issue is occasional diarrhea with certain foods.  We discussed foods and caffeine.  We discussed using a very small dose of Imodium is worse when he has long two-hour car rides.    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  He is using about 2 pain pills per day.  We discussed that his 3 prescriptions therefore should probably last about 6 months       Total time over 25 minutes with over 50% counseling.    Review of systems: No fevers or chills, no other urinary complaints, no further GI complaints    PAST MEDICAL HISTORY:   1. ED.   2. HTN.   3. Smoker.   4. Allergic rhinitis with exposure to mold   5. Frequent bronchitis   6. Lumbar strain due to work on [employer]   7. Large colon polyp removed before age 50 - over 10-15 -yrs  8.  Chronic pain referable to ankle arthritis   9.  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 One younger brother with no medical problems    Vitals as above  Gen: no distress    Exam otherwise deferred,     Tr was seen today for discuss meds.    Diagnoses and all orders for this visit:    Other chronic pain, controlled substance management and monitoring done, expect his pain to increase with his return to work    Cancer related pain, discussed also related issues to the colon removal with diarrhea associated with certain foods    Essential hypertension, chronic and stable    Erectile dysfunction, unspecified erectile dysfunction type, change medication as Cialis daily    Other orders  -     hydrocodone-acetaminophen (NORCO) 5-325 mg per tablet; Take 1 tablet by mouth every 6 (six) hours as needed for Pain.  -     hydrocodone-acetaminophen (NORCO) 5-325 mg per tablet; Take 1 tablet by mouth every 6 (six) hours as needed for Pain.  -     hydrocodone-acetaminophen (NORCO) 5-325 mg per tablet; Take 1 tablet by mouth every 6 (six) hours as needed for Pain.  -     tadalafil (CIALIS) 5 MG tablet; Take 1 tablet (5 mg total) by mouth daily as  needed for Erectile Dysfunction.

## 2018-05-31 RX ORDER — SILDENAFIL 100 MG/1
TABLET, FILM COATED ORAL
Qty: 12 TABLET | Refills: 0 | Status: SHIPPED | OUTPATIENT
Start: 2018-05-31 | End: 2018-06-15 | Stop reason: SDUPTHER

## 2018-06-12 NOTE — TELEPHONE ENCOUNTER
[facility] stated Viagra is not covered by patient insurance the alternative is Sildenafil Citrate please advise.

## 2018-06-13 RX ORDER — SILDENAFIL CITRATE 20 MG/1
TABLET ORAL
Qty: 30 TABLET | Refills: 11 | Status: CANCELLED | OUTPATIENT
Start: 2018-06-13

## 2018-06-13 NOTE — TELEPHONE ENCOUNTER
Patient notified of your recommendation. He stated [facility] informed him he was approved for 5 pills so he agreed to that. Patient said thank you for the information he will open an account at [facility] for the next refill. He is also requesting a refill on Cialis please advise.

## 2018-06-13 NOTE — TELEPHONE ENCOUNTER
No problem but explained to patient that the generic 20 mg Viagra is only covered at certain pharmacy such as [facility] pharmacy, [facility] .  The big pharmacies like [facility] will try to put it on the insurance and they may not sell it for one dollar per pill.  The other places do.    As patient if there is such a pharmacy near where he lives    [facility] on [address]?  Looks like he used the [facility] on [address]??

## 2018-06-14 ENCOUNTER — HOSPITAL ENCOUNTER (OUTPATIENT)
Dept: RADIOLOGY | Facility: HOSPITAL | Age: 63
Discharge: HOME | End: 2018-06-14
Attending: INTERNAL MEDICINE
Payer: COMMERCIAL

## 2018-06-14 DIAGNOSIS — D49.0 COLORECTAL NEOPLASM: ICD-10-CM

## 2018-06-14 PROCEDURE — 25500020 PHARM REV CODE 255: Performed by: INTERNAL MEDICINE

## 2018-06-14 PROCEDURE — 71260 CT THORAX DX C+: CPT | Mod: TC

## 2018-06-14 PROCEDURE — 74177 CT ABD & PELVIS W/CONTRAST: CPT | Mod: 26,,, | Performed by: RADIOLOGY

## 2018-06-14 PROCEDURE — 74177 CT ABD & PELVIS W/CONTRAST: CPT | Mod: TC

## 2018-06-14 PROCEDURE — 71260 CT THORAX DX C+: CPT | Mod: 26,,, | Performed by: RADIOLOGY

## 2018-06-14 RX ORDER — TADALAFIL 5 MG/1
5 TABLET ORAL DAILY PRN
Qty: 30 TABLET | Refills: 12 | Status: SHIPPED | OUTPATIENT
Start: 2018-06-14 | End: 2018-09-14 | Stop reason: DRUGHIGH

## 2018-06-14 RX ADMIN — IOHEXOL 100 ML: 350 INJECTION, SOLUTION INTRAVENOUS at 09:06

## 2018-06-14 RX ADMIN — IOHEXOL 30 ML: 300 INJECTION, SOLUTION INTRAVENOUS at 09:06

## 2018-06-15 ENCOUNTER — OFFICE VISIT (OUTPATIENT)
Dept: HEMATOLOGY/ONCOLOGY | Facility: CLINIC | Age: 63
End: 2018-06-15
Payer: COMMERCIAL

## 2018-06-15 VITALS
DIASTOLIC BLOOD PRESSURE: 82 MMHG | HEART RATE: 75 BPM | BODY MASS INDEX: 23.93 KG/M2 | TEMPERATURE: 99 F | OXYGEN SATURATION: 97 % | HEIGHT: 75 IN | WEIGHT: 192.44 LBS | SYSTOLIC BLOOD PRESSURE: 128 MMHG

## 2018-06-15 DIAGNOSIS — Z92.21 HISTORY OF CHEMOTHERAPY: ICD-10-CM

## 2018-06-15 DIAGNOSIS — R93.5 ABNORMAL CT OF THE ABDOMEN: ICD-10-CM

## 2018-06-15 DIAGNOSIS — C20 RECTAL CARCINOMA: Primary | ICD-10-CM

## 2018-06-15 DIAGNOSIS — Z72.0 TOBACCO ABUSE: ICD-10-CM

## 2018-06-15 PROCEDURE — 99406 BEHAV CHNG SMOKING 3-10 MIN: CPT | Mod: S$GLB,,, | Performed by: INTERNAL MEDICINE

## 2018-06-15 PROCEDURE — 99999 PR PBB SHADOW E&M-EST. PATIENT-LVL IV: CPT | Mod: PBBFAC,,, | Performed by: INTERNAL MEDICINE

## 2018-06-15 PROCEDURE — 3074F SYST BP LT 130 MM HG: CPT | Mod: CPTII,S$GLB,, | Performed by: INTERNAL MEDICINE

## 2018-06-15 PROCEDURE — 3079F DIAST BP 80-89 MM HG: CPT | Mod: CPTII,S$GLB,, | Performed by: INTERNAL MEDICINE

## 2018-06-15 PROCEDURE — 99213 OFFICE O/P EST LOW 20 MIN: CPT | Mod: 25,S$GLB,, | Performed by: INTERNAL MEDICINE

## 2018-06-15 PROCEDURE — 3008F BODY MASS INDEX DOCD: CPT | Mod: CPTII,S$GLB,, | Performed by: INTERNAL MEDICINE

## 2018-06-15 NOTE — PROGRESS NOTES
Subjective:       Patient ID: Tr Chen is a 63 y.o. male.    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 CT imaging studies negative for evidence of distant disease.  1/27/2017 PET/CT imaging  reveals   hepatic metastatic lesion . He subsequently underwent CT guided liver bx 2/3/2017 - NEG for malignancy .     status post LAR,12/27/2016.pT3 pN1cM0 Stage IIIB  He is s/p adjuvant XELOX cycle 4  Completed 4/17/2017  Followed by chemo/RT initiated  5/22/2017   He completed XRT July 6, 2017  s/p partial completion of XELODA approx 6/21/2017 ( due to ASE)       Today, he is doing well  He continues to  work full-time   No Fatigue  No loose stools  Appetite and weight stable   No shortness of breath or chest pain  No N/V  No abd pain   No melena, hematochezia or change in bowel habits   He continues to smoke     CT a/p w/contrast 11/20/2017- no evid of met disease or recurrence    Surveillance colonoscopy 12/27/2017- NEG      Recent CT c/a/p w/contrast 6/11/2018 reveals  interval enlargement of a low-attenuation region in the region of the previously seen metastasis from PET scan of January 2017.  Repeat PET-CT for re-evaluation is recommended.        Prev Hx: Pt  with past medical history of colonic polyps, hypertension, tobacco abuse, seen today in consultation for recently diagnosed rectosigmoid adenocarcinoma.  The patient reports intermittent rectal bleeding for the past 3 to 4 months.  He subsequently underwent a colonoscopy on 11/21/2016, which revealed polyps noted in the descending colon, sigmoid colon, and rectum and also noted for a malignant-appearing partially obstructing tumor in the distal sigmoid colon with ulceration. Pathology of the rectosigmoid mass revealed a tubulovillous  adenoma and polyps benign..The patient reports he underwent a colonoscopy during his 40s for rectal bleeding.  He was diagnosed with a large bleeding polyp, for which he underwent removal.  He did not undergo surveillance colonoscopies as advised.  No family history of colon cancer.  CT of the abdomen and pelvis on 2016 revealed thickening of the rectal wall, findings, rectosigmoid neoplasm at the rectosigmoid junction and numerous hepatic lesions compatible with hepatic cysts. He  subsequently underwent a rectal sigmoidectomy on 2016 .  Pathology revealed an adenocarcinoma, low grade, clear margins. The patient underwent removal of 24 lymph nodes with 0 lymph nodes involved.  Evidence of tumor deposits noted with pathologic staging pT3, pN1c.      PAST MEDICAL HISTORY:  Erectile dysfunction, colonic polyps, hypertension, tobacco abuse disorder, chronic pain.    PAST SURGICAL HISTORY:  As above.    SOCIAL HISTORY:  He is a former smoker.  He has a greater than 40-pack-year.  He drinks socially.  He is employed as an  for [employer]  .    FAMILY HISTORY:  Mom diagnosed with breast cancer at age 61.  Dad  at age 87 from Alzheimer's.  He has one sibling, brother in overall good health.    Therapy:  s/p Xeloda  1500mg bid Xeloda 2 wks on 1 wk off / Oxaliplatin 130mg/m2 Day 1 q 21 d  X 4 completed 2017     S/p partial completion of concurrent chemo/RT completed       Review of Systems   Constitutional: Negative for appetite change, fatigue, fever and unexpected weight change.   HENT: Negative for mouth sores and nosebleeds.    Eyes: Negative for visual disturbance.   Respiratory: Negative for cough and shortness of breath.    Cardiovascular: Negative for chest pain and leg swelling.   Gastrointestinal: Negative for abdominal pain, blood in stool, constipation, diarrhea, nausea and vomiting.   Genitourinary: Negative for frequency and hematuria.  "  Musculoskeletal: Negative for arthralgias and back pain.   Skin: Negative for rash.   Neurological: Negative for dizziness, weakness, light-headedness and headaches.   Hematological: Negative for adenopathy.       Objective:       Vitals:    06/15/18 0900 06/15/18 0902 06/15/18 0903   BP: (!) 163/76 (!) 157/76 128/82   BP Location: Right arm Left arm Right arm   Patient Position: Sitting Sitting Sitting   BP Method: Large (Automatic) Large (Automatic) Large (Manual)   Pulse: 75     Temp: 98.7 °F (37.1 °C)     TempSrc: Oral     SpO2: 97%     Weight: 87.3 kg (192 lb 7.4 oz)     Height: 6' 3" (1.905 m)         Physical Exam   Constitutional: He is oriented to person, place, and time. He appears well-developed and well-nourished.   HENT:   Head: Normocephalic.   Mouth/Throat: Oropharynx is clear and moist. No oropharyngeal exudate.   Eyes: Conjunctivae are normal. No scleral icterus.   Neck: Normal range of motion. Neck supple. No thyromegaly present.   Cardiovascular: Normal rate, regular rhythm and normal heart sounds.    No murmur heard.  Pulmonary/Chest: Effort normal and breath sounds normal. He has no wheezes. He has no rales.   Abdominal: Soft. Bowel sounds are normal. He exhibits no distension and no mass. There is no hepatosplenomegaly. There is no tenderness. There is no rebound and no guarding.   Musculoskeletal: Normal range of motion. He exhibits no edema.   Lymphadenopathy:     He has no cervical adenopathy.     He has no axillary adenopathy.        Right: No supraclavicular adenopathy present.        Left: No supraclavicular adenopathy present.   Neurological: He is alert and oriented to person, place, and time. No cranial nerve deficit.   Skin: No rash noted. No erythema.   Psychiatric: He has a normal mood and affect.       1/27/2017 PET/CT reveals   hepatic metastatic lesion      LIVER BIOPSIES, CORE BIOPSIES-BENIGN HEPATIC PARENCHYMA. THERE IS MILD MACRO VACUOLAR FATTY CHANGE AND MILD NONSPECIFIC " FOCAL CHRONIC TRIADITIS. THERE IS NO EVIDENCE OF TUMOR OR GRANULOMATA PRESENT. A FOCAL AREA OF NONSPECIFIC FIBROSIS SUGGESTIVE OF SCAR TISSUE IS NOTED. THERE IS NO EVIDENCE OF MALIGNANCY.        CT a/p w/contrast 11/20/2017   1.  Status post sigmoid resection, no recurrent or metastatic disease.    2.  Centrilobular emphysema lungs.    3.  Liver cysts benign and stable.    4.  Nonobstructed left inferior gallstone.      Component      Latest Ref Rng & Units 6/14/2018 1/19/2018 11/20/2017 9/22/2017   CEA      0.0 - 5.0 ng/mL 7.9 (H) 7.3 (H) 7.1 (H) 6.3 (H)     Component      Latest Ref Rng & Units 6/8/2017   CEA      0.0 - 5.0 ng/mL 6.8 (H)       CT c/a/p w/contrast 6/11/2018  There has been interval enlargement of a low-attenuation region in the region of the previously seen metastasis from PET scan of January 2017.  Repeat PET-CT for re-evaluation is recommended.    Assessment:       1. Rectal carcinoma    2. History of chemotherapy    3. 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    CT imaging  - negative for evidence of distant disease.     PET/CT imaging studies - hepatic lesion  S/p liver bx - benign   status post LAR,12/27/2016.pT3 pN1cM0 Stage IIIB  He is s/p adjuvant XELOX cycle 4  Completed 4/17/2017  Followed by chemo/RT initiated  5/22/2017   He completed XRT July 6, 2017  s/p partial completion of XELODA approx 6/21/2017 ( due to ASE)    Follow-up CT a/p 11/20/2017 - NEG for met disease  Surveillance colonoscopy 12/27/2017- NEG      Recent CT imaging studies reveals  interval enlargement of a low-attenuation region in the region of the previously seen metastasis from PET scan of January 2017.     Plan PET/CT for further evaluation  CEA remains stable yet elevated ( tobacco abuse likely contributing factor)     Pt  elects to delay imaging studies until next month ( work )     F/u  3  month with  CBC, CMP,CEA f/u imaging studies prior to f/u ( or sooner pending f/u imaging results)     CC: Darrick Jay M.D.            Tobacco Use/Cessation:  I assessed Tr Chen and discussed smoking cessation with him for 3-10 minutes. He  History   Smoking Status    Current Every Day Smoker    Packs/day: 0.50    Years: 30.00    Types: Cigarettes   Smokeless Tobacco    Never Used

## 2018-07-24 ENCOUNTER — TELEPHONE (OUTPATIENT)
Dept: FAMILY MEDICINE | Facility: CLINIC | Age: 63
End: 2018-07-24

## 2018-07-24 RX ORDER — SILDENAFIL 100 MG/1
100 TABLET, FILM COATED ORAL DAILY PRN
Qty: 12 TABLET | Refills: 3 | Status: SHIPPED | OUTPATIENT
Start: 2018-07-24 | End: 2019-01-25 | Stop reason: SDUPTHER

## 2018-07-24 NOTE — TELEPHONE ENCOUNTER
----- Message from Maria G Hyman sent at 7/24/2018  1:12 PM CDT -----  Contact: [facility]/[phone]  Hodan called to clarify the directions for patient's prescription:  sildenafil (VIAGRA) 100 MG tablet. Thank you.

## 2018-07-25 ENCOUNTER — TELEPHONE (OUTPATIENT)
Dept: HEMATOLOGY/ONCOLOGY | Facility: CLINIC | Age: 63
End: 2018-07-25

## 2018-07-25 DIAGNOSIS — C18.9 COLON CANCER: Primary | ICD-10-CM

## 2018-07-25 DIAGNOSIS — C19 COLORECTAL CARCINOMA: ICD-10-CM

## 2018-07-26 ENCOUNTER — LAB VISIT (OUTPATIENT)
Dept: LAB | Facility: HOSPITAL | Age: 63
End: 2018-07-26
Attending: INTERNAL MEDICINE
Payer: COMMERCIAL

## 2018-07-26 DIAGNOSIS — C19 COLORECTAL CARCINOMA: ICD-10-CM

## 2018-07-26 LAB
ALBUMIN SERPL BCP-MCNC: 3.9 G/DL
ALP SERPL-CCNC: 78 U/L
ALT SERPL W/O P-5'-P-CCNC: 15 U/L
ANION GAP SERPL CALC-SCNC: 9 MMOL/L
AST SERPL-CCNC: 19 U/L
BASOPHILS # BLD AUTO: 0.02 K/UL
BASOPHILS NFR BLD: 0.3 %
BILIRUB SERPL-MCNC: 0.5 MG/DL
BUN SERPL-MCNC: 9 MG/DL
CALCIUM SERPL-MCNC: 9.2 MG/DL
CEA SERPL-MCNC: 7.2 NG/ML
CHLORIDE SERPL-SCNC: 109 MMOL/L
CO2 SERPL-SCNC: 21 MMOL/L
CREAT SERPL-MCNC: 0.9 MG/DL
DIFFERENTIAL METHOD: ABNORMAL
EOSINOPHIL # BLD AUTO: 0.1 K/UL
EOSINOPHIL NFR BLD: 1.3 %
ERYTHROCYTE [DISTWIDTH] IN BLOOD BY AUTOMATED COUNT: 12.9 %
EST. GFR  (AFRICAN AMERICAN): >60 ML/MIN/1.73 M^2
EST. GFR  (NON AFRICAN AMERICAN): >60 ML/MIN/1.73 M^2
GLUCOSE SERPL-MCNC: 97 MG/DL
HCT VFR BLD AUTO: 42.6 %
HGB BLD-MCNC: 14.6 G/DL
LYMPHOCYTES # BLD AUTO: 1.2 K/UL
LYMPHOCYTES NFR BLD: 20.1 %
MCH RBC QN AUTO: 32.7 PG
MCHC RBC AUTO-ENTMCNC: 34.3 G/DL
MCV RBC AUTO: 95 FL
MONOCYTES # BLD AUTO: 0.6 K/UL
MONOCYTES NFR BLD: 9.9 %
NEUTROPHILS # BLD AUTO: 4.1 K/UL
NEUTROPHILS NFR BLD: 68.1 %
PLATELET # BLD AUTO: 244 K/UL
PMV BLD AUTO: 8.9 FL
POTASSIUM SERPL-SCNC: 4.3 MMOL/L
PROT SERPL-MCNC: 7 G/DL
RBC # BLD AUTO: 4.47 M/UL
SODIUM SERPL-SCNC: 139 MMOL/L
WBC # BLD AUTO: 6.06 K/UL

## 2018-07-26 PROCEDURE — 80053 COMPREHEN METABOLIC PANEL: CPT

## 2018-07-26 PROCEDURE — 82378 CARCINOEMBRYONIC ANTIGEN: CPT

## 2018-07-26 PROCEDURE — 85025 COMPLETE CBC W/AUTO DIFF WBC: CPT

## 2018-07-26 PROCEDURE — 36415 COLL VENOUS BLD VENIPUNCTURE: CPT

## 2018-08-16 ENCOUNTER — HOSPITAL ENCOUNTER (OUTPATIENT)
Dept: RADIOLOGY | Facility: HOSPITAL | Age: 63
Discharge: HOME | End: 2018-08-16
Attending: INTERNAL MEDICINE
Payer: COMMERCIAL

## 2018-08-16 DIAGNOSIS — C20 RECTAL CARCINOMA: ICD-10-CM

## 2018-08-16 DIAGNOSIS — R93.5 ABNORMAL CT OF THE ABDOMEN: ICD-10-CM

## 2018-08-16 PROCEDURE — 78815 PET IMAGE W/CT SKULL-THIGH: CPT | Mod: 26,PI,, | Performed by: RADIOLOGY

## 2018-08-16 PROCEDURE — A9552 F18 FDG: HCPCS

## 2018-08-16 PROCEDURE — 78815 PET IMAGE W/CT SKULL-THIGH: CPT | Mod: TC

## 2018-08-16 NOTE — TELEPHONE ENCOUNTER
----- Message from Daniella Contreras sent at 8/16/2018 10:38 AM CDT -----  Contact: Self/ [phone]  Pt requesting refill on hydrocodone. Says he may not be able to make it to appt on 8/20/18. Please call to advise. Thank you.    [facility] [address] [place] [address] - [address] AT [facility]  [address]  [place] [address]  Phone: [phone] Fax: [phone]

## 2018-08-16 NOTE — TELEPHONE ENCOUNTER
Patient called for a refill for pain medication. Appointment 8/20/18. Stated, he can not make appointment on 8/20/18. Advised that an appointment is necessary to receive pain medication every three months. Requesting a one month supply to go to his pharmacy and he will call us back at a later time to let us know if he will keep appointment or cancel.

## 2018-08-17 RX ORDER — HYDROCODONE BITARTRATE AND ACETAMINOPHEN 5; 325 MG/1; MG/1
1 TABLET ORAL EVERY 6 HOURS PRN
Qty: 120 TABLET | Refills: 0 | Status: SHIPPED | OUTPATIENT
Start: 2018-08-17 | End: 2018-08-20 | Stop reason: SDUPTHER

## 2018-08-20 ENCOUNTER — OFFICE VISIT (OUTPATIENT)
Dept: FAMILY MEDICINE | Facility: CLINIC | Age: 63
End: 2018-08-20
Payer: COMMERCIAL

## 2018-08-20 VITALS
HEIGHT: 75 IN | WEIGHT: 190.06 LBS | SYSTOLIC BLOOD PRESSURE: 128 MMHG | DIASTOLIC BLOOD PRESSURE: 82 MMHG | HEART RATE: 83 BPM | BODY MASS INDEX: 23.63 KG/M2 | TEMPERATURE: 98 F | OXYGEN SATURATION: 97 %

## 2018-08-20 DIAGNOSIS — G89.29 OTHER CHRONIC PAIN: Primary | ICD-10-CM

## 2018-08-20 DIAGNOSIS — C18.9 METASTATIC COLON CANCER TO LIVER: ICD-10-CM

## 2018-08-20 DIAGNOSIS — G89.3 CANCER RELATED PAIN: ICD-10-CM

## 2018-08-20 DIAGNOSIS — I10 ESSENTIAL HYPERTENSION: ICD-10-CM

## 2018-08-20 DIAGNOSIS — C78.7 METASTATIC COLON CANCER TO LIVER: ICD-10-CM

## 2018-08-20 PROCEDURE — 3008F BODY MASS INDEX DOCD: CPT | Mod: CPTII,S$GLB,, | Performed by: INTERNAL MEDICINE

## 2018-08-20 PROCEDURE — 99214 OFFICE O/P EST MOD 30 MIN: CPT | Mod: S$GLB,,, | Performed by: INTERNAL MEDICINE

## 2018-08-20 PROCEDURE — 3079F DIAST BP 80-89 MM HG: CPT | Mod: CPTII,S$GLB,, | Performed by: INTERNAL MEDICINE

## 2018-08-20 PROCEDURE — 99999 PR PBB SHADOW E&M-EST. PATIENT-LVL III: CPT | Mod: PBBFAC,,, | Performed by: INTERNAL MEDICINE

## 2018-08-20 PROCEDURE — 3074F SYST BP LT 130 MM HG: CPT | Mod: CPTII,S$GLB,, | Performed by: INTERNAL MEDICINE

## 2018-08-20 RX ORDER — HYDROCODONE BITARTRATE AND ACETAMINOPHEN 5; 325 MG/1; MG/1
1 TABLET ORAL EVERY 6 HOURS PRN
Qty: 120 TABLET | Refills: 0 | Status: SHIPPED | OUTPATIENT
Start: 2018-10-20 | End: 2018-09-14 | Stop reason: SDUPTHER

## 2018-08-20 RX ORDER — HYDROCODONE BITARTRATE AND ACETAMINOPHEN 5; 325 MG/1; MG/1
1 TABLET ORAL EVERY 6 HOURS PRN
Qty: 120 TABLET | Refills: 0 | Status: SHIPPED | OUTPATIENT
Start: 2018-09-20 | End: 2018-09-14 | Stop reason: SDUPTHER

## 2018-08-20 RX ORDER — HYDROCODONE BITARTRATE AND ACETAMINOPHEN 5; 325 MG/1; MG/1
1 TABLET ORAL EVERY 6 HOURS PRN
Qty: 120 TABLET | Refills: 0 | Status: SHIPPED | OUTPATIENT
Start: 2018-08-20 | End: 2018-10-12 | Stop reason: SDUPTHER

## 2018-08-20 NOTE — PROGRESS NOTES
Chief complaint refills and follow-up from colon cancer    63-year-old white male who finally did have his needed colonoscopy whereupon colon cancer was found.  He recently had a low anterior resection of the surgery notes were reviewed and appears he was doing well.  He is back to work which has caused him to need his pain medication again.  He also asked me to review recent PET scan which did show progression of the metastatic lesion in his liver.  It looks like it grew perhaps 2 cm in size.  Does not appear to be too happy to hear the news has he has felt he has received all the treatment he desires to pursue including chemo and radiation.  I did openly discuss that I am not the best person to tell him all the available treatments for this isolated metastatic lesion but perhaps there is some local treatment that could be done.  Will need to follow up with his of the specialist.  We discussed this at length as well as prescription managementTotal time over 25 minutes with over 50% counseling.     .    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        Review of systems: No fevers or chills, no other urinary complaints, no further GI complaints    PAST MEDICAL HISTORY:   1. ED.   2. HTN.   3. Smoker.   4. Allergic rhinitis with exposure to mold   5. Frequent bronchitis   6. Lumbar strain due to work on [employer]   7. Large colon polyp removed before age 50 - over 10-15 -yrs  8.  Chronic pain referable to ankle arthritis   9.  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 One younger brother with no medical problems    Vitals as above  Gen: no distress    Exam otherwise deferred,     Diagnoses  and all orders for this visit:    Other chronic pain, controlled substance management and monitoring done, some of his pain relates to his arthritic condition in his ankles and his particular work but some of it relates to his prior cancer surgical treatment and radiation treatment    Cancer related pain    Essential hypertension, chronic and stable    Metastatic colon cancer to liver, discussed sensitive issue as above, new finding    Other orders  -     HYDROcodone-acetaminophen (NORCO) 5-325 mg per tablet; Take 1 tablet by mouth every 6 (six) hours as needed for Pain.  -     HYDROcodone-acetaminophen (NORCO) 5-325 mg per tablet; Take 1 tablet by mouth every 6 (six) hours as needed for Pain.  -     HYDROcodone-acetaminophen (NORCO) 5-325 mg per tablet; Take 1 tablet by mouth every 6 (six) hours as needed for Pain.

## 2018-09-12 ENCOUNTER — LAB VISIT (OUTPATIENT)
Dept: LAB | Facility: HOSPITAL | Age: 63
End: 2018-09-12
Attending: INTERNAL MEDICINE
Payer: COMMERCIAL

## 2018-09-12 DIAGNOSIS — C20 RECTAL CARCINOMA: ICD-10-CM

## 2018-09-12 LAB
ALBUMIN SERPL BCP-MCNC: 3.8 G/DL
ALP SERPL-CCNC: 83 U/L
ALT SERPL W/O P-5'-P-CCNC: 9 U/L
ANION GAP SERPL CALC-SCNC: 9 MMOL/L
AST SERPL-CCNC: 16 U/L
BASOPHILS # BLD AUTO: 0.02 K/UL
BASOPHILS NFR BLD: 0.3 %
BILIRUB SERPL-MCNC: 0.4 MG/DL
BUN SERPL-MCNC: 9 MG/DL
CALCIUM SERPL-MCNC: 9.1 MG/DL
CEA SERPL-MCNC: 7.1 NG/ML
CHLORIDE SERPL-SCNC: 108 MMOL/L
CO2 SERPL-SCNC: 23 MMOL/L
CREAT SERPL-MCNC: 1 MG/DL
DIFFERENTIAL METHOD: ABNORMAL
EOSINOPHIL # BLD AUTO: 0.1 K/UL
EOSINOPHIL NFR BLD: 1.5 %
ERYTHROCYTE [DISTWIDTH] IN BLOOD BY AUTOMATED COUNT: 13.2 %
EST. GFR  (AFRICAN AMERICAN): >60 ML/MIN/1.73 M^2
EST. GFR  (NON AFRICAN AMERICAN): >60 ML/MIN/1.73 M^2
FERRITIN SERPL-MCNC: 308 NG/ML
GLUCOSE SERPL-MCNC: 103 MG/DL
HCT VFR BLD AUTO: 44 %
HGB BLD-MCNC: 15 G/DL
IRON SERPL-MCNC: 70 UG/DL
LYMPHOCYTES # BLD AUTO: 1 K/UL
LYMPHOCYTES NFR BLD: 14.2 %
MCH RBC QN AUTO: 32.6 PG
MCHC RBC AUTO-ENTMCNC: 34.1 G/DL
MCV RBC AUTO: 96 FL
MONOCYTES # BLD AUTO: 0.7 K/UL
MONOCYTES NFR BLD: 9.7 %
NEUTROPHILS # BLD AUTO: 5.4 K/UL
NEUTROPHILS NFR BLD: 74.3 %
PLATELET # BLD AUTO: 230 K/UL
PMV BLD AUTO: 9.1 FL
POTASSIUM SERPL-SCNC: 4.4 MMOL/L
PROT SERPL-MCNC: 7.2 G/DL
RBC # BLD AUTO: 4.6 M/UL
SATURATED IRON: 18 %
SODIUM SERPL-SCNC: 140 MMOL/L
TOTAL IRON BINDING CAPACITY: 394 UG/DL
TRANSFERRIN SERPL-MCNC: 266 MG/DL
WBC # BLD AUTO: 7.32 K/UL

## 2018-09-12 PROCEDURE — 83540 ASSAY OF IRON: CPT

## 2018-09-12 PROCEDURE — 85025 COMPLETE CBC W/AUTO DIFF WBC: CPT

## 2018-09-12 PROCEDURE — 82728 ASSAY OF FERRITIN: CPT

## 2018-09-12 PROCEDURE — 82378 CARCINOEMBRYONIC ANTIGEN: CPT

## 2018-09-12 PROCEDURE — 80053 COMPREHEN METABOLIC PANEL: CPT

## 2018-09-12 PROCEDURE — 36415 COLL VENOUS BLD VENIPUNCTURE: CPT

## 2018-09-14 ENCOUNTER — OFFICE VISIT (OUTPATIENT)
Dept: HEMATOLOGY/ONCOLOGY | Facility: CLINIC | Age: 63
End: 2018-09-14
Payer: COMMERCIAL

## 2018-09-14 VITALS
BODY MASS INDEX: 25.1 KG/M2 | SYSTOLIC BLOOD PRESSURE: 118 MMHG | TEMPERATURE: 99 F | WEIGHT: 189.38 LBS | HEIGHT: 73 IN | DIASTOLIC BLOOD PRESSURE: 74 MMHG | HEART RATE: 84 BPM | OXYGEN SATURATION: 96 %

## 2018-09-14 DIAGNOSIS — K76.9 LIVER LESION: ICD-10-CM

## 2018-09-14 DIAGNOSIS — C20 RECTAL CARCINOMA: Primary | ICD-10-CM

## 2018-09-14 DIAGNOSIS — Z92.21 HISTORY OF CHEMOTHERAPY: ICD-10-CM

## 2018-09-14 DIAGNOSIS — Z92.3 HISTORY OF RADIATION THERAPY: ICD-10-CM

## 2018-09-14 PROCEDURE — 3074F SYST BP LT 130 MM HG: CPT | Mod: CPTII,S$GLB,, | Performed by: INTERNAL MEDICINE

## 2018-09-14 PROCEDURE — 99999 PR PBB SHADOW E&M-EST. PATIENT-LVL III: CPT | Mod: PBBFAC,,, | Performed by: INTERNAL MEDICINE

## 2018-09-14 PROCEDURE — 99214 OFFICE O/P EST MOD 30 MIN: CPT | Mod: S$GLB,,, | Performed by: INTERNAL MEDICINE

## 2018-09-14 PROCEDURE — 3078F DIAST BP <80 MM HG: CPT | Mod: CPTII,S$GLB,, | Performed by: INTERNAL MEDICINE

## 2018-09-14 PROCEDURE — 3008F BODY MASS INDEX DOCD: CPT | Mod: CPTII,S$GLB,, | Performed by: INTERNAL MEDICINE

## 2018-09-14 NOTE — PROGRESS NOTES
Subjective:       Patient ID: Tr Chen is a 63 y.o. male.    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 CT imaging studies negative for evidence of distant disease.  1/27/2017 PET/CT imaging  reveals   hepatic metastatic lesion . He subsequently underwent CT guided liver bx 2/3/2017 - NEG for malignancy .     status post LAR,12/27/2016.pT3 pN1cM0 Stage IIIB  He is s/p adjuvant XELOX cycle 4  Completed 4/17/2017  Followed by chemo/RT initiated  5/22/2017   He completed XRT July 6, 2017  s/p partial completion of XELODA approx 6/21/2017 ( due to ASE)       Follow-up postponed per pt preference due to job     Today, he is doing well  He continues to  work full-time   He has occasional loose stools  Appetite and weight stable   No shortness of breath or chest pain  No N/V  No abd pain   No melena, hematochezia   He continues to smoke     Surveillance colonoscopy 12/27/2017- NEG     CT c/a/p w/contrast 6/11/2018 reveals  interval enlargement of a low-attenuation region in the region of the previously seen metastasis from PET scan of     Pt delayed follow-up CT ( work related issues)   PET/CT 8/16/2018 reveals Progression of hepatic metastatic disease         Prev Hx: Pt  with past medical history of colonic polyps, hypertension, tobacco abuse, seen today in consultation for recently diagnosed rectosigmoid adenocarcinoma.  The patient reports intermittent rectal bleeding for the past 3 to 4 months.  He subsequently underwent a colonoscopy on 11/21/2016, which revealed polyps noted in the descending colon, sigmoid colon, and rectum and also noted for a malignant-appearing partially obstructing tumor in the distal sigmoid colon with ulceration. Pathology of the rectosigmoid mass revealed a  tubulovillous adenoma and polyps benign..The patient reports he underwent a colonoscopy during his 40s for rectal bleeding.  He was diagnosed with a large bleeding polyp, for which he underwent removal.  He did not undergo surveillance colonoscopies as advised.  No family history of colon cancer.  CT of the abdomen and pelvis on 2016 revealed thickening of the rectal wall, findings, rectosigmoid neoplasm at the rectosigmoid junction and numerous hepatic lesions compatible with hepatic cysts. He  subsequently underwent a rectal sigmoidectomy on 2016 .  Pathology revealed an adenocarcinoma, low grade, clear margins. The patient underwent removal of 24 lymph nodes with 0 lymph nodes involved.  Evidence of tumor deposits noted with pathologic staging pT3, pN1c.      PAST MEDICAL HISTORY:  Erectile dysfunction, colonic polyps, hypertension, tobacco abuse disorder, chronic pain.    PAST SURGICAL HISTORY:  As above.    SOCIAL HISTORY:  He is a former smoker.  He has a greater than 40-pack-year.  He drinks socially.  He is employed as an  for [employer]  .    FAMILY HISTORY:  Mom diagnosed with breast cancer at age 61.  Dad  at age 87 from Alzheimer's.  He has one sibling, brother in overall good health.    Therapy:  s/p Xeloda  1500mg bid Xeloda 2 wks on 1 wk off / Oxaliplatin 130mg/m2 Day 1 q 21 d  X 4 completed 2017     S/p partial completion of concurrent chemo/RT completed       Review of Systems   Constitutional: Negative for appetite change, fatigue, fever and unexpected weight change.   HENT: Negative for mouth sores and nosebleeds.    Eyes: Negative for visual disturbance.   Respiratory: Negative for cough and shortness of breath.    Cardiovascular: Negative for chest pain and leg swelling.   Gastrointestinal: Negative for abdominal pain, blood in stool, constipation, diarrhea, nausea and vomiting.   Genitourinary: Negative for frequency and hematuria.  "  Musculoskeletal: Negative for arthralgias and back pain.   Skin: Negative for rash.   Neurological: Negative for dizziness, weakness, light-headedness and headaches.   Hematological: Negative for adenopathy.       Objective:       Vitals:    09/14/18 0905   BP: 118/74   BP Location: Left arm   Patient Position: Sitting   BP Method: Medium (Manual)   Pulse: 84   Temp: 98.6 °F (37 °C)   TempSrc: Oral   SpO2: 96%   Weight: 85.9 kg (189 lb 6 oz)   Height: 6' 1" (1.854 m)       Physical Exam   Constitutional: He is oriented to person, place, and time. He appears well-developed and well-nourished.   HENT:   Head: Normocephalic.   Mouth/Throat: Oropharynx is clear and moist. No oropharyngeal exudate.   Eyes: Conjunctivae are normal. No scleral icterus.   Neck: Normal range of motion. Neck supple. No thyromegaly present.   Cardiovascular: Normal rate, regular rhythm and normal heart sounds.   No murmur heard.  Pulmonary/Chest: Effort normal and breath sounds normal. He has no wheezes. He has no rales.   Abdominal: Soft. Bowel sounds are normal. He exhibits no distension and no mass. There is no hepatosplenomegaly. There is no tenderness. There is no rebound and no guarding.   Musculoskeletal: Normal range of motion. He exhibits no edema.   Lymphadenopathy:     He has no cervical adenopathy.     He has no axillary adenopathy.        Right: No supraclavicular adenopathy present.        Left: No supraclavicular adenopathy present.   Neurological: He is alert and oriented to person, place, and time. No cranial nerve deficit.   Skin: No rash noted. No erythema.   Psychiatric: He has a normal mood and affect.       1/27/2017 PET/CT reveals   hepatic metastatic lesion    Pathology 2/03/2017  LIVER BIOPSIES, CORE BIOPSIES-BENIGN HEPATIC PARENCHYMA. THERE IS MILD MACRO VACUOLAR FATTY CHANGE AND MILD NONSPECIFIC FOCAL CHRONIC TRIADITIS. THERE IS NO EVIDENCE OF TUMOR OR GRANULOMATA PRESENT. A FOCAL AREA OF NONSPECIFIC FIBROSIS " SUGGESTIVE OF SCAR TISSUE IS NOTED. THERE IS NO EVIDENCE OF MALIGNANCY.        CT a/p w/contrast 11/20/2017   1.  Status post sigmoid resection, no recurrent or metastatic disease.    2.  Centrilobular emphysema lungs.    3.  Liver cysts benign and stable.    4.  Nonobstructed left inferior gallstone.      Component      Latest Ref Rng & Units 6/14/2018 1/19/2018 11/20/2017 9/22/2017   CEA      0.0 - 5.0 ng/mL 7.9 (H) 7.3 (H) 7.1 (H) 6.3 (H)     Component      Latest Ref Rng & Units 6/8/2017   CEA      0.0 - 5.0 ng/mL 6.8 (H)   Results for BRANDY OLIVA (MRN [id number]) as of 9/14/2018 09:11   Ref. Range 7/26/2018 12:31 9/12/2018 12:01   CEA Latest Ref Range: 0.0 - 5.0 ng/mL 7.2 (H) 7.1 (H)           CT c/a/p w/contrast 6/11/2018  There has been interval enlargement of a low-attenuation region in the region of the previously seen metastasis from PET scan of January 2017.  Repeat PET-CT for re-evaluation is recommended.    PET/CT 8/16/2018 Progression of hepatic metastatic disease in this patient with a history of rectal cancer status post low anterior resection.    Assessment:       1. Rectal carcinoma    2. Liver lesion    3. History of chemotherapy    4. 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    CT imaging  - negative for evidence of distant disease.     PET/CT imaging studies - hepatic lesion  S/p liver bx 2/3/2017  - benign   status post LAR,12/27/2016.pT3 pN1cM0 Stage IIIB  He is s/p adjuvant XELOX cycle 4  Completed 4/17/2017  Followed by chemo/RT initiated  5/22/2017   s/p partial completion of XELODA approx 6/21/2017 ( due to ASE)      Surveillance colonoscopy 12/27/2017- NEG  Follow-up C-scope per GI planned 12/2019   CT c/a/p w/contrast 6/11/2018 reveals  interval enlargement of a low-attenuation region in the  region of the previously seen metastasis from PET scan of   Pt delayed follow-up CT ( work related issues)   PET/CT 8/16/2018 reveals Progression of hepatic metastatic disease     PLAN AMBULATORY REFERRAL TO RADIATION ONCOLOGY FOR EVALUATION FOR LIVER BX    Pt elects to delay imaging studies until next month ( work )     F/u  3  month with  CBC, CMP,CEA f/u imaging studies prior to f/u ( or sooner pending f/u imaging results)     CC: ROSEANN Lai M.D., MD Craig Ehrensing, MD

## 2018-10-12 ENCOUNTER — HOSPITAL ENCOUNTER (OUTPATIENT)
Dept: PREADMISSION TESTING | Facility: HOSPITAL | Age: 63
Discharge: HOME | End: 2018-10-12
Attending: RADIOLOGY
Payer: COMMERCIAL

## 2018-10-12 ENCOUNTER — OFFICE VISIT (OUTPATIENT)
Dept: FAMILY MEDICINE | Facility: CLINIC | Age: 63
End: 2018-10-12
Payer: COMMERCIAL

## 2018-10-12 VITALS
SYSTOLIC BLOOD PRESSURE: 120 MMHG | DIASTOLIC BLOOD PRESSURE: 70 MMHG | WEIGHT: 189.81 LBS | HEIGHT: 75 IN | TEMPERATURE: 98 F | BODY MASS INDEX: 23.6 KG/M2 | HEART RATE: 61 BPM | OXYGEN SATURATION: 98 %

## 2018-10-12 VITALS
RESPIRATION RATE: 18 BRPM | TEMPERATURE: 98 F | DIASTOLIC BLOOD PRESSURE: 86 MMHG | HEART RATE: 59 BPM | WEIGHT: 190 LBS | BODY MASS INDEX: 23.62 KG/M2 | SYSTOLIC BLOOD PRESSURE: 151 MMHG | HEIGHT: 75 IN | OXYGEN SATURATION: 100 %

## 2018-10-12 DIAGNOSIS — M25.571 CHRONIC PAIN OF BOTH ANKLES: ICD-10-CM

## 2018-10-12 DIAGNOSIS — Z01.818 PRE-OP TESTING: ICD-10-CM

## 2018-10-12 DIAGNOSIS — C18.9 METASTATIC COLON CANCER TO LIVER: Primary | ICD-10-CM

## 2018-10-12 DIAGNOSIS — K76.9 LIVER LESION: Primary | ICD-10-CM

## 2018-10-12 DIAGNOSIS — G89.29 OTHER CHRONIC PAIN: ICD-10-CM

## 2018-10-12 DIAGNOSIS — M25.572 CHRONIC PAIN OF BOTH ANKLES: ICD-10-CM

## 2018-10-12 DIAGNOSIS — I10 ESSENTIAL HYPERTENSION: ICD-10-CM

## 2018-10-12 DIAGNOSIS — G89.29 CHRONIC PAIN OF BOTH ANKLES: ICD-10-CM

## 2018-10-12 DIAGNOSIS — G89.3 CANCER RELATED PAIN: ICD-10-CM

## 2018-10-12 DIAGNOSIS — C78.7 METASTATIC COLON CANCER TO LIVER: Primary | ICD-10-CM

## 2018-10-12 LAB
ALBUMIN SERPL BCP-MCNC: 3.9 G/DL
ALP SERPL-CCNC: 74 U/L
ALT SERPL W/O P-5'-P-CCNC: 14 U/L
ANION GAP SERPL CALC-SCNC: 7 MMOL/L
AST SERPL-CCNC: 17 U/L
BASOPHILS # BLD AUTO: 0.02 K/UL
BASOPHILS NFR BLD: 0.4 %
BILIRUB SERPL-MCNC: 0.5 MG/DL
BUN SERPL-MCNC: 12 MG/DL
CALCIUM SERPL-MCNC: 9.5 MG/DL
CEA SERPL-MCNC: 6.9 NG/ML
CHLORIDE SERPL-SCNC: 105 MMOL/L
CO2 SERPL-SCNC: 26 MMOL/L
CREAT SERPL-MCNC: 0.9 MG/DL
DIFFERENTIAL METHOD: ABNORMAL
EOSINOPHIL # BLD AUTO: 0.2 K/UL
EOSINOPHIL NFR BLD: 4.4 %
ERYTHROCYTE [DISTWIDTH] IN BLOOD BY AUTOMATED COUNT: 13.4 %
EST. GFR  (AFRICAN AMERICAN): >60 ML/MIN/1.73 M^2
EST. GFR  (NON AFRICAN AMERICAN): >60 ML/MIN/1.73 M^2
GLUCOSE SERPL-MCNC: 92 MG/DL
HCT VFR BLD AUTO: 43.5 %
HGB BLD-MCNC: 14.8 G/DL
INR PPP: 1
LYMPHOCYTES # BLD AUTO: 0.9 K/UL
LYMPHOCYTES NFR BLD: 17.1 %
MCH RBC QN AUTO: 33 PG
MCHC RBC AUTO-ENTMCNC: 34 G/DL
MCV RBC AUTO: 97 FL
MONOCYTES # BLD AUTO: 0.7 K/UL
MONOCYTES NFR BLD: 13.3 %
NEUTROPHILS # BLD AUTO: 3.4 K/UL
NEUTROPHILS NFR BLD: 64.8 %
PLATELET # BLD AUTO: 216 K/UL
PMV BLD AUTO: 9.1 FL
POTASSIUM SERPL-SCNC: 4.7 MMOL/L
PROT SERPL-MCNC: 7.2 G/DL
PROTHROMBIN TIME: 10.6 SEC
RBC # BLD AUTO: 4.48 M/UL
SODIUM SERPL-SCNC: 138 MMOL/L
WBC # BLD AUTO: 5.19 K/UL

## 2018-10-12 PROCEDURE — 36415 COLL VENOUS BLD VENIPUNCTURE: CPT

## 2018-10-12 PROCEDURE — 82378 CARCINOEMBRYONIC ANTIGEN: CPT

## 2018-10-12 PROCEDURE — 80053 COMPREHEN METABOLIC PANEL: CPT

## 2018-10-12 PROCEDURE — 3074F SYST BP LT 130 MM HG: CPT | Mod: CPTII,S$GLB,, | Performed by: INTERNAL MEDICINE

## 2018-10-12 PROCEDURE — 99999 PR PBB SHADOW E&M-EST. PATIENT-LVL III: CPT | Mod: PBBFAC,,, | Performed by: INTERNAL MEDICINE

## 2018-10-12 PROCEDURE — 3008F BODY MASS INDEX DOCD: CPT | Mod: CPTII,S$GLB,, | Performed by: INTERNAL MEDICINE

## 2018-10-12 PROCEDURE — 85610 PROTHROMBIN TIME: CPT

## 2018-10-12 PROCEDURE — 85025 COMPLETE CBC W/AUTO DIFF WBC: CPT

## 2018-10-12 PROCEDURE — 99214 OFFICE O/P EST MOD 30 MIN: CPT | Mod: S$GLB,,, | Performed by: INTERNAL MEDICINE

## 2018-10-12 PROCEDURE — 3078F DIAST BP <80 MM HG: CPT | Mod: CPTII,S$GLB,, | Performed by: INTERNAL MEDICINE

## 2018-10-12 RX ORDER — HYDROCODONE BITARTRATE AND ACETAMINOPHEN 5; 325 MG/1; MG/1
1 TABLET ORAL EVERY 6 HOURS PRN
Qty: 120 TABLET | Refills: 0 | Status: SHIPPED | OUTPATIENT
Start: 2018-11-12 | End: 2019-01-25 | Stop reason: SDUPTHER

## 2018-10-12 RX ORDER — HYDROCODONE BITARTRATE AND ACETAMINOPHEN 5; 325 MG/1; MG/1
1 TABLET ORAL EVERY 6 HOURS PRN
Qty: 120 TABLET | Refills: 0 | Status: SHIPPED | OUTPATIENT
Start: 2019-01-12 | End: 2018-12-14 | Stop reason: SDUPTHER

## 2018-10-12 RX ORDER — HYDROCODONE BITARTRATE AND ACETAMINOPHEN 5; 325 MG/1; MG/1
1 TABLET ORAL EVERY 6 HOURS PRN
Qty: 120 TABLET | Refills: 0 | Status: SHIPPED | OUTPATIENT
Start: 2018-12-12 | End: 2018-12-14 | Stop reason: SDUPTHER

## 2018-10-12 NOTE — DISCHARGE INSTRUCTIONS
Your procedure is scheduled for___10/19/2018______________.      Report to SAME DAY SURGERY UNIT at __8:00_____am on the 2nd floor of the hospital.  Use the front entrance of the hospital.  The front doors of the hospital open promptly at 5:30 am.    If you need wheelchair assistance, call [phone] from your cell phone,  or call 0 from the courtesy phone in the hospital lobby.    Important instructions:   Do not eat or drink after 12 midnight, including water.  It is okay to brush your teeth.  Do not have gum, candy or mints.     Take only these medications with a small swallow of water on the morning of your surgery.___none__________      Stop taking Aspirin, Ibuprofen, Motrin and Aleve , Fish oil, and Vitamin E for at least 7 days before your surgery. You may use Tylenol unless otherwise instructed by your doctor.           Please shower the night before and the morning of your surgery.        Use Hibiclens soap to your surgery site if instructed by your pre op nurse.   If your surgery is on your abdomen, be sure to wash your naval.  Be sure to rinse off Hibiclens after it is on your skin for several minutes.  Do not use Hibiclens on your face or genitals. Please place clean linens on your bed the night before surgery. Please wear fresh clean clothing after each shower.     No shaving of procedural area at least 4-5 days before surgery due to increased risk of skin irritation and/or possible infection.     You may wear deodorant only.      Do not wear powder, body lotion or cologne.     Do not wear any jewelry or have any metal on your body.     Please bring any documents given to you by your doctor.     If you are going home on the same day of surgery, you must arrange for a family member or a friend to drive you home.  Public transportation is prohibited.    You will not be able to drive home if you were given anesthesia or sedation.     Wear loose fitting clothes allowing for bandages.     Please  leave money and valuables home.       You may bring your cell phone.     Call the doctor if fever or illness should occur before your surgery.    Call [phone] to contact us here at [facility] if needed.

## 2018-10-12 NOTE — PROGRESS NOTES
Chief complaint refills and follow-up from colon cancer, fill out forms    63-year-old white male who finally did have his needed colonoscopy whereupon colon cancer was found.  He recently had a low anterior resection of the surgery notes were reviewed and appears he was doing well.  He is back to work which has caused him to need his pain medication again.  He also asked me to review recent PET scan which did show progression of the metastatic lesion in his liver.  It looks like it grew perhaps 2 cm in size.  Does not appear to be too happy to hear the news has he has felt he has received all the treatment he desires to pursue including chemo and radiation.  I did openly discuss that I am not the best person to tell him all the available treatments for this isolated metastatic lesion but perhaps there is some local treatment that could be done.  He has made follow-ups with his specialist.  Apparently the liver biopsy was inconclusive not showing any malignant cells.  The plan since it is enlarging is to repeat that biopsy.  He does plan to follow through on this as he does need to know.  He is anxious about what treatment he might pursue as he does not want to suffer.  He does have good insight into the fact that the diagnosis will determine what is offered.  Even if he does not strongly wish to pursue radiation treatment and so forth he is willing to at least meet with the radiation oncologist to discuss options as I did discuss with him perhaps there can be some targeted therapy that can minimize side effects.    He does have an extensive FMLA form to complete in great detail.  It is post for the diagnosis of his metastatic colon cancer as well as his chronic arthralgias in his ankles.  He does intermittently have to miss work and we completed to allow him to be covered for the potential expected future upcoming time that he may need to miss for both of these conditions.  Original was given back to patient and we  fax it to his work.  His controlled substance management was done.  He continues with the same intermittent chronic pains.  We discussed this at length as well as prescription managementTotal time over 25 minutes with over 50% counseling.     .        Review of systems: No fevers or chills, no other urinary complaints, no further GI complaints    PAST MEDICAL HISTORY:   1. ED.   2. HTN.   3. Smoker.   4. Allergic rhinitis with exposure to mold   5. Frequent bronchitis   6. Lumbar strain due to work on [employer]   7. Large colon polyp removed before age 50 - over 10-15 -yrs  8.  Chronic pain referable to ankle arthritis   9.  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 One younger brother with no medical problems    Vitals as above  Gen: no distress    Exam otherwise deferred,      Tr was seen today for medication refill, discuss medication and hypertension.    Diagnoses and all orders for this visit:    Metastatic colon cancer to liver, very openly discuss that is highly likely that this is a metastatic lesion that is enlarging in the liver.  He will follow through on biopsy and then continue follow-up with the recommended specialist    Other chronic pain    Cancer related pain    Essential hypertension, chronic and stable    Chronic pain of both ankles, source of chronic pain intermittently, prescription management done, FMLA forms completed    Other orders  -     Influenza - Quadrivalent (3 years & older)  -     HYDROcodone-acetaminophen (NORCO) 5-325 mg per tablet; Take 1 tablet by mouth every 6 (six) hours as needed for Pain.  -     HYDROcodone-acetaminophen (NORCO) 5-325 mg per tablet; Take 1 tablet by mouth every 6 (six) hours as needed for Pain.  -     HYDROcodone-acetaminophen (NORCO) 5-325 mg per  tablet; Take 1 tablet by mouth every 6 (six) hours as needed for Pain.

## 2018-10-15 ENCOUNTER — TELEPHONE (OUTPATIENT)
Dept: FAMILY MEDICINE | Facility: CLINIC | Age: 63
End: 2018-10-15

## 2018-10-15 NOTE — TELEPHONE ENCOUNTER
----- Message from Cornelius Bragg MD sent at 10/15/2018  6:23 AM CDT -----  Document shots same day please -cannot sign chart

## 2018-10-18 ENCOUNTER — HOSPITAL ENCOUNTER (OUTPATIENT)
Facility: HOSPITAL | Age: 63
Discharge: HOME | End: 2018-10-18
Attending: RADIOLOGY | Admitting: RADIOLOGY
Payer: COMMERCIAL

## 2018-10-18 ENCOUNTER — HOSPITAL ENCOUNTER (OUTPATIENT)
Dept: INTERVENTIONAL RADIOLOGY/VASCULAR | Facility: HOSPITAL | Age: 63
Discharge: HOME | End: 2018-10-18
Attending: INTERNAL MEDICINE | Admitting: RADIOLOGY
Payer: COMMERCIAL

## 2018-10-18 VITALS
BODY MASS INDEX: 23.62 KG/M2 | RESPIRATION RATE: 18 BRPM | TEMPERATURE: 98 F | HEIGHT: 75 IN | DIASTOLIC BLOOD PRESSURE: 79 MMHG | OXYGEN SATURATION: 97 % | WEIGHT: 190 LBS | HEART RATE: 62 BPM | SYSTOLIC BLOOD PRESSURE: 142 MMHG

## 2018-10-18 VITALS
RESPIRATION RATE: 18 BRPM | OXYGEN SATURATION: 98 % | DIASTOLIC BLOOD PRESSURE: 78 MMHG | HEART RATE: 72 BPM | SYSTOLIC BLOOD PRESSURE: 136 MMHG

## 2018-10-18 DIAGNOSIS — K76.9 LIVER LESION: ICD-10-CM

## 2018-10-18 PROCEDURE — 88307 TISSUE EXAM BY PATHOLOGIST: CPT | Mod: 26,,, | Performed by: PATHOLOGY

## 2018-10-18 PROCEDURE — 99152 MOD SED SAME PHYS/QHP 5/>YRS: CPT

## 2018-10-18 PROCEDURE — 27200940 IR BIOPSY LIVER

## 2018-10-18 PROCEDURE — 47000 NEEDLE BIOPSY OF LIVER PERQ: CPT | Mod: ,,, | Performed by: RADIOLOGY

## 2018-10-18 PROCEDURE — 88341 IMHCHEM/IMCYTCHM EA ADD ANTB: CPT | Mod: 26,,, | Performed by: PATHOLOGY

## 2018-10-18 PROCEDURE — 88307 TISSUE EXAM BY PATHOLOGIST: CPT | Performed by: PATHOLOGY

## 2018-10-18 PROCEDURE — 99152 MOD SED SAME PHYS/QHP 5/>YRS: CPT | Mod: ,,, | Performed by: RADIOLOGY

## 2018-10-18 PROCEDURE — 88342 IMHCHEM/IMCYTCHM 1ST ANTB: CPT | Performed by: PATHOLOGY

## 2018-10-18 PROCEDURE — 99153 MOD SED SAME PHYS/QHP EA: CPT

## 2018-10-18 PROCEDURE — 63600175 PHARM REV CODE 636 W HCPCS: Performed by: RADIOLOGY

## 2018-10-18 PROCEDURE — 88342 IMHCHEM/IMCYTCHM 1ST ANTB: CPT | Mod: 26,,, | Performed by: PATHOLOGY

## 2018-10-18 PROCEDURE — 77002 NEEDLE LOCALIZATION BY XRAY: CPT | Mod: 26,,, | Performed by: RADIOLOGY

## 2018-10-18 RX ORDER — FENTANYL CITRATE 50 UG/ML
INJECTION, SOLUTION INTRAMUSCULAR; INTRAVENOUS CODE/TRAUMA/SEDATION MEDICATION
Status: COMPLETED | OUTPATIENT
Start: 2018-10-18 | End: 2018-10-18

## 2018-10-18 RX ORDER — HYDROCODONE BITARTRATE AND ACETAMINOPHEN 5; 325 MG/1; MG/1
1 TABLET ORAL EVERY 4 HOURS PRN
Status: DISCONTINUED | OUTPATIENT
Start: 2018-10-18 | End: 2018-10-18 | Stop reason: HOSPADM

## 2018-10-18 RX ORDER — MIDAZOLAM HYDROCHLORIDE 1 MG/ML
INJECTION INTRAMUSCULAR; INTRAVENOUS CODE/TRAUMA/SEDATION MEDICATION
Status: COMPLETED | OUTPATIENT
Start: 2018-10-18 | End: 2018-10-18

## 2018-10-18 RX ADMIN — MIDAZOLAM HYDROCHLORIDE 0.5 MG: 1 INJECTION, SOLUTION INTRAMUSCULAR; INTRAVENOUS at 12:10

## 2018-10-18 RX ADMIN — MIDAZOLAM HYDROCHLORIDE 0.5 MG: 1 INJECTION, SOLUTION INTRAMUSCULAR; INTRAVENOUS at 11:10

## 2018-10-18 RX ADMIN — FENTANYL CITRATE 25 MCG: 50 INJECTION INTRAMUSCULAR; INTRAVENOUS at 12:10

## 2018-10-18 RX ADMIN — MIDAZOLAM HYDROCHLORIDE 1 MG: 1 INJECTION, SOLUTION INTRAMUSCULAR; INTRAVENOUS at 11:10

## 2018-10-18 RX ADMIN — FENTANYL CITRATE 25 MCG: 50 INJECTION INTRAMUSCULAR; INTRAVENOUS at 11:10

## 2018-10-18 RX ADMIN — FENTANYL CITRATE 50 MCG: 50 INJECTION INTRAMUSCULAR; INTRAVENOUS at 11:10

## 2018-10-18 NOTE — H&P
Radiology History & Physical      SUBJECTIVE:     Chief Complaint: Liver mass    History of Present Illness:  Tr Chen is a 63 y.o. male who presents for biopsy of new hypermetabolic liver mass.  Past Medical History:   Diagnosis Date    Cancer     metastatic colon cancer    Chronic pain 11/20/2012    Erectile dysfunction 5/16/2014    History of colonic polyps 7/16/2012    HTN (hypertension) 7/16/2012    Hypertension     Tobacco use disorder 1/19/2015     Past Surgical History:   Procedure Laterality Date    BIOPSY-LIVER Right 2/3/2017    [facility]    COLON SURGERY  12/27/2016    colon resection    COLONOSCOPY N/A 11/21/2016    Procedure: COLONOSCOPY;  Surgeon: Jono Resendez MD;  Location: [facility] ENDO;  Service: Endoscopy;  Laterality: N/A;  needs scope done 2/24/16  -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     COLONOSCOPY N/A 12/27/2017    Procedure: COLONOSCOPY;  Surgeon: Jono Resendez MD;  Location: [facility] ENDO;  Service: Endoscopy;  Laterality: N/A;    COLONOSCOPY N/A 12/27/2017    Performed by [facility]o Resendez MD at [facility] ENDO    COLONOSCOPY N/A 11/21/2016    Performed by Jono Resendez MD at [facility] ENDO    FRACTURE SURGERY      both feet    OPEN LOW ANTERIOR COLON RESECTION N/A 12/27/2016    Performed by Darrick Ruiz MD at [facility] OR    TONSILLECTOMY         Home Meds:   Prior to Admission medications    Medication Sig Start Date End Date Taking? Authorizing Provider   HYDROcodone-acetaminophen (NORCO) 5-325 mg per tablet Take 1 tablet by mouth every 6 (six) hours as needed for Pain. 11/12/18  Yes Cornelius Bragg MD   sildenafil (VIAGRA) 100 MG tablet Take 1 tablet (100 mg total) by mouth daily as needed for Erectile Dysfunction. half -1 Tablet Oral .  Take 30 min before anticipated need. 7/24/18  Yes CARLEY Ga-ARUN   HYDROcodone-acetaminophen (NORCO) 5-325 mg per tablet Take 1 tablet by mouth every 6 (six) hours as needed for Pain.  1/12/19   Cornelius Bragg MD   HYDROcodone-acetaminophen (NORCO) 5-325 mg per tablet Take 1 tablet by mouth every 6 (six) hours as needed for Pain. 12/12/18 12/22/18  Cornelius Bragg MD   tadalafil (CIALIS) 20 MG Tab Take 1 tablet (20 mg total) by mouth every 36 (thirty-six) hours. 1/30/18 1/30/19  Cornelius Bragg MD     Anticoagulants/Antiplatelets: no anticoagulation    Allergies:   Review of patient's allergies indicates:   Allergen Reactions    No known allergies      Sedation History:  no adverse reactions    Review of Systems:   Hematological: no known coagulopathies  Respiratory: no shortness of breath  Cardiovascular: no chest pain  Gastrointestinal: no abdominal pain  Genito-Urinary: no dysuria  Musculoskeletal: negative  Neurological: no TIA or stroke symptoms         OBJECTIVE:     Vital Signs (Most Recent)  Temp: 98.8 °F (37.1 °C) (10/18/18 0805)  Pulse: 89 (10/18/18 0805)  Resp: 18 (10/18/18 0805)  BP: 137/79 (10/18/18 0805)  SpO2: 97 % (10/18/18 0805)    Physical Exam:  ASA: 2  Mallampati: 2    General: no acute distress  Mental Status: alert and oriented to person, place and time  HEENT: normocephalic, atraumatic  Chest: unlabored breathing  Heart: regular heart rate  Abdomen: nondistended  Extremity: moves all extremities    Laboratory  Lab Results   Component Value Date    INR 1.0 10/12/2018       Lab Results   Component Value Date    WBC 5.19 10/12/2018    HGB 14.8 10/12/2018    HCT 43.5 10/12/2018    MCV 97 10/12/2018     10/12/2018      Lab Results   Component Value Date    GLU 92 10/12/2018     10/12/2018    K 4.7 10/12/2018     10/12/2018    CO2 26 10/12/2018    BUN 12 10/12/2018    CREATININE 0.9 10/12/2018    CALCIUM 9.5 10/12/2018    ALT 14 10/12/2018    AST 17 10/12/2018    ALBUMIN 3.9 10/12/2018    BILITOT 0.5 10/12/2018       ASSESSMENT/PLAN:     Sedation Plan: moderate  Patient will undergo liver mass biopsy (right lobe).    Antony Krause,  M.D.  Diagnostic and Interventional Radiologist  Department of Radiology  Pager: [phone]

## 2018-10-18 NOTE — PROCEDURES
Radiology Post-Procedure Note    Pre Op Diagnosis: right liver mass    Post Op Diagnosis: right liver mass    Procedure: right liver biopsy    Procedure performed by: Antony Krause MD    Written Informed Consent Obtained: Yes    Specimen Removed: YES 7 x 18 gauge cores    Estimated Blood Loss: Minimal    Findings:   Using CT guidance a 17g sheath needle was placed into a right liver mass. 18g monopty biopsy gun used to take 7 core biopsy samples. Specimen sent to pathology.     Patient tolerated procedure well.    Antony Krause M.D.  Diagnostic and Interventional Radiologist  Department of Radiology  Pager: [phone]

## 2018-10-18 NOTE — DISCHARGE SUMMARY
Radiology Discharge Summary      Hospital Course: No complications    Admit Date: 10/18/2018  Discharge Date: 10/18/2018     Instructions Given to Patient: Yes  Diet: Resume prior diet  Activity: activity as tolerated and no driving for today    Description of Condition on Discharge: Stable  Vital Signs (Most Recent): Temp: 98.8 °F (37.1 °C) (10/18/18 0805)  Pulse: 89 (10/18/18 0805)  Resp: 18 (10/18/18 0805)  BP: 137/79 (10/18/18 0805)  SpO2: 97 % (10/18/18 0805)    Discharge Disposition: Home    Discharge Diagnosis: Rectal cancer s/p liver biopsy. Follow up as scheduled.    Antony Krause M.D.  Diagnostic and Interventional Radiologist  Department of Radiology  Pager: [phone]

## 2018-10-18 NOTE — DISCHARGE INSTRUCTIONS
BATHING:  ? You may shower tomorrow.  DRESSING:  ? Remove dressing tomorrow.        ACTIVITY LEVEL: If you have received sedation or an anesthetic, you may feel sleepy for several hours. Rest until you are more awake. Gradually resume your normal activities    Do not drive, drink alcohol, or sign legal documents for 24 hours, or if taking narcotic pain medication.      DIET: You may resume your home diet. If nausea is present, increase your diet gradually with fluids and bland foods.    Medications: Pain medication should be taken only if needed and as directed. If antibiotics are prescribed, the medication should be taken until completed. You will be given an updated list of you medications.  ? No driving, alcoholic beverages or signing legal documents for next 24 hours if you have had sedation, or while taking pain medication    CALL THE DOCTOR:   For any obvious bleeding (some dried blood over the incision is normal).     Redness, swelling, foul smell around incision or fever over 101.  Shortness of breath.  Persistent pain or nausea not relieved by medication.  Call  [phone]     to speak with an Interventional Radiologist    If any unusual problems or difficulties occur contact your doctor. If you cannot contact your doctor but feel your signs and symptoms warrant a physicians attention return to the emergency room.           Fall Prevention  Millions of people fall every year and injure themselves. You may have had anesthesia or sedation which may increase your risk of falling. You may have health issues that put you at an increased risk of falling.     Here are ways to reduce your risk of falling.  ·   · Make your home safe by keeping walkways clear of objects you may trip over.  · Use non-slip pads under rugs. Do not use area rugs or small throw rugs.  · Use non-slip mats in bathtubs and showers.  · Install handrails and lights on staircases.  · Do not walk in poorly lit areas.  · Do not stand on chairs or  wobbly ladders.  · Use caution when reaching overhead or looking upward. This position can cause a loss of balance.  · Be sure your shoes fit properly, have non-slip bottoms and are in good condition.   · Wear shoes both inside and out. Avoid going barefoot or wearing slippers.  · Be cautious when going up and down stairs, curbs, and when walking on uneven sidewalks.  · If your balance is poor, consider using a cane or walker.  · If your fall was related to alcohol use, stop or limit alcohol intake.   · If your fall was related to use of sleeping medicines, talk to your doctor about this. You may need to reduce your dosage at bedtime if you awaken during the night to go to the bathroom.    · To reduce the need for nighttime bathroom trips:  ¨ Avoid drinking fluids for several hours before going to bed  ¨ Empty your bladder before going to bed  ¨ Men can keep a urinal at the bedside  · Stay as active as you can. Balance, flexibility, strength, and endurance all come from exercise. They all play a role in preventing falls. Ask your healthcare provider which types of activity are right for you.  · Get your vision checked on a regular basis.  · If you have pets, know where they are before you stand up or walk so you don't trip over them.  · Use night lights.

## 2018-11-06 ENCOUNTER — TELEPHONE (OUTPATIENT)
Dept: HEMATOLOGY/ONCOLOGY | Facility: CLINIC | Age: 63
End: 2018-11-06

## 2018-11-06 DIAGNOSIS — C20 RECTAL CANCER: Primary | ICD-10-CM

## 2018-11-06 DIAGNOSIS — C78.7 METASTASES TO THE LIVER: ICD-10-CM

## 2018-11-06 NOTE — TELEPHONE ENCOUNTER
Pt notified of pathology findings of liver biopsy which reveals adenocarcinoma    Plan Referral to IR for evaluation for liver directed therapy    Plan follow-up imaging       Pt elects to hold off on imaging for a couple of weeks    He will consider consultation with IR for liver directed therapy

## 2018-11-06 NOTE — TELEPHONE ENCOUNTER
requesting to speak with pt regarding biopsy results, no answer, left voicemail for pt to call office.

## 2018-11-09 ENCOUNTER — TELEPHONE (OUTPATIENT)
Dept: INTERVENTIONAL RADIOLOGY/VASCULAR | Facility: CLINIC | Age: 63
End: 2018-11-09

## 2018-11-09 NOTE — TELEPHONE ENCOUNTER
Left message for pt to return my call.  Need to schedule IR consult.  Please forward call to [phone].  Thanks

## 2018-12-12 ENCOUNTER — INITIAL CONSULT (OUTPATIENT)
Dept: INTERVENTIONAL RADIOLOGY/VASCULAR | Facility: CLINIC | Age: 63
End: 2018-12-12
Payer: COMMERCIAL

## 2018-12-12 VITALS
BODY MASS INDEX: 24.08 KG/M2 | SYSTOLIC BLOOD PRESSURE: 169 MMHG | HEIGHT: 75 IN | WEIGHT: 193.63 LBS | DIASTOLIC BLOOD PRESSURE: 90 MMHG | HEART RATE: 99 BPM

## 2018-12-12 DIAGNOSIS — I10 ESSENTIAL HYPERTENSION: ICD-10-CM

## 2018-12-12 DIAGNOSIS — C78.7 METASTATIC COLON CANCER TO LIVER: Primary | ICD-10-CM

## 2018-12-12 DIAGNOSIS — C18.9 METASTATIC COLON CANCER TO LIVER: Primary | ICD-10-CM

## 2018-12-12 DIAGNOSIS — C20 RECTAL CARCINOMA: ICD-10-CM

## 2018-12-12 PROCEDURE — 99999 PR PBB SHADOW E&M-EST. PATIENT-LVL III: CPT | Mod: PBBFAC,,,

## 2018-12-12 PROCEDURE — 99214 OFFICE O/P EST MOD 30 MIN: CPT | Mod: S$GLB,,, | Performed by: RADIOLOGY

## 2018-12-12 PROCEDURE — 3080F DIAST BP >= 90 MM HG: CPT | Mod: CPTII,S$GLB,, | Performed by: RADIOLOGY

## 2018-12-12 PROCEDURE — 3008F BODY MASS INDEX DOCD: CPT | Mod: CPTII,S$GLB,, | Performed by: RADIOLOGY

## 2018-12-12 PROCEDURE — 3077F SYST BP >= 140 MM HG: CPT | Mod: CPTII,S$GLB,, | Performed by: RADIOLOGY

## 2018-12-12 NOTE — PROGRESS NOTES
"Subjective:       Patient ID: Tr Chen is a 63 y.o. male.    Chief Complaint: Cancer    Patient here to discuss treatment of colon cancer that has metastasized to the liver. In 2016 he began experiencing intermittent rectal bleeding. A colonscopy was performed which revealed polyps noted in the descending colon, sigmoid colon, and rectum and also noted for a malignant-appearing partially obstructing tumor in the distal sigmoid colon with ulceration. He subsequently underwent a rectal sigmoidectomy on 12/27/2016. A PET scan in January of 2017 revealed a lesion in his liver. The lesion was biopsied in February: "no evidence of malignancy". He received chemotherapy and radiation therapy. A CT scan on 6/14/2018 revealed the liver lesion had grown from 8mm to 2.0cm. A PET scan on 8/16/2018 showed continued growth of this lesion to 3.8cm. The lesion was biopsied again on 10/18/2018. This biopsy revealed adenocarcinoma compatible with a metastatic carcinoma of colorectal origin. Patient denies any abdominal pain or distention. He reports he works for the [employer] and works 24hour call. He admits to difficulty with his diet since his surgery, chemotherapy, and radiation, but feels it is improving.       Review of Systems   Constitutional: Positive for appetite change (improving) and fatigue. Negative for activity change, chills and fever.   HENT: Positive for congestion (believes he has a cold). Negative for drooling, ear discharge, postnasal drip, sneezing and trouble swallowing.    Eyes: Negative for pain, discharge, redness and itching.   Respiratory: Negative for cough, shortness of breath, wheezing and stridor.    Cardiovascular: Negative for chest pain, palpitations and leg swelling.   Gastrointestinal: Positive for constipation (since surgery, chem, and radiation) and diarrhea (since surgery, chem, and radiation). Negative for abdominal distention, abdominal pain, nausea and vomiting.   Genitourinary: Negative " for difficulty urinating, dysuria, frequency and urgency.   Musculoskeletal: Positive for arthralgias (feet after walking a lot). Negative for back pain, gait problem, joint swelling, myalgias and neck pain.   Skin: Negative for color change, pallor and rash.   Neurological: Negative for dizziness, weakness and headaches.       Objective:      Physical Exam   Constitutional: He is oriented to person, place, and time. He appears well-developed and well-nourished.   HENT:   Head: Normocephalic and atraumatic.   Right Ear: External ear normal.   Left Ear: External ear normal.   Nose: Nose normal.   Mouth/Throat: Oropharynx is clear and moist. No oropharyngeal exudate.   Eyes: Conjunctivae are normal. Pupils are equal, round, and reactive to light. Right eye exhibits no discharge. Left eye exhibits no discharge. No scleral icterus.   Neck: Neck supple. No JVD present. No tracheal deviation present. No thyromegaly present.   Cardiovascular: Normal rate, regular rhythm, normal heart sounds and intact distal pulses. Exam reveals no gallop and no friction rub.   No murmur heard.  Pulmonary/Chest: Effort normal and breath sounds normal. No stridor. No respiratory distress. He has no wheezes. He has no rales.   Abdominal: Soft. Bowel sounds are normal. He exhibits no distension and no mass. There is no hepatosplenomegaly. There is no tenderness. There is no rebound and no guarding.   Musculoskeletal: He exhibits no edema.   Lymphadenopathy:     He has no cervical adenopathy.   Neurological: He is oriented to person, place, and time. Gait normal.   Skin: Skin is warm and dry. No cyanosis. Nails show no clubbing.   Psychiatric: He has a normal mood and affect.   Vitals reviewed.      Assessment:       1. Metastatic colon cancer to liver    2. Rectal carcinoma    3. Essential hypertension        Plan:         Explained to patient recommendation of Dr. Krause is to treat liver lesion with microwave ablation. Discussed how the  procedure will be performed, risk/benefits, possible complications, and pre-post procedure expectations, including the need for general anesthesia. Utilized images from patient's PET scan to help explain procedure. Patient verbalized understanding and agreement. We will call to schedule once we have coordinated with anesthesia. Clinic phone number provided.    Patient admits to not taking his blood pressure medications. He tells me his pressure has been running in the 120s. Advised to monitor blood pressure twice daily for 1 week. If greater than 140/90, then he should discuss restarting his blood pressure medications with his PCP. Verbalized understanding and agreement.

## 2018-12-13 ENCOUNTER — HOSPITAL ENCOUNTER (OUTPATIENT)
Dept: RADIOLOGY | Facility: HOSPITAL | Age: 63
Discharge: HOME | End: 2018-12-13
Attending: INTERNAL MEDICINE
Payer: COMMERCIAL

## 2018-12-13 DIAGNOSIS — C78.7 METASTASES TO THE LIVER: ICD-10-CM

## 2018-12-13 DIAGNOSIS — C20 RECTAL CANCER: ICD-10-CM

## 2018-12-13 PROCEDURE — A9552 F18 FDG: HCPCS

## 2018-12-13 PROCEDURE — 78815 PET IMAGE W/CT SKULL-THIGH: CPT | Mod: 26,PS,, | Performed by: RADIOLOGY

## 2018-12-13 PROCEDURE — 78815 PET IMAGE W/CT SKULL-THIGH: CPT | Mod: TC

## 2018-12-14 ENCOUNTER — OFFICE VISIT (OUTPATIENT)
Dept: HEMATOLOGY/ONCOLOGY | Facility: CLINIC | Age: 63
End: 2018-12-14
Payer: COMMERCIAL

## 2018-12-14 VITALS
HEART RATE: 88 BPM | OXYGEN SATURATION: 96 % | HEIGHT: 74 IN | SYSTOLIC BLOOD PRESSURE: 142 MMHG | BODY MASS INDEX: 24.47 KG/M2 | TEMPERATURE: 99 F | WEIGHT: 190.69 LBS | DIASTOLIC BLOOD PRESSURE: 60 MMHG

## 2018-12-14 DIAGNOSIS — Z92.3 HISTORY OF RADIATION THERAPY: ICD-10-CM

## 2018-12-14 DIAGNOSIS — Z92.21 HISTORY OF CHEMOTHERAPY: ICD-10-CM

## 2018-12-14 DIAGNOSIS — C78.7 METASTASES TO THE LIVER: ICD-10-CM

## 2018-12-14 DIAGNOSIS — C20 RECTAL CARCINOMA: Primary | ICD-10-CM

## 2018-12-14 PROCEDURE — 3077F SYST BP >= 140 MM HG: CPT | Mod: CPTII,S$GLB,, | Performed by: INTERNAL MEDICINE

## 2018-12-14 PROCEDURE — 3078F DIAST BP <80 MM HG: CPT | Mod: CPTII,S$GLB,, | Performed by: INTERNAL MEDICINE

## 2018-12-14 PROCEDURE — 3008F BODY MASS INDEX DOCD: CPT | Mod: CPTII,S$GLB,, | Performed by: INTERNAL MEDICINE

## 2018-12-14 PROCEDURE — 99999 PR PBB SHADOW E&M-EST. PATIENT-LVL III: CPT | Mod: PBBFAC,,, | Performed by: INTERNAL MEDICINE

## 2018-12-14 PROCEDURE — 99214 OFFICE O/P EST MOD 30 MIN: CPT | Mod: S$GLB,,, | Performed by: INTERNAL MEDICINE

## 2018-12-14 NOTE — PROGRESS NOTES
Subjective:       Patient ID: Tr Chen is a 63 y.o. male.    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 CT imaging studies negative for evidence of distant disease.  1/27/2017 PET/CT imaging  reveals   hepatic metastatic lesion . He subsequently underwent CT guided liver bx 2/3/2017 - NEG for malignancy .     status post LAR,12/27/2016.pT3 pN1cM0 Stage IIIB  He is s/p adjuvant XELOX cycle 4  Completed 4/17/2017  Followed by chemo/RT initiated  5/22/2017   He completed XRT July 6, 2017  s/p partial completion of XELODA approx 6/21/2017 ( due to ASE)       PET scan on 8/16/2018 showed continued growth of this lesion to 3.8cm. LIver lesion was biopsied again on 10/18/2018. Pathology revealed adenocarcinoma compatible with a metastatic carcinoma of colorectal origin.       Follow-up postponed per pt preference due to job     Today, he is doing well  He continues to  work full-time   He has occasional loose stools since his surg  Appetite and weight stable   No shortness of breath or chest pain  No abd pain   No N/V   No melena, hematochezia   He continues to smoke     Surveillance colonoscopy 12/27/2017- NEG     CT c/a/p w/contrast 6/11/2018 reveals  interval enlargement of a low-attenuation region in the region of the previously seen metastasis from PET scan of     Pt delayed follow-up CT ( work related issues)   PET/CT 8/16/2018 reveals Progression of hepatic metastatic disease     Pt delayed follow up imaging ( work related issues)  Recent PET/CT 12/18/2018 reveals  Isolated right lobe liver metastasis more metabolic from the prior study more prominent worrisome for progression and poor response to therapy.  No new lesions are seen.      He is being considered per IR for local ablation  procedure for hepatic lesion    Prev Hx: Pt  with past medical history of colonic polyps, hypertension, tobacco abuse, seen today in consultation for recently diagnosed rectosigmoid adenocarcinoma.  The patient reports intermittent rectal bleeding for the past 3 to 4 months.  He subsequently underwent a colonoscopy on 2016, which revealed polyps noted in the descending colon, sigmoid colon, and rectum and also noted for a malignant-appearing partially obstructing tumor in the distal sigmoid colon with ulceration. Pathology of the rectosigmoid mass revealed a tubulovillous adenoma and polyps benign..The patient reports he underwent a colonoscopy during his 40s for rectal bleeding.  He was diagnosed with a large bleeding polyp, for which he underwent removal.  He did not undergo surveillance colonoscopies as advised.  No family history of colon cancer.  CT of the abdomen and pelvis on 2016 revealed thickening of the rectal wall, findings, rectosigmoid neoplasm at the rectosigmoid junction and numerous hepatic lesions compatible with hepatic cysts. He  subsequently underwent a rectal sigmoidectomy on 2016 .  Pathology revealed an adenocarcinoma, low grade, clear margins. The patient underwent removal of 24 lymph nodes with 0 lymph nodes involved.  Evidence of tumor deposits noted with pathologic staging pT3, pN1c.      PAST MEDICAL HISTORY:  Erectile dysfunction, colonic polyps, hypertension, tobacco abuse disorder, chronic pain.    PAST SURGICAL HISTORY:  As above.    SOCIAL HISTORY:  He is a former smoker.  He has a greater than 40-pack-year.  He drinks socially.  He is employed as an  for [employer]  .    FAMILY HISTORY:  Mom diagnosed with breast cancer at age 61.  Dad  at age 87 from Alzheimer's.  He has one sibling, brother in overall good health.    Therapy:  s/p Xeloda  1500mg bid Xeloda 2 wks on 1 wk off / Oxaliplatin 130mg/m2 Day 1 q 21 d  X 4 completed  "4/17/2017     S/p partial completion of concurrent chemo/RT completed July 6.2017      Review of Systems   Constitutional: Negative for appetite change, fatigue, fever and unexpected weight change.   HENT: Negative for mouth sores and nosebleeds.    Eyes: Negative for visual disturbance.   Respiratory: Negative for cough and shortness of breath.    Cardiovascular: Negative for chest pain and leg swelling.   Gastrointestinal: Positive for diarrhea (since surg). Negative for abdominal pain, blood in stool, constipation, nausea and vomiting.   Genitourinary: Negative for frequency and hematuria.   Musculoskeletal: Positive for arthralgias. Negative for back pain.   Skin: Negative for rash.   Neurological: Negative for dizziness, weakness, light-headedness and headaches.   Hematological: Negative for adenopathy.       Objective:       Vitals:    12/14/18 0848 12/14/18 0850   BP: (!) 168/83 (!) 142/60   BP Location: Left arm Left arm   Patient Position: Sitting Sitting   BP Method: Large (Automatic) Large (Manual)   Pulse: 88    Temp: 98.6 °F (37 °C)    TempSrc: Oral    SpO2: 96%    Weight: 86.5 kg (190 lb 11.2 oz)    Height: 6' 1.5" (1.867 m)        Physical Exam   Constitutional: He is oriented to person, place, and time. He appears well-developed and well-nourished.   HENT:   Head: Normocephalic.   Mouth/Throat: Oropharynx is clear and moist. No oropharyngeal exudate.   Eyes: Conjunctivae are normal. No scleral icterus.   Neck: Normal range of motion. Neck supple. No thyromegaly present.   Cardiovascular: Normal rate, regular rhythm and normal heart sounds.   No murmur heard.  Pulmonary/Chest: Effort normal and breath sounds normal. He has no wheezes. He has no rales.   Abdominal: Soft. Bowel sounds are normal. He exhibits no distension and no mass. There is no hepatosplenomegaly. There is no tenderness. There is no rebound and no guarding.   Musculoskeletal: Normal range of motion. He exhibits no edema. "   Lymphadenopathy:     He has no cervical adenopathy.     He has no axillary adenopathy.        Right: No supraclavicular adenopathy present.        Left: No supraclavicular adenopathy present.   Neurological: He is alert and oriented to person, place, and time. No cranial nerve deficit.   Skin: No rash noted. No erythema.   Psychiatric: He has a normal mood and affect.       1/27/2017 PET/CT reveals   hepatic metastatic lesion    Pathology 2/03/2017  LIVER BIOPSIES, CORE BIOPSIES-BENIGN HEPATIC PARENCHYMA. THERE IS MILD MACRO VACUOLAR FATTY CHANGE AND MILD NONSPECIFIC FOCAL CHRONIC TRIADITIS. THERE IS NO EVIDENCE OF TUMOR OR GRANULOMATA PRESENT. A FOCAL AREA OF NONSPECIFIC FIBROSIS SUGGESTIVE OF SCAR TISSUE IS NOTED. THERE IS NO EVIDENCE OF MALIGNANCY.        CT a/p w/contrast 11/20/2017   1.  Status post sigmoid resection, no recurrent or metastatic disease.    2.  Centrilobular emphysema lungs.    3.  Liver cysts benign and stable.    4.  Nonobstructed left inferior gallstone.          Results for BRANDY OLIVA (MRN [id number]) as of 12/14/2018 16:07   Ref. Range 6/14/2018 07:57 7/26/2018 12:31 9/12/2018 12:01 10/12/2018 09:30 12/13/2018 09:50   CEA Latest Ref Range: 0.0 - 5.0 ng/mL 7.9 (H) 7.2 (H) 7.1 (H) 6.9 (H) 7.3 (H)         CT c/a/p w/contrast 6/11/2018  There has been interval enlargement of a low-attenuation region in the region of the previously seen metastasis from PET scan of January 2017.  Repeat PET-CT for re-evaluation is recommended.    PET/CT 8/16/2018 Progression of hepatic metastatic disease in this patient with a history of rectal cancer status post low anterior resection.      Isolated right lobe liver metastasis more metabolic from the prior study more prominent worrisome for progression and poor response to therapy.  No new lesions are seen.    Index right lobe liver lesion SUV max 12.86, previously 10.76.    Assessment:       1. Rectal carcinoma    2. Metastases to the liver    3. History  of chemotherapy    4. 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    CT imaging  - negative for evidence of distant disease.     PET/CT imaging studies - hepatic lesion  S/p liver bx 2/3/2017  - benign   status post LAR,12/27/2016.pT3 pN1cM0 Stage IIIB  He is s/p adjuvant XELOX cycle 4  Completed 4/17/2017  Followed by chemo/RT initiated  5/22/2017   s/p partial completion of XELODA approx 6/21/2017 ( due to ASE)      Surveillance colonoscopy 12/27/2017- NEG  Follow-up C-scope per GI planned 12/2019   CT c/a/p w/contrast 6/11/2018 reveals  interval enlargement of a low-attenuation region in the region of the previously seen metastasis from PET scan of   Pt delayed follow-up CT ( work related issues)   PET/CT 8/16/2018 reveals Progression of hepatic metastatic disease       Recent PET/CT 12/18/2018 reveals  Isolated right lobe liver metastasis more metabolic from the prior study more prominent worrisome for progression and poor response to therapy.  No new lesions are seen.  Discussed recent radiographic findings with patient     He is being considered per IR for local ablation procedure for hepatic lesion  Recent imaging reveals no new areas of disease.     Pt awaiting scheduling of procedure per IR    F/u  2 month with  CBC, CMP,CEA f/u imaging studies prior to f/u ( or sooner pending f/u imaging results)       D/w Dr. Krause ( IR)      CC: ROSEANN Lai M.D., MD Craig Ehrensing, MD Juan Gimenez, MD

## 2018-12-17 DIAGNOSIS — C18.9 METASTATIC COLON CANCER TO LIVER: ICD-10-CM

## 2018-12-17 DIAGNOSIS — C78.7 METASTATIC COLON CANCER TO LIVER: ICD-10-CM

## 2018-12-17 DIAGNOSIS — C20 RECTAL CARCINOMA: Primary | ICD-10-CM

## 2018-12-28 ENCOUNTER — TELEPHONE (OUTPATIENT)
Dept: INTERVENTIONAL RADIOLOGY/VASCULAR | Facility: HOSPITAL | Age: 63
End: 2018-12-28

## 2018-12-28 VITALS — HEIGHT: 75 IN | WEIGHT: 195 LBS | BODY MASS INDEX: 24.25 KG/M2

## 2018-12-28 DIAGNOSIS — C18.9 METASTATIC COLON CANCER TO LIVER: Primary | ICD-10-CM

## 2018-12-28 DIAGNOSIS — C78.7 METASTATIC COLON CANCER TO LIVER: Primary | ICD-10-CM

## 2018-12-30 ENCOUNTER — ANESTHESIA EVENT (OUTPATIENT)
Dept: ENDOSCOPY | Facility: HOSPITAL | Age: 63
End: 2018-12-30
Payer: COMMERCIAL

## 2018-12-31 ENCOUNTER — ANESTHESIA (OUTPATIENT)
Dept: ENDOSCOPY | Facility: HOSPITAL | Age: 63
End: 2018-12-31
Payer: COMMERCIAL

## 2018-12-31 ENCOUNTER — HOSPITAL ENCOUNTER (OUTPATIENT)
Facility: HOSPITAL | Age: 63
Discharge: HOME | End: 2018-12-31
Attending: RADIOLOGY | Admitting: RADIOLOGY
Payer: COMMERCIAL

## 2018-12-31 VITALS
SYSTOLIC BLOOD PRESSURE: 158 MMHG | OXYGEN SATURATION: 97 % | HEART RATE: 76 BPM | TEMPERATURE: 98 F | RESPIRATION RATE: 14 BRPM | HEIGHT: 75 IN | WEIGHT: 193 LBS | DIASTOLIC BLOOD PRESSURE: 85 MMHG | BODY MASS INDEX: 24 KG/M2

## 2018-12-31 DIAGNOSIS — C78.7 METASTATIC COLON CANCER TO LIVER: ICD-10-CM

## 2018-12-31 DIAGNOSIS — C18.9 METASTATIC COLON CANCER TO LIVER: ICD-10-CM

## 2018-12-31 DIAGNOSIS — C20 RECTAL CARCINOMA: ICD-10-CM

## 2018-12-31 PROCEDURE — 63600175 PHARM REV CODE 636 W HCPCS

## 2018-12-31 PROCEDURE — 25000003 PHARM REV CODE 250: Performed by: FAMILY MEDICINE

## 2018-12-31 PROCEDURE — 71000039 HC RECOVERY, EACH ADD'L HOUR

## 2018-12-31 PROCEDURE — D9220A PRA ANESTHESIA: Mod: ANES,,, | Performed by: ANESTHESIOLOGY

## 2018-12-31 PROCEDURE — 63600175 PHARM REV CODE 636 W HCPCS: Performed by: NURSE ANESTHETIST, CERTIFIED REGISTERED

## 2018-12-31 PROCEDURE — 37000009 HC ANESTHESIA EA ADD 15 MINS

## 2018-12-31 PROCEDURE — 71000033 HC RECOVERY, INTIAL HOUR

## 2018-12-31 PROCEDURE — D9220A PRA ANESTHESIA: Mod: CRNA,,, | Performed by: NURSE ANESTHETIST, CERTIFIED REGISTERED

## 2018-12-31 PROCEDURE — 63600175 PHARM REV CODE 636 W HCPCS: Performed by: FAMILY MEDICINE

## 2018-12-31 PROCEDURE — 37000008 HC ANESTHESIA 1ST 15 MINUTES

## 2018-12-31 PROCEDURE — 25000003 PHARM REV CODE 250: Performed by: RADIOLOGY

## 2018-12-31 PROCEDURE — 27000221 HC OXYGEN, UP TO 24 HOURS

## 2018-12-31 PROCEDURE — S0030 INJECTION, METRONIDAZOLE: HCPCS | Performed by: FAMILY MEDICINE

## 2018-12-31 PROCEDURE — 94761 N-INVAS EAR/PLS OXIMETRY MLT: CPT

## 2018-12-31 PROCEDURE — 63600175 PHARM REV CODE 636 W HCPCS: Performed by: ANESTHESIOLOGY

## 2018-12-31 PROCEDURE — 25000003 PHARM REV CODE 250: Performed by: ANESTHESIOLOGY

## 2018-12-31 PROCEDURE — 25000003 PHARM REV CODE 250: Performed by: NURSE ANESTHETIST, CERTIFIED REGISTERED

## 2018-12-31 PROCEDURE — 25000003 PHARM REV CODE 250: Performed by: STUDENT IN AN ORGANIZED HEALTH CARE EDUCATION/TRAINING PROGRAM

## 2018-12-31 RX ORDER — HYDRALAZINE HYDROCHLORIDE 20 MG/ML
INJECTION INTRAMUSCULAR; INTRAVENOUS
Status: COMPLETED
Start: 2018-12-31 | End: 2018-12-31

## 2018-12-31 RX ORDER — DIPHENHYDRAMINE HYDROCHLORIDE 50 MG/ML
25 INJECTION INTRAMUSCULAR; INTRAVENOUS EVERY 6 HOURS PRN
Status: DISCONTINUED | OUTPATIENT
Start: 2018-12-31 | End: 2018-12-31 | Stop reason: HOSPADM

## 2018-12-31 RX ORDER — PROPOFOL 10 MG/ML
VIAL (ML) INTRAVENOUS
Status: DISCONTINUED | OUTPATIENT
Start: 2018-12-31 | End: 2018-12-31

## 2018-12-31 RX ORDER — HYDRALAZINE HYDROCHLORIDE 20 MG/ML
10 INJECTION INTRAMUSCULAR; INTRAVENOUS ONCE
Status: COMPLETED | OUTPATIENT
Start: 2018-12-31 | End: 2018-12-31

## 2018-12-31 RX ORDER — HYDROMORPHONE HYDROCHLORIDE 1 MG/ML
0.2 INJECTION, SOLUTION INTRAMUSCULAR; INTRAVENOUS; SUBCUTANEOUS EVERY 5 MIN PRN
Status: DISCONTINUED | OUTPATIENT
Start: 2018-12-31 | End: 2018-12-31 | Stop reason: HOSPADM

## 2018-12-31 RX ORDER — HYDROMORPHONE HYDROCHLORIDE 1 MG/ML
1 INJECTION, SOLUTION INTRAMUSCULAR; INTRAVENOUS; SUBCUTANEOUS EVERY 4 HOURS PRN
Status: DISCONTINUED | OUTPATIENT
Start: 2018-12-31 | End: 2018-12-31 | Stop reason: HOSPADM

## 2018-12-31 RX ORDER — CIPROFLOXACIN 2 MG/ML
400 INJECTION, SOLUTION INTRAVENOUS ONCE
Status: COMPLETED | OUTPATIENT
Start: 2018-12-31 | End: 2018-12-31

## 2018-12-31 RX ORDER — ONDANSETRON 2 MG/ML
4 INJECTION INTRAMUSCULAR; INTRAVENOUS ONCE AS NEEDED
Status: DISCONTINUED | OUTPATIENT
Start: 2018-12-31 | End: 2018-12-31 | Stop reason: HOSPADM

## 2018-12-31 RX ORDER — LIDOCAINE HCL/PF 100 MG/5ML
SYRINGE (ML) INTRAVENOUS
Status: DISCONTINUED | OUTPATIENT
Start: 2018-12-31 | End: 2018-12-31

## 2018-12-31 RX ORDER — AMOXICILLIN AND CLAVULANATE POTASSIUM 500; 125 MG/1; MG/1
1 TABLET, FILM COATED ORAL 2 TIMES DAILY
Qty: 14 TABLET | Refills: 0 | Status: SHIPPED | OUTPATIENT
Start: 2018-12-31 | End: 2019-01-07

## 2018-12-31 RX ORDER — METRONIDAZOLE 500 MG/100ML
500 INJECTION, SOLUTION INTRAVENOUS ONCE
Status: COMPLETED | OUTPATIENT
Start: 2018-12-31 | End: 2018-12-31

## 2018-12-31 RX ORDER — FENTANYL CITRATE 50 UG/ML
25 INJECTION, SOLUTION INTRAMUSCULAR; INTRAVENOUS EVERY 5 MIN PRN
Status: DISCONTINUED | OUTPATIENT
Start: 2018-12-31 | End: 2018-12-31 | Stop reason: HOSPADM

## 2018-12-31 RX ORDER — FENTANYL CITRATE 50 UG/ML
INJECTION, SOLUTION INTRAMUSCULAR; INTRAVENOUS
Status: COMPLETED
Start: 2018-12-31 | End: 2018-12-31

## 2018-12-31 RX ORDER — LABETALOL HCL 20 MG/4 ML
SYRINGE (ML) INTRAVENOUS
Status: COMPLETED
Start: 2018-12-31 | End: 2018-12-31

## 2018-12-31 RX ORDER — FENTANYL CITRATE 50 UG/ML
INJECTION, SOLUTION INTRAMUSCULAR; INTRAVENOUS
Status: DISCONTINUED | OUTPATIENT
Start: 2018-12-31 | End: 2018-12-31

## 2018-12-31 RX ORDER — LIDOCAINE HYDROCHLORIDE 10 MG/ML
1 INJECTION, SOLUTION EPIDURAL; INFILTRATION; INTRACAUDAL; PERINEURAL ONCE
Status: COMPLETED | OUTPATIENT
Start: 2018-12-31 | End: 2018-12-31

## 2018-12-31 RX ORDER — ROCURONIUM BROMIDE 10 MG/ML
INJECTION, SOLUTION INTRAVENOUS
Status: DISCONTINUED | OUTPATIENT
Start: 2018-12-31 | End: 2018-12-31

## 2018-12-31 RX ORDER — ACETAMINOPHEN 325 MG/1
650 TABLET ORAL EVERY 4 HOURS PRN
Status: DISCONTINUED | OUTPATIENT
Start: 2018-12-31 | End: 2018-12-31 | Stop reason: HOSPADM

## 2018-12-31 RX ORDER — SODIUM CHLORIDE 9 MG/ML
500 INJECTION, SOLUTION INTRAVENOUS ONCE
Status: COMPLETED | OUTPATIENT
Start: 2018-12-31 | End: 2018-12-31

## 2018-12-31 RX ORDER — LABETALOL HYDROCHLORIDE 5 MG/ML
10 INJECTION, SOLUTION INTRAVENOUS ONCE
Status: COMPLETED | OUTPATIENT
Start: 2018-12-31 | End: 2018-12-31

## 2018-12-31 RX ORDER — MIDAZOLAM HYDROCHLORIDE 1 MG/ML
INJECTION, SOLUTION INTRAMUSCULAR; INTRAVENOUS
Status: DISCONTINUED | OUTPATIENT
Start: 2018-12-31 | End: 2018-12-31

## 2018-12-31 RX ORDER — ONDANSETRON 4 MG/1
4 TABLET, FILM COATED ORAL EVERY 6 HOURS PRN
Qty: 28 TABLET | Refills: 0 | Status: SHIPPED | OUTPATIENT
Start: 2018-12-31 | End: 2023-02-13 | Stop reason: CLARIF

## 2018-12-31 RX ORDER — SODIUM CHLORIDE 9 MG/ML
INJECTION, SOLUTION INTRAVENOUS CONTINUOUS PRN
Status: DISCONTINUED | OUTPATIENT
Start: 2018-12-31 | End: 2018-12-31

## 2018-12-31 RX ORDER — OXYCODONE HYDROCHLORIDE 5 MG/1
5 TABLET ORAL EVERY 4 HOURS PRN
Qty: 10 TABLET | Refills: 0 | Status: SHIPPED | OUTPATIENT
Start: 2018-12-31 | End: 2019-08-26

## 2018-12-31 RX ORDER — HYDROCODONE BITARTRATE AND ACETAMINOPHEN 5; 325 MG/1; MG/1
1 TABLET ORAL EVERY 4 HOURS PRN
Status: DISCONTINUED | OUTPATIENT
Start: 2018-12-31 | End: 2018-12-31 | Stop reason: HOSPADM

## 2018-12-31 RX ORDER — MEPERIDINE HYDROCHLORIDE 50 MG/ML
12.5 INJECTION INTRAMUSCULAR; INTRAVENOUS; SUBCUTANEOUS ONCE AS NEEDED
Status: DISCONTINUED | OUTPATIENT
Start: 2018-12-31 | End: 2018-12-31 | Stop reason: HOSPADM

## 2018-12-31 RX ADMIN — SODIUM CHLORIDE: 0.9 INJECTION, SOLUTION INTRAVENOUS at 01:12

## 2018-12-31 RX ADMIN — LIDOCAINE HYDROCHLORIDE 100 MG: 20 INJECTION, SOLUTION INTRAVENOUS at 02:12

## 2018-12-31 RX ADMIN — SODIUM CHLORIDE 1000 ML: 0.9 INJECTION, SOLUTION INTRAVENOUS at 12:12

## 2018-12-31 RX ADMIN — LABETALOL HYDROCHLORIDE 10 MG: 5 INJECTION, SOLUTION INTRAVENOUS at 02:12

## 2018-12-31 RX ADMIN — ROCURONIUM BROMIDE 50 MG: 10 INJECTION, SOLUTION INTRAVENOUS at 02:12

## 2018-12-31 RX ADMIN — FENTANYL CITRATE 50 MCG: 50 INJECTION, SOLUTION INTRAMUSCULAR; INTRAVENOUS at 02:12

## 2018-12-31 RX ADMIN — HYDROCODONE BITARTRATE AND ACETAMINOPHEN 1 TABLET: 5; 325 TABLET ORAL at 06:12

## 2018-12-31 RX ADMIN — HYDRALAZINE HYDROCHLORIDE 10 MG: 20 INJECTION INTRAMUSCULAR; INTRAVENOUS at 05:12

## 2018-12-31 RX ADMIN — PROPOFOL 130 MG: 10 INJECTION, EMULSION INTRAVENOUS at 02:12

## 2018-12-31 RX ADMIN — FENTANYL CITRATE 25 MCG: 50 INJECTION INTRAMUSCULAR; INTRAVENOUS at 03:12

## 2018-12-31 RX ADMIN — PROPOFOL 50 MG: 10 INJECTION, EMULSION INTRAVENOUS at 02:12

## 2018-12-31 RX ADMIN — FENTANYL CITRATE 25 MCG: 50 INJECTION INTRAMUSCULAR; INTRAVENOUS at 04:12

## 2018-12-31 RX ADMIN — PROPOFOL 50 MG: 10 INJECTION, EMULSION INTRAVENOUS at 03:12

## 2018-12-31 RX ADMIN — MIDAZOLAM HYDROCHLORIDE 2 MG: 1 INJECTION, SOLUTION INTRAMUSCULAR; INTRAVENOUS at 02:12

## 2018-12-31 RX ADMIN — LIDOCAINE HYDROCHLORIDE 1 MG: 10 INJECTION, SOLUTION EPIDURAL; INFILTRATION; INTRACAUDAL; PERINEURAL at 12:12

## 2018-12-31 RX ADMIN — CIPROFLOXACIN 400 MG: 2 INJECTION, SOLUTION INTRAVENOUS at 12:12

## 2018-12-31 RX ADMIN — METRONIDAZOLE 500 MG: 500 SOLUTION INTRAVENOUS at 02:12

## 2018-12-31 NOTE — DISCHARGE SUMMARY
Radiology Discharge Summary      Hospital Course: No complications    Admit Date: 12/31/2018  Discharge Date: 12/31/2018     Instructions Given to Patient: Yes  Diet: Resume prior diet  Activity: activity as tolerated and no driving for today    Description of Condition on Discharge: Stable  Vital Signs (Most Recent): Temp: 98.1 °F (36.7 °C) (12/31/18 1148)  Pulse: 91 (12/31/18 1148)  Resp: 20 (12/31/18 1148)  BP: (!) 140/83 (12/31/18 1210)  SpO2: 100 % (12/31/18 1148)    Discharge Disposition: Home    Discharge Diagnosis: Rectal cancer s/p ablation of liver lesion. Follow up as scheduled.    Antony Krause M.D.  Diagnostic and Interventional Radiologist  Department of Radiology  Pager: [phone]

## 2018-12-31 NOTE — ANESTHESIA POSTPROCEDURE EVALUATION
"Anesthesia Post Evaluation    Patient: Tr Chen    Procedure(s) Performed: Procedure(s) (LRB):  Radiofrequency Ablation (N/A)    Final Anesthesia Type: general  Patient location during evaluation: PACU  Patient participation: Yes- Able to Participate  Level of consciousness: awake and alert  Post-procedure vital signs: reviewed and stable  Pain management: adequate  Airway patency: patent  PONV status at discharge: No PONV  Anesthetic complications: no      Cardiovascular status: hypertensive  Respiratory status: spontaneous ventilation and room air  Hydration status: euvolemic  Follow-up needed (Hypertension being treated with labetalol)         Visit Vitals  BP (!) 203/105 (BP Location: Left arm, Patient Position: Lying)   Pulse 71   Temp 36.5 °C (97.7 °F) (Temporal)   Resp 16   Ht 6' 3" (1.905 m)   Wt 87.5 kg (193 lb)   SpO2 95%   BMI 24.12 kg/m²       Pain/Anuradha Score: Pain Rating Prior to Med Admin: 4 (12/31/2018  4:04 PM)  Pain Rating Post Med Admin: 3 (12/31/2018  4:04 PM)        "

## 2018-12-31 NOTE — DISCHARGE INSTRUCTIONS
No baths or swimming for 48Hrs. Showers only. No heavy lifting for 24 hrs. Repeat imaging in 1 month.

## 2018-12-31 NOTE — H&P
Radiology History & Physical      SUBJECTIVE:     Chief Complaint: metastatic colon cancer    History of Present Illness:  Tr Chen is a 63 y.o. male who presents for A.    Past Medical History:   Diagnosis Date    Cancer     metastatic colon cancer    Chronic heel pain     Bilateral    Chronic pain 11/20/2012    Erectile dysfunction 5/16/2014    History of colonic polyps 7/16/2012    HTN (hypertension) 7/16/2012    Hypertension     Liver lesion     October biopsy showed liver mets    Tobacco use disorder 1/19/2015     Past Surgical History:   Procedure Laterality Date    BIOPSY, LIVER N/A 10/18/2018    Performed by [facility]    BIOPSY-LIVER Right 2/3/2017    Performed by [facility] OR    COLON SURGERY  12/27/2016    colon resection    COLONOSCOPY N/A 12/27/2017    Performed by [facility] at [facility] ENDO    COLONOSCOPY N/A 11/21/2016    [facility]formed by [facility] ENDO    FRACTURE SURGERY      both feet    OPEN LOW ANTERIOR COLON RESECTION N/A 12/27/2016    Performed by Darrick Ruiz [facility] at [facility] OR    TONSILLECTOMY         Home Meds:   Prior to Admission medications    Medication Sig Start Date End Date Taking? Authorizing Provider   HYDROcodone-acetaminophen (NORCO) 5-325 mg per tablet Take 1 tablet by mouth every 6 (six) hours as needed for Pain. 11/12/18   Cornelius Bragg MD   sildenafil (VIAGRA) 100 MG tablet Take 1 tablet (100 mg total) by mouth daily as needed for Erectile Dysfunction. half -1 Tablet Oral .  Take 30 min before anticipated need. 7/24/18   Danya Plaza, JAMIP-C   tadalafil (CIALIS) 20 MG Tab Take 1 tablet (20 mg total) by mouth every 36 (thirty-six) hours. 1/30/18 1/30/19  Cornelius Bragg MD     Anticoagulants/Antiplatelets: no anticoagulation    Allergies:   Review of patient's allergies indicates:   Allergen Reactions    No known allergies      Sedation History:  no adverse  "reactions    Review of Systems:   Hematological: no known coagulopathies  Respiratory: no shortness of breath  Cardiovascular: no chest pain  Gastrointestinal: no abdominal pain  Genito-Urinary: no dysuria  Musculoskeletal: negative  Neurological: no TIA or stroke symptoms         OBJECTIVE:     Vital Signs (Most Recent)  Temp: 98.1 °F (36.7 °C) (12/31/18 1148)  Pulse: 91 (12/31/18 1148)  Resp: 20 (12/31/18 1148)  BP: (!) 140/83 (12/31/18 1148)  SpO2: 100 % (12/31/18 1148)    Physical Exam:  ASA: 2  Mallampati: 2    General: no acute distress  Mental Status: alert and oriented to person, place and time  HEENT: normocephalic, atraumatic  Chest: unlabored breathing  Heart: regular heart rate  Abdomen: nondistended  Extremity: moves all extremities    Laboratory  Lab Results   Component Value Date    INR 1.0 10/12/2018       Lab Results   Component Value Date    WBC 8.45 12/13/2018    HGB 15.2 12/13/2018    HCT 44.7 12/13/2018    MCV 97 12/13/2018     12/13/2018      Lab Results   Component Value Date    GLU 98 12/13/2018     12/13/2018    K 5.0 12/13/2018     12/13/2018    CO2 27 12/13/2018    BUN 10 12/13/2018    CREATININE 0.9 12/13/2018    CALCIUM 9.6 12/13/2018    ALT 14 12/13/2018    AST 18 12/13/2018    ALBUMIN 4.0 12/13/2018    BILITOT 0.3 12/13/2018       ASSESSMENT/PLAN:     Sedation Plan: general  Patient will undergo MWA of liver lesion    Bar Ibarra MD ([place])  Radiology PGY-5  [phone]      "

## 2018-12-31 NOTE — PROCEDURES
Radiology Post-Procedure Note    Pre Op Diagnosis: Rectal cancer    Post Op Diagnosis: Same    Procedure: MWA    Procedure performed by: Antony Krause MD    Written Informed Consent Obtained: Yes  Specimen Removed: NO  Estimated Blood Loss: Minimal    Findings:   Under US and CT guidance three 15 gauge probes were advanced to right liver lobe hypermetabolic lesion and ablation carried out according to protocol. Overlapping ablation zones were performed. No complications.    Patient tolerated procedure well.    Antony Krause M.D.  Diagnostic and Interventional Radiologist  Department of Radiology  Pager: [phone]

## 2018-12-31 NOTE — TRANSFER OF CARE
"Anesthesia Transfer of Care Note    Patient: Tr Chen    Procedure(s) Performed: Procedure(s) (LRB):  Radiofrequency Ablation (N/A)    Patient location: PACU    Anesthesia Type: general    Transport from OR: Transported from OR on 6-10 L/min O2 by face mask with adequate spontaneous ventilation    Post pain: adequate analgesia    Post assessment: no apparent anesthetic complications and tolerated procedure well    Post vital signs: stable    Level of consciousness: awake, alert and oriented    Nausea/Vomiting: no nausea/vomiting    Complications: none    Transfer of care protocol was followed      Last vitals:   Visit Vitals  BP (!) 140/83   Pulse 91   Temp 36.5 °C (97.7 °F) (Temporal)   Resp 20   Ht 6' 3" (1.905 m)   Wt 87.5 kg (193 lb)   SpO2 100%   BMI 24.12 kg/m²     "

## 2018-12-31 NOTE — ANESTHESIA PREPROCEDURE EVALUATION
12/31/2018  Tr Chen is a 63 y.o., male.    Anesthesia Evaluation         Review of Systems  Anesthesia Hx:  No problems with previous Anesthesia   Social:  Smoker    Hematology/Oncology:        Current/Recent Cancer.   Cardiovascular:   Exercise tolerance: good Denies Hypertension.  Denies CAD.     Denies Angina.  Functional Capacity Can you climb two flights of stairs? ==> Yes    Pulmonary:   Denies Asthma.  Denies Recent URI.  Denies Sleep Apnea.    Renal/:  Renal/ Normal     Hepatic/GI:   Denies PUD. Denies Hiatal Hernia.  Denies GERD. Liver Disease,  Denies Hepatitis.    Neurological:   Denies CVA. Denies Seizures.    Endocrine:   Denies Diabetes. Denies Hypothyroidism.        Physical Exam  General:  Well nourished    Airway/Jaw/Neck:  Airway Findings: Mouth Opening: Normal Tongue: Normal  General Airway Assessment: Adult  Mallampati: III  Improves to II with phonation.  TM Distance: Normal, at least 6 cm  Jaw/Neck Findings:  Neck ROM: Normal ROM  Neck Findings:     Eyes/Ears/Nose:  EYES/EARS/NOSE FINDINGS: Normal   Dental:  Dental Findings: In tact, Upper Dentures, Lower Dentures    Chest/Lungs:  Chest/Lungs Findings: Clear to auscultation     Heart/Vascular:  Heart Findings: Rate: Normal  Rhythm: Regular Rhythm  Sounds: Normal        Mental Status:  Mental Status Findings:  Alert and Oriented         Anesthesia Plan  Type of Anesthesia, risks & benefits discussed:  Anesthesia Type:  general  Patient's Preference: Proceed with anesthesia understanding that the risks are very small but could be serious or life threatening.  Intra-op Monitoring Plan: standard ASA monitors  Intra-op Monitoring Plan Comments:   Post Op Pain Control Plan:   Post Op Pain Control Plan Comments:   Induction:   IV  Beta Blocker:  Patient is not currently on a Beta-Blocker (No further documentation required).        Informed Consent: Patient understands risks and agrees with Anesthesia plan.  Questions answered. Anesthesia consent signed with patient.  ASA Score: 2     Day of Surgery Review of History & Physical: I have interviewed and examined the patient. I have reviewed the patient's H&P dated:            Ready For Surgery From Anesthesia Perspective.

## 2018-12-31 NOTE — ANESTHESIA RELEASE NOTE
"Anesthesia Release from PACU Note    Patient: Tr Chen    Procedure(s) Performed: Procedure(s) (LRB):  Radiofrequency Ablation (N/A)    Anesthesia type: general    Post pain: Adequate analgesia    Post assessment: no apparent anesthetic complications    Last Vitals:   Visit Vitals  BP (!) 158/89 (BP Location: Left arm, Patient Position: Lying)   Pulse 75   Temp 36.5 °C (97.7 °F)   Resp 18   Ht 6' 3" (1.905 m)   Wt 87.5 kg (193 lb)   SpO2 97%   BMI 24.12 kg/m²       Post vital signs: stable    Level of consciousness: awake    Nausea/Vomiting: no nausea/no vomiting    Complications: none    Airway Patency: patent    Respiratory: unassisted    Cardiovascular: stable    Hydration: euvolemic  "

## 2019-01-07 DIAGNOSIS — C78.7 METASTATIC COLON CANCER TO LIVER: Primary | ICD-10-CM

## 2019-01-07 DIAGNOSIS — C18.9 METASTATIC COLON CANCER TO LIVER: Primary | ICD-10-CM

## 2019-01-17 ENCOUNTER — TELEPHONE (OUTPATIENT)
Dept: INTERVENTIONAL RADIOLOGY/VASCULAR | Facility: CLINIC | Age: 64
End: 2019-01-17

## 2019-01-17 NOTE — TELEPHONE ENCOUNTER
Left message for pt to return my call. Need to schedule 1 month follow up MRI/labs and IR clinic.  Please forward callto [phone].  Thanks

## 2019-01-25 ENCOUNTER — OFFICE VISIT (OUTPATIENT)
Dept: FAMILY MEDICINE | Facility: CLINIC | Age: 64
End: 2019-01-25
Payer: COMMERCIAL

## 2019-01-25 VITALS
BODY MASS INDEX: 23.63 KG/M2 | DIASTOLIC BLOOD PRESSURE: 88 MMHG | WEIGHT: 190.06 LBS | HEIGHT: 75 IN | TEMPERATURE: 98 F | SYSTOLIC BLOOD PRESSURE: 134 MMHG

## 2019-01-25 DIAGNOSIS — I10 ESSENTIAL HYPERTENSION: ICD-10-CM

## 2019-01-25 DIAGNOSIS — C78.7 METASTATIC COLON CANCER TO LIVER: Primary | ICD-10-CM

## 2019-01-25 DIAGNOSIS — M25.571 CHRONIC PAIN OF BOTH ANKLES: ICD-10-CM

## 2019-01-25 DIAGNOSIS — G89.29 CHRONIC PAIN OF BOTH ANKLES: ICD-10-CM

## 2019-01-25 DIAGNOSIS — G89.29 OTHER CHRONIC PAIN: ICD-10-CM

## 2019-01-25 DIAGNOSIS — M25.572 CHRONIC PAIN OF BOTH ANKLES: ICD-10-CM

## 2019-01-25 DIAGNOSIS — C18.9 METASTATIC COLON CANCER TO LIVER: Primary | ICD-10-CM

## 2019-01-25 DIAGNOSIS — Z12.5 SCREENING FOR PROSTATE CANCER: ICD-10-CM

## 2019-01-25 DIAGNOSIS — G89.3 CANCER RELATED PAIN: ICD-10-CM

## 2019-01-25 PROCEDURE — 99999 PR PBB SHADOW E&M-EST. PATIENT-LVL III: ICD-10-PCS | Mod: PBBFAC,,, | Performed by: INTERNAL MEDICINE

## 2019-01-25 PROCEDURE — 3075F PR MOST RECENT SYSTOLIC BLOOD PRESS GE 130-139MM HG: ICD-10-PCS | Mod: CPTII,S$GLB,, | Performed by: INTERNAL MEDICINE

## 2019-01-25 PROCEDURE — 3008F BODY MASS INDEX DOCD: CPT | Mod: CPTII,S$GLB,, | Performed by: INTERNAL MEDICINE

## 2019-01-25 PROCEDURE — 99214 OFFICE O/P EST MOD 30 MIN: CPT | Mod: S$GLB,,, | Performed by: INTERNAL MEDICINE

## 2019-01-25 PROCEDURE — 99214 PR OFFICE/OUTPT VISIT, EST, LEVL IV, 30-39 MIN: ICD-10-PCS | Mod: S$GLB,,, | Performed by: INTERNAL MEDICINE

## 2019-01-25 PROCEDURE — 3075F SYST BP GE 130 - 139MM HG: CPT | Mod: CPTII,S$GLB,, | Performed by: INTERNAL MEDICINE

## 2019-01-25 PROCEDURE — 3079F DIAST BP 80-89 MM HG: CPT | Mod: CPTII,S$GLB,, | Performed by: INTERNAL MEDICINE

## 2019-01-25 PROCEDURE — 99999 PR PBB SHADOW E&M-EST. PATIENT-LVL III: CPT | Mod: PBBFAC,,, | Performed by: INTERNAL MEDICINE

## 2019-01-25 PROCEDURE — 3079F PR MOST RECENT DIASTOLIC BLOOD PRESSURE 80-89 MM HG: ICD-10-PCS | Mod: CPTII,S$GLB,, | Performed by: INTERNAL MEDICINE

## 2019-01-25 PROCEDURE — 3008F PR BODY MASS INDEX (BMI) DOCUMENTED: ICD-10-PCS | Mod: CPTII,S$GLB,, | Performed by: INTERNAL MEDICINE

## 2019-01-25 RX ORDER — HYDROCODONE BITARTRATE AND ACETAMINOPHEN 5; 325 MG/1; MG/1
1 TABLET ORAL EVERY 6 HOURS PRN
Qty: 120 TABLET | Refills: 0 | Status: SHIPPED | OUTPATIENT
Start: 2019-03-25 | End: 2019-04-25

## 2019-01-25 RX ORDER — TADALAFIL 20 MG/1
20 TABLET ORAL
Qty: 12 TABLET | Refills: 11 | Status: SHIPPED | OUTPATIENT
Start: 2019-01-25 | End: 2019-05-23 | Stop reason: SDUPTHER

## 2019-01-25 RX ORDER — SILDENAFIL 100 MG/1
100 TABLET, FILM COATED ORAL DAILY PRN
Qty: 12 TABLET | Refills: 12 | Status: SHIPPED | OUTPATIENT
Start: 2019-01-25 | End: 2019-05-23 | Stop reason: SDUPTHER

## 2019-01-25 RX ORDER — ONDANSETRON 4 MG/1
4 TABLET, FILM COATED ORAL EVERY 6 HOURS PRN
Qty: 28 TABLET | Refills: 0 | Status: CANCELLED | OUTPATIENT
Start: 2019-01-25

## 2019-01-25 RX ORDER — HYDROCODONE BITARTRATE AND ACETAMINOPHEN 5; 325 MG/1; MG/1
1 TABLET ORAL EVERY 6 HOURS PRN
Qty: 120 TABLET | Refills: 0 | Status: SHIPPED | OUTPATIENT
Start: 2019-01-25 | End: 2019-05-23 | Stop reason: SDUPTHER

## 2019-01-25 RX ORDER — HYDROCODONE BITARTRATE AND ACETAMINOPHEN 5; 325 MG/1; MG/1
1 TABLET ORAL EVERY 6 HOURS PRN
Qty: 120 TABLET | Refills: 0 | Status: SHIPPED | OUTPATIENT
Start: 2019-02-25 | End: 2019-05-23 | Stop reason: SDUPTHER

## 2019-01-25 NOTE — PROGRESS NOTES
Chief complaint refills and follow-up from colon cancer,    63-year-old white male who finally did have his needed colonoscopy whereupon colon cancer was found.  He recently did have an ablation of a liver met which led him to have significant severe pain after the procedure.  He did have to use more of his hydrocodone.  He is here for his usual refills.  He is back to work although he had to be out for about 2 weeks.  We reviewed the procedures.  We reviewed that he probably does need to call Radiology to schedule a follow-up MRI.  He has labs and a follow-up in February with his oncologist and we will add a PSA which appears to be overdue.  He was counseled at length regarding the interval events.Total time over 25 minutes with over 50% counseling.         .18  Impression       Percutaneous microwave ablation of right hepatic lobe hypermetabolic lesion .    Plan:    Patient will follow-up in interventional radiology clinic in 1 month with labs and repeat MRI.             Review of systems: No fevers or chills, no other urinary complaints, no further GI complaints    PAST MEDICAL HISTORY:   1. ED.   2. HTN.   3. Smoker.   4. Allergic rhinitis with exposure to mold   5. Frequent bronchitis   6. Lumbar strain due to work on [employer]   7. Large colon polyp removed before age 50 - over 10-15 -yrs  8.  Chronic pain referable to ankle arthritis   9.  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 One younger brother with no medical problems    Vitals as above  Gen: no distress    Exam otherwise deferred,     Tr was seen today for medication refill.    Diagnoses and all orders for this visit:    Metastatic colon cancer to liver, hopefully treated with the ablation, discussed appropriate follow-up, appropriate  labs and he will arrange the MRI and so forth    Other chronic pain, controlled substance management and monitoring done    Cancer related pain    Essential hypertension, fair control    Chronic pain of both ankles    Erectile dysfunction, prescription management done and eventually he plans to possibly follow up with Urology since even these medications are only minimally effective.  All this is probably related to radiation treatment for the rectal cancer        Screening for prostate cancer, due  -     PSA, Screening; Future    Other orders  -     Cancel: Pneumococcal Polysaccharide Vaccine (23 Valent) (SQ/IM)  -     Cancel: Influenza - Quadrivalent (3 years & older) (PF)  -     HYDROcodone-acetaminophen (NORCO) 5-325 mg per tablet; Take 1 tablet by mouth every 6 (six) hours as needed for Pain.  -     tadalafil (CIALIS) 20 MG Tab; Take 1 tablet (20 mg total) by mouth every 36 (thirty-six) hours.  -     sildenafil (VIAGRA) 100 MG tablet; Take 1 tablet (100 mg total) by mouth daily as needed for Erectile Dysfunction. half -1 Tablet Oral .  Take 30 min before anticipated need.  -     Cancel: ondansetron (ZOFRAN) 4 MG tablet; Take 1 tablet (4 mg total) by mouth every 6 (six) hours as needed for Nausea.  -     HYDROcodone-acetaminophen (NORCO) 5-325 mg per tablet; Take 1 tablet by mouth every 6 (six) hours as needed for Pain.  -     HYDROcodone-acetaminophen (NORCO) 5-325 mg per tablet; Take 1 tablet by mouth every 6 (six) hours as needed for Pain.

## 2019-02-14 ENCOUNTER — HOSPITAL ENCOUNTER (OUTPATIENT)
Dept: RADIOLOGY | Facility: HOSPITAL | Age: 64
Discharge: HOME | End: 2019-02-14
Attending: FAMILY MEDICINE
Payer: COMMERCIAL

## 2019-02-14 DIAGNOSIS — C78.7 METASTATIC COLON CANCER TO LIVER: ICD-10-CM

## 2019-02-14 DIAGNOSIS — C18.9 METASTATIC COLON CANCER TO LIVER: ICD-10-CM

## 2019-02-14 PROCEDURE — 74183 MRI ABDOMEN W WO CONTRAST: ICD-10-PCS | Mod: 26,,, | Performed by: RADIOLOGY

## 2019-02-14 PROCEDURE — 74183 MRI ABD W/O CNTR FLWD CNTR: CPT | Mod: TC

## 2019-02-14 PROCEDURE — A9585 GADOBUTROL INJECTION: HCPCS | Performed by: FAMILY MEDICINE

## 2019-02-14 PROCEDURE — 25500020 PHARM REV CODE 255: Performed by: FAMILY MEDICINE

## 2019-02-14 PROCEDURE — 74183 MRI ABD W/O CNTR FLWD CNTR: CPT | Mod: 26,,, | Performed by: RADIOLOGY

## 2019-02-14 RX ORDER — GADOBUTROL 604.72 MG/ML
10 INJECTION INTRAVENOUS
Status: COMPLETED | OUTPATIENT
Start: 2019-02-14 | End: 2019-02-14

## 2019-02-14 RX ADMIN — GADOBUTROL 10 ML: 604.72 INJECTION INTRAVENOUS at 09:02

## 2019-02-18 ENCOUNTER — OFFICE VISIT (OUTPATIENT)
Dept: HEMATOLOGY/ONCOLOGY | Facility: CLINIC | Age: 64
End: 2019-02-18
Payer: COMMERCIAL

## 2019-02-18 ENCOUNTER — OFFICE VISIT (OUTPATIENT)
Dept: INTERVENTIONAL RADIOLOGY/VASCULAR | Facility: CLINIC | Age: 64
End: 2019-02-18
Payer: COMMERCIAL

## 2019-02-18 VITALS
TEMPERATURE: 99 F | WEIGHT: 184.06 LBS | BODY MASS INDEX: 23.62 KG/M2 | HEART RATE: 65 BPM | HEIGHT: 74 IN | SYSTOLIC BLOOD PRESSURE: 162 MMHG | OXYGEN SATURATION: 99 % | DIASTOLIC BLOOD PRESSURE: 58 MMHG

## 2019-02-18 VITALS
BODY MASS INDEX: 23.39 KG/M2 | WEIGHT: 188.13 LBS | HEART RATE: 72 BPM | DIASTOLIC BLOOD PRESSURE: 95 MMHG | HEIGHT: 75 IN | SYSTOLIC BLOOD PRESSURE: 182 MMHG

## 2019-02-18 DIAGNOSIS — C20 RECTAL CARCINOMA: Primary | ICD-10-CM

## 2019-02-18 DIAGNOSIS — C18.9 METASTATIC COLON CANCER TO LIVER: Primary | ICD-10-CM

## 2019-02-18 DIAGNOSIS — Z92.3 HISTORY OF RADIATION THERAPY: ICD-10-CM

## 2019-02-18 DIAGNOSIS — C78.7 METASTATIC COLON CANCER TO LIVER: Primary | ICD-10-CM

## 2019-02-18 DIAGNOSIS — C78.7 METASTASES TO THE LIVER: ICD-10-CM

## 2019-02-18 DIAGNOSIS — Z92.21 HISTORY OF CHEMOTHERAPY: ICD-10-CM

## 2019-02-18 PROCEDURE — 3078F PR MOST RECENT DIASTOLIC BLOOD PRESSURE < 80 MM HG: ICD-10-PCS | Mod: CPTII,S$GLB,, | Performed by: INTERNAL MEDICINE

## 2019-02-18 PROCEDURE — 99212 PR OFFICE/OUTPT VISIT, EST, LEVL II, 10-19 MIN: ICD-10-PCS | Mod: S$GLB,,, | Performed by: FAMILY MEDICINE

## 2019-02-18 PROCEDURE — 99999 PR PBB SHADOW E&M-EST. PATIENT-LVL III: CPT | Mod: PBBFAC,,, | Performed by: INTERNAL MEDICINE

## 2019-02-18 PROCEDURE — 99214 OFFICE O/P EST MOD 30 MIN: CPT | Mod: S$GLB,,, | Performed by: INTERNAL MEDICINE

## 2019-02-18 PROCEDURE — 99999 PR PBB SHADOW E&M-EST. PATIENT-LVL III: ICD-10-PCS | Mod: PBBFAC,,, | Performed by: FAMILY MEDICINE

## 2019-02-18 PROCEDURE — 3077F PR MOST RECENT SYSTOLIC BLOOD PRESSURE >= 140 MM HG: ICD-10-PCS | Mod: CPTII,S$GLB,, | Performed by: INTERNAL MEDICINE

## 2019-02-18 PROCEDURE — 3078F PR MOST RECENT DIASTOLIC BLOOD PRESSURE < 80 MM HG: ICD-10-PCS | Mod: CPTII,S$GLB,, | Performed by: FAMILY MEDICINE

## 2019-02-18 PROCEDURE — 99212 OFFICE O/P EST SF 10 MIN: CPT | Mod: S$GLB,,, | Performed by: FAMILY MEDICINE

## 2019-02-18 PROCEDURE — 3078F DIAST BP <80 MM HG: CPT | Mod: CPTII,S$GLB,, | Performed by: FAMILY MEDICINE

## 2019-02-18 PROCEDURE — 3077F PR MOST RECENT SYSTOLIC BLOOD PRESSURE >= 140 MM HG: ICD-10-PCS | Mod: CPTII,S$GLB,, | Performed by: FAMILY MEDICINE

## 2019-02-18 PROCEDURE — 3077F SYST BP >= 140 MM HG: CPT | Mod: CPTII,S$GLB,, | Performed by: INTERNAL MEDICINE

## 2019-02-18 PROCEDURE — 3078F DIAST BP <80 MM HG: CPT | Mod: CPTII,S$GLB,, | Performed by: INTERNAL MEDICINE

## 2019-02-18 PROCEDURE — 3008F BODY MASS INDEX DOCD: CPT | Mod: CPTII,S$GLB,, | Performed by: FAMILY MEDICINE

## 2019-02-18 PROCEDURE — 99214 PR OFFICE/OUTPT VISIT, EST, LEVL IV, 30-39 MIN: ICD-10-PCS | Mod: S$GLB,,, | Performed by: INTERNAL MEDICINE

## 2019-02-18 PROCEDURE — 3008F PR BODY MASS INDEX (BMI) DOCUMENTED: ICD-10-PCS | Mod: CPTII,S$GLB,, | Performed by: INTERNAL MEDICINE

## 2019-02-18 PROCEDURE — 3008F BODY MASS INDEX DOCD: CPT | Mod: CPTII,S$GLB,, | Performed by: INTERNAL MEDICINE

## 2019-02-18 PROCEDURE — 99999 PR PBB SHADOW E&M-EST. PATIENT-LVL III: ICD-10-PCS | Mod: PBBFAC,,, | Performed by: INTERNAL MEDICINE

## 2019-02-18 PROCEDURE — 3077F SYST BP >= 140 MM HG: CPT | Mod: CPTII,S$GLB,, | Performed by: FAMILY MEDICINE

## 2019-02-18 PROCEDURE — 3008F PR BODY MASS INDEX (BMI) DOCUMENTED: ICD-10-PCS | Mod: CPTII,S$GLB,, | Performed by: FAMILY MEDICINE

## 2019-02-18 PROCEDURE — 99999 PR PBB SHADOW E&M-EST. PATIENT-LVL III: CPT | Mod: PBBFAC,,, | Performed by: FAMILY MEDICINE

## 2019-02-18 NOTE — PROGRESS NOTES
Subjective:       Patient ID: Tr Chen is a 63 y.o. male.    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 CT imaging studies negative for evidence of distant disease.  1/27/2017 PET/CT imaging  reveals   hepatic metastatic lesion . He subsequently underwent CT guided liver bx 2/3/2017 - NEG for malignancy .     status post LAR,12/27/2016.pT3 pN1cM0 Stage IIIB  He is s/p adjuvant XELOX cycle 4  Completed 4/17/2017  Followed by chemo/RT initiated  5/22/2017   He completed XRT July 6, 2017  s/p partial completion of XELODA approx 6/21/2017 ( due to ASE)       PET scan on 8/16/2018 showed continued growth of this lesion to 3.8cm. LIver lesion was biopsied again on 10/18/2018. Pathology revealed adenocarcinoma compatible with a metastatic carcinoma of colorectal origin.       Pt delayed follow up imaging ( work related issues)  Recent PET/CT 12/18/2018 reveals  Isolated right lobe liver metastasis more metabolic from the prior study more prominent worrisome for progression and poor response to therapy.  No new lesions are seen.      Pt followed by IR   s/p ablation of liver lesion 12/31/2018    MRI abd w w/o contrast 2/14/2019 reveals interval right hepatic lobe ablation with no residual enhancement at site,no new concerning liver lesions noed     Today, he is nervous/anxious regarding potential MRI findings  He continues to  work full-time   Appetite and weight stable   No shortness of breath or chest pain  No abd pain   No N/V   No melena, hematochezia   He continues to smoke     Surveillance colonoscopy 12/27/2017- NEG      Prev Hx: Pt  with past medical history of colonic polyps, hypertension, tobacco abuse, seen  today in consultation for recently diagnosed rectosigmoid adenocarcinoma.  The patient reports intermittent rectal bleeding for the past 3 to 4 months.  He subsequently underwent a colonoscopy on 2016, which revealed polyps noted in the descending colon, sigmoid colon, and rectum and also noted for a malignant-appearing partially obstructing tumor in the distal sigmoid colon with ulceration. Pathology of the rectosigmoid mass revealed a tubulovillous adenoma and polyps benign..The patient reports he underwent a colonoscopy during his 40s for rectal bleeding.  He was diagnosed with a large bleeding polyp, for which he underwent removal.  He did not undergo surveillance colonoscopies as advised.  No family history of colon cancer.  CT of the abdomen and pelvis on 2016 revealed thickening of the rectal wall, findings, rectosigmoid neoplasm at the rectosigmoid junction and numerous hepatic lesions compatible with hepatic cysts. He  subsequently underwent a rectal sigmoidectomy on 2016 .  Pathology revealed an adenocarcinoma, low grade, clear margins. The patient underwent removal of 24 lymph nodes with 0 lymph nodes involved.  Evidence of tumor deposits noted with pathologic staging pT3, pN1c.      PAST MEDICAL HISTORY:  Erectile dysfunction, colonic polyps, hypertension, tobacco abuse disorder, chronic pain.    PAST SURGICAL HISTORY:  As above.    SOCIAL HISTORY:  He is a former smoker.  He has a greater than 40-pack-year.  He drinks socially.  He is employed as an  for [employer]  .    FAMILY HISTORY:  Mom diagnosed with breast cancer at age 61.  Dad  at age 87 from Alzheimer's.  He has one sibling, brother in overall good health.    Therapy:  s/p Xeloda  1500mg bid Xeloda 2 wks on 1 wk off / Oxaliplatin 130mg/m2 Day 1 q 21 d  X 4 completed 2017     S/p partial completion of concurrent chemo/RT completed                           S/p ablation of liver  "lesion 12/31/2018       Review of Systems   Constitutional: Negative for appetite change, fatigue, fever and unexpected weight change.   HENT: Negative for mouth sores and nosebleeds.    Eyes: Negative for visual disturbance.   Respiratory: Negative for cough and shortness of breath.    Cardiovascular: Negative for chest pain and leg swelling.   Gastrointestinal: Negative for abdominal pain, blood in stool, constipation, diarrhea, nausea and vomiting.   Genitourinary: Negative for frequency and hematuria.   Musculoskeletal: Positive for arthralgias. Negative for back pain.   Skin: Negative for rash.   Neurological: Negative for dizziness, weakness, light-headedness and headaches.   Hematological: Negative for adenopathy.       Objective:       Vitals:    02/18/19 0809 02/18/19 0810 02/18/19 0830   BP: (!) 186/86 ( nervous) (!) 170/80 (!) 162/58   BP Location: Left arm Left arm Left arm   Patient Position: Sitting Sitting Sitting   BP Method: Large (Automatic) Large (Manual) Large (Manual)   Pulse: 65     Temp: 98.5 °F (36.9 °C)     TempSrc: Oral     SpO2: 99%     Weight: 83.5 kg (184 lb 1.4 oz)     Height: 6' 2" (1.88 m)           Physical Exam   Constitutional: He is oriented to person, place, and time. He appears well-developed and well-nourished.   HENT:   Head: Normocephalic.   Mouth/Throat: Oropharynx is clear and moist. No oropharyngeal exudate.   Eyes: Conjunctivae are normal. No scleral icterus.   Neck: Normal range of motion. Neck supple. No thyromegaly present.   Cardiovascular: Normal rate, regular rhythm and normal heart sounds.   No murmur heard.  Pulmonary/Chest: Effort normal and breath sounds normal. He has no wheezes. He has no rales.   Abdominal: Soft. Bowel sounds are normal. He exhibits no distension and no mass. There is no hepatosplenomegaly. There is no tenderness. There is no rebound and no guarding.   Musculoskeletal: Normal range of motion. He exhibits no edema.   Lymphadenopathy:     He " has no cervical adenopathy.     He has no axillary adenopathy.        Right: No supraclavicular adenopathy present.        Left: No supraclavicular adenopathy present.   Neurological: He is alert and oriented to person, place, and time. No cranial nerve deficit.   Skin: No rash noted. No erythema.   Psychiatric: He has a normal mood and affect.       1/27/2017 PET/CT reveals   hepatic metastatic lesion    Pathology 2/03/2017  LIVER BIOPSIES, CORE BIOPSIES-BENIGN HEPATIC PARENCHYMA. THERE IS MILD MACRO VACUOLAR FATTY CHANGE AND MILD NONSPECIFIC FOCAL CHRONIC TRIADITIS. THERE IS NO EVIDENCE OF TUMOR OR GRANULOMATA PRESENT. A FOCAL AREA OF NONSPECIFIC FIBROSIS SUGGESTIVE OF SCAR TISSUE IS NOTED. THERE IS NO EVIDENCE OF MALIGNANCY.        CT a/p w/contrast 11/20/2017   1.  Status post sigmoid resection, no recurrent or metastatic disease.    2.  Centrilobular emphysema lungs.    3.  Liver cysts benign and stable.    4.  Nonobstructed left inferior gallstone.          Results for BRANDY OLIVA (MRN [id number]) as of 12/14/2018 16:07   Ref. Range 6/14/2018 07:57 7/26/2018 12:31 9/12/2018 12:01 10/12/2018 09:30 12/13/2018 09:50   CEA Latest Ref Range: 0.0 - 5.0 ng/mL 7.9 (H) 7.2 (H) 7.1 (H) 6.9 (H) 7.3 (H)     Results for BRANDY OLIVA (MRN [id number]) as of 2/18/2019 08:20   Ref. Range 2/14/2019 07:20   CEA Latest Ref Range: 0.0 - 5.0 ng/mL 6.9 (H)       CT c/a/p w/contrast 6/11/2018  There has been interval enlargement of a low-attenuation region in the region of the previously seen metastasis from PET scan of January 2017.  Repeat PET-CT for re-evaluation is recommended.    PET/CT 8/16/2018 Progression of hepatic metastatic disease in this patient with a history of rectal cancer status post low anterior resection.      Isolated right lobe liver metastasis more metabolic from the prior study more prominent worrisome for progression and poor response to therapy.  No new lesions are seen.    Index right lobe liver lesion  SUV max 12.86, previously 10.76.      MRI abd w w/o contrast 2/14/2019     Interval right hepatic lobe ablation.  No residual enhancement at this site.    No new concerning liver lesions identified, noting limited evaluation due to innumerable underlying cysts of varying sizes.  Continued CT PET imaging for future follow-up recommended.      Assessment:       1. Rectal carcinoma    2. Metastases to the liver    3. History of chemotherapy    4. 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    CT imaging  - negative for evidence of distant disease.     PET/CT imaging studies - hepatic lesion  S/p liver bx 2/3/2017  - benign   status post LAR,12/27/2016.pT3 pN1cM0 Stage IIIB  He is s/p adjuvant XELOX cycle 4  Completed 4/17/2017  Followed by chemo/RT initiated  5/22/2017   s/p partial completion of XELODA approx 6/21/2017 ( due to ASE)      Surveillance colonoscopy 12/27/2017- NEG  Follow-up C-scope per GI planned 12/2019   CT c/a/p w/contrast 6/11/2018 reveals  interval enlargement of a low-attenuation region in the region of the previously seen metastasis from PET scan of   Pt delayed follow-up CT ( work related issues)   PET/CT 8/16/2018 reveals Progression of hepatic metastatic disease      PET/CT 12/18/2018 reveals  Isolated right lobe liver metastasis more metabolic from the prior study more prominent worrisome for progression and poor response to therapy.  No new lesions are seen.    S/p ablation of liver lesion 12/31/2018   Follow-up MRI abd w w/out contrast 2/14/2019 reveals no areas of enhancement, no new liver lesions    Follow-up with IR planned today       Cont observation     All questions posed answered to patient's satisfaction       F/u  3 month with  CBC, CMP,CEA f/u imaging studies prior to f/u ( or sooner pending  f/u imaging results)             CC: ROSEANN Lai M.D., MD Craig Ehrensing, MD Juan Gimenez, MD

## 2019-02-18 NOTE — PROGRESS NOTES
Subjective:       Patient ID: Tr Chen is a 63 y.o. male.    Chief Complaint: Cancer    Patient here for follow up of hepatocellular carcinoma recently treated with microwave ablation on 12/31/2019. He reports feeling well. He denies any abdominal pain or distention. He had an MRI on 2/14/2019.      Review of Systems   Constitutional: Negative for activity change, appetite change, chills, fatigue and fever.   Respiratory: Negative for cough, shortness of breath, wheezing and stridor.    Cardiovascular: Negative for chest pain, palpitations and leg swelling.   Gastrointestinal: Negative for abdominal distention, abdominal pain, constipation, diarrhea, nausea and vomiting.       Objective:      Physical Exam   Constitutional: He is oriented to person, place, and time. He appears well-developed and well-nourished. No distress.   Cardiovascular: Normal rate, regular rhythm and normal heart sounds. Exam reveals no gallop and no friction rub.   No murmur heard.  Pulmonary/Chest: Effort normal and breath sounds normal. No stridor. No respiratory distress. He has no wheezes. He has no rales.   Abdominal: Soft. He exhibits no distension and no mass. There is no tenderness. There is no guarding.   Neurological: He is alert and oriented to person, place, and time.   Skin: Skin is warm and dry. He is not diaphoretic.   Psychiatric: He has a normal mood and affect.   Vitals reviewed.      Imaging:  MRI 2/14/2019  Impression       Interval right hepatic lobe ablation.  No residual enhancement at this site.    No new concerning liver lesions identified, noting limited evaluation due to innumerable underlying cysts of varying sizes.  Continued CT PET imaging for future follow-up recommended.     Assessment:       1. Metastatic colon cancer to liver        Plan:         Reviewed MRI with Dr. Krause. Discussed MRI impression with patient. Explained recommendation is to continue surveillance with oncology. Patient tells me he  will be scheduled for repeat MRI in about 3 months with his oncologist. Encouraged to keep that appointment. Patient verbalized understanding and agreement. Clinic phone number provided.

## 2019-04-25 ENCOUNTER — TELEPHONE (OUTPATIENT)
Dept: FAMILY MEDICINE | Facility: CLINIC | Age: 64
End: 2019-04-25

## 2019-04-25 RX ORDER — HYDROCODONE BITARTRATE AND ACETAMINOPHEN 5; 325 MG/1; MG/1
1 TABLET ORAL EVERY 6 HOURS PRN
Qty: 120 TABLET | Refills: 0 | Status: SHIPPED | OUTPATIENT
Start: 2019-04-25 | End: 2019-05-23 | Stop reason: SDUPTHER

## 2019-04-25 NOTE — TELEPHONE ENCOUNTER
----- Message from Zamzam Berg sent at 4/25/2019  1:26 PM CDT -----  Contact: Self   Pt calling to speak to a nurse regarding health. [phone]

## 2019-04-25 NOTE — TELEPHONE ENCOUNTER
He might be calling back about the prescription we said we were going to call in this month.  His hydrocodone was sent to the [facility]

## 2019-05-20 DIAGNOSIS — C18.9 METASTATIC COLON CANCER TO LIVER: Primary | ICD-10-CM

## 2019-05-20 DIAGNOSIS — C78.7 METASTATIC COLON CANCER TO LIVER: Primary | ICD-10-CM

## 2019-05-23 ENCOUNTER — OFFICE VISIT (OUTPATIENT)
Dept: FAMILY MEDICINE | Facility: CLINIC | Age: 64
End: 2019-05-23
Payer: COMMERCIAL

## 2019-05-23 VITALS
HEIGHT: 75 IN | SYSTOLIC BLOOD PRESSURE: 132 MMHG | BODY MASS INDEX: 23.66 KG/M2 | TEMPERATURE: 98 F | DIASTOLIC BLOOD PRESSURE: 86 MMHG | WEIGHT: 190.25 LBS | HEART RATE: 87 BPM | OXYGEN SATURATION: 95 %

## 2019-05-23 DIAGNOSIS — G89.3 CANCER RELATED PAIN: ICD-10-CM

## 2019-05-23 DIAGNOSIS — G89.29 OTHER CHRONIC PAIN: Primary | ICD-10-CM

## 2019-05-23 DIAGNOSIS — C18.9 METASTATIC COLON CANCER TO LIVER: ICD-10-CM

## 2019-05-23 DIAGNOSIS — I10 ESSENTIAL HYPERTENSION: ICD-10-CM

## 2019-05-23 DIAGNOSIS — Z12.5 SCREENING FOR PROSTATE CANCER: ICD-10-CM

## 2019-05-23 DIAGNOSIS — G89.29 CHRONIC PAIN OF BOTH ANKLES: ICD-10-CM

## 2019-05-23 DIAGNOSIS — M25.571 CHRONIC PAIN OF BOTH ANKLES: ICD-10-CM

## 2019-05-23 DIAGNOSIS — M25.572 CHRONIC PAIN OF BOTH ANKLES: ICD-10-CM

## 2019-05-23 DIAGNOSIS — N52.9 ERECTILE DYSFUNCTION, UNSPECIFIED ERECTILE DYSFUNCTION TYPE: ICD-10-CM

## 2019-05-23 DIAGNOSIS — C78.7 METASTATIC COLON CANCER TO LIVER: ICD-10-CM

## 2019-05-23 PROCEDURE — 3008F PR BODY MASS INDEX (BMI) DOCUMENTED: ICD-10-PCS | Mod: CPTII,S$GLB,, | Performed by: INTERNAL MEDICINE

## 2019-05-23 PROCEDURE — 99999 PR PBB SHADOW E&M-EST. PATIENT-LVL III: ICD-10-PCS | Mod: PBBFAC,,, | Performed by: INTERNAL MEDICINE

## 2019-05-23 PROCEDURE — 3075F SYST BP GE 130 - 139MM HG: CPT | Mod: CPTII,S$GLB,, | Performed by: INTERNAL MEDICINE

## 2019-05-23 PROCEDURE — 99999 PR PBB SHADOW E&M-EST. PATIENT-LVL III: CPT | Mod: PBBFAC,,, | Performed by: INTERNAL MEDICINE

## 2019-05-23 PROCEDURE — 3079F PR MOST RECENT DIASTOLIC BLOOD PRESSURE 80-89 MM HG: ICD-10-PCS | Mod: CPTII,S$GLB,, | Performed by: INTERNAL MEDICINE

## 2019-05-23 PROCEDURE — 99214 PR OFFICE/OUTPT VISIT, EST, LEVL IV, 30-39 MIN: ICD-10-PCS | Mod: S$GLB,,, | Performed by: INTERNAL MEDICINE

## 2019-05-23 PROCEDURE — 3075F PR MOST RECENT SYSTOLIC BLOOD PRESS GE 130-139MM HG: ICD-10-PCS | Mod: CPTII,S$GLB,, | Performed by: INTERNAL MEDICINE

## 2019-05-23 PROCEDURE — 99214 OFFICE O/P EST MOD 30 MIN: CPT | Mod: S$GLB,,, | Performed by: INTERNAL MEDICINE

## 2019-05-23 PROCEDURE — 3008F BODY MASS INDEX DOCD: CPT | Mod: CPTII,S$GLB,, | Performed by: INTERNAL MEDICINE

## 2019-05-23 PROCEDURE — 3079F DIAST BP 80-89 MM HG: CPT | Mod: CPTII,S$GLB,, | Performed by: INTERNAL MEDICINE

## 2019-05-23 RX ORDER — SILDENAFIL 100 MG/1
100 TABLET, FILM COATED ORAL DAILY PRN
Qty: 12 TABLET | Refills: 12 | Status: SHIPPED | OUTPATIENT
Start: 2019-05-23

## 2019-05-23 RX ORDER — TADALAFIL 20 MG/1
20 TABLET ORAL
Qty: 12 TABLET | Refills: 11 | Status: SHIPPED | OUTPATIENT
Start: 2019-05-23 | End: 2020-01-21 | Stop reason: SDUPTHER

## 2019-05-23 RX ORDER — HYDROCODONE BITARTRATE AND ACETAMINOPHEN 5; 325 MG/1; MG/1
1 TABLET ORAL EVERY 6 HOURS PRN
Qty: 120 TABLET | Refills: 0 | Status: SHIPPED | OUTPATIENT
Start: 2019-05-23 | End: 2019-08-26 | Stop reason: SDUPTHER

## 2019-05-23 RX ORDER — HYDROCODONE BITARTRATE AND ACETAMINOPHEN 5; 325 MG/1; MG/1
1 TABLET ORAL EVERY 6 HOURS PRN
Qty: 120 TABLET | Refills: 0 | Status: SHIPPED | OUTPATIENT
Start: 2019-07-23 | End: 2019-08-21 | Stop reason: SDUPTHER

## 2019-05-23 RX ORDER — HYDROCODONE BITARTRATE AND ACETAMINOPHEN 5; 325 MG/1; MG/1
1 TABLET ORAL EVERY 6 HOURS PRN
Qty: 120 TABLET | Refills: 0 | Status: SHIPPED | OUTPATIENT
Start: 2019-06-22 | End: 2019-08-26 | Stop reason: SDUPTHER

## 2019-05-23 NOTE — PROGRESS NOTES
Chief complaint refills and follow-up from colon cancer, discussed erectile dysfunction    64-year-old white male who finally did have his needed colonoscopy whereupon colon cancer was found.  He recently did have an ablation of a liver met which led him to have significant severe pain after the procedure.  He did have to use more of his hydrocodone.  He is here for his usual refills.  He is back to work although he had to be out for about 2 weeks.  We reviewed the procedures.  We reviewed the  follow-up MRI.  He had labs  in February with his oncologist and we added a PSA which appears to be ok.  He was counseled at length regarding the interval events.Total time over 25 minutes with over 50% counseling.  He needs a note sent to his work stating he still having follow-up appointments every 3 months or so and testing and I did that.  He also continues with erectile dysfunction.  He does get a partial erectile dysfunction with use of medications the still has problems and inability to have an orgasm.  We discussed seeing Urology we discussed options including shots and so forth and also to discuss with Urology the orgasm issue.  He had really no problems after surgery but then this occurred after the radiation and chemo we discussed the probable nerve damage issues.  Still catching up at work and trying to turn back vacation time so he will need to put off seeing Urology at this time.    MRI  Impression       Interval right hepatic lobe ablation.  No residual enhancement at this site.    No new concerning liver lesions identified, noting limited evaluation due to innumerable underlying cysts of varying sizes.  Continued CT PET imaging for future follow-up recommended.      Electronically signed by: Eduin Castillo MD  Date: 02/14/2019           Review of systems: No fevers or chills, no other urinary complaints, no further GI complaints    PAST MEDICAL HISTORY:   1. ED.   2. HTN.   3. Smoker.   4. Allergic rhinitis with  exposure to mold   5. Frequent bronchitis   6. Lumbar strain due to work on [employer]   7. Large colon polyp removed before age 50 - over 10-15 -yrs  8.  Chronic pain referable to ankle arthritis   9.  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 One younger brother with no medical problems    Vitals as above  Gen: no distress    Exam otherwise deferred,     Tr was seen today for medication refill.    Diagnoses and all orders for this visit:    Metastatic colon cancer to liver, hopefully treated with the ablation, discussed appropriate follow-up,    Other chronic pain, controlled substance management and monitoring done    Cancer related pain    Essential hypertension, fair control    Chronic pain of both ankles    Erectile dysfunction, prescription management done and eventually he plans to possibly follow up with Urology since even these medications are only minimally effective.  All this is probably related to radiation treatment for the rectal cancer

## 2019-06-03 ENCOUNTER — TELEPHONE (OUTPATIENT)
Dept: INTERVENTIONAL RADIOLOGY/VASCULAR | Facility: CLINIC | Age: 64
End: 2019-06-03

## 2019-06-03 NOTE — TELEPHONE ENCOUNTER
Left message for pt to return my call.  Need to schedule MRI follow up.  PLease forward call to [phone]. Thanks

## 2019-06-18 ENCOUNTER — TELEPHONE (OUTPATIENT)
Dept: INTERVENTIONAL RADIOLOGY/VASCULAR | Facility: CLINIC | Age: 64
End: 2019-06-18

## 2019-06-18 NOTE — TELEPHONE ENCOUNTER
Left message for pt to return my call.  Need to schedule follow up appt. Please forward call to [phone]. Letter sent out. Thanks

## 2019-08-12 ENCOUNTER — PATIENT OUTREACH (OUTPATIENT)
Dept: ADMINISTRATIVE | Facility: HOSPITAL | Age: 64
End: 2019-08-12

## 2019-08-12 DIAGNOSIS — I10 ESSENTIAL HYPERTENSION: Primary | ICD-10-CM

## 2019-08-21 NOTE — TELEPHONE ENCOUNTER
----- Message from Consuelo Santillan sent at 8/21/2019  3:02 PM CDT -----  Contact: Patient  Type: RX Refill Request    Who Called: Patient     Refill or New Rx: Refill    RX Name and Strength:HYDROcodone-acetaminophen (NORCO) 5-325 mg per tablet    How is the patient currently taking it? (ex. 1XDay):    Is this a 30 day or 90 day RX: 30    Preferred Pharmacy with phone number: [id number] - [place] - [address] AT [facility] [address] [phone] (Phone)  [phone] (Fax)        Local or Mail Order: Local     Ordering Provider: Dr. Bragg     Would the patient rather a call back or a response via [facility]? Call back     Best Call Back Number: [phone]    Additional Information:

## 2019-08-22 RX ORDER — HYDROCODONE BITARTRATE AND ACETAMINOPHEN 5; 325 MG/1; MG/1
1 TABLET ORAL EVERY 6 HOURS PRN
Qty: 120 TABLET | Refills: 0 | Status: SHIPPED | OUTPATIENT
Start: 2019-08-22 | End: 2019-08-22 | Stop reason: SDUPTHER

## 2019-08-22 RX ORDER — HYDROCODONE BITARTRATE AND ACETAMINOPHEN 5; 325 MG/1; MG/1
1 TABLET ORAL EVERY 6 HOURS PRN
Qty: 120 TABLET | Refills: 0 | Status: SHIPPED | OUTPATIENT
Start: 2019-08-22 | End: 2019-10-10 | Stop reason: SDUPTHER

## 2019-08-22 NOTE — TELEPHONE ENCOUNTER
----- Message from Lawrence Knight sent at 8/22/2019 11:39 AM CDT -----  Contact: phi with [facility]  Name of Who is Calling:oneida with [facility]      What is the request in detail:[facility] Calling in regards to HYDROcodone-acetaminophen (NORCO) 5-325 mg per tablet Rx must have if it  medical [place] on it to be fill.      Can the clinic reply by [facility]:      What Number to Call Back if not in [facility]:[phone]

## 2019-08-26 ENCOUNTER — OFFICE VISIT (OUTPATIENT)
Dept: FAMILY MEDICINE | Facility: CLINIC | Age: 64
End: 2019-08-26
Payer: COMMERCIAL

## 2019-08-26 VITALS
SYSTOLIC BLOOD PRESSURE: 136 MMHG | TEMPERATURE: 99 F | DIASTOLIC BLOOD PRESSURE: 82 MMHG | HEIGHT: 72 IN | HEART RATE: 85 BPM | WEIGHT: 187.38 LBS | OXYGEN SATURATION: 97 % | BODY MASS INDEX: 25.38 KG/M2

## 2019-08-26 DIAGNOSIS — G89.29 CHRONIC PAIN OF BOTH ANKLES: ICD-10-CM

## 2019-08-26 DIAGNOSIS — F52.32 ANORGASMIA OF MALE: ICD-10-CM

## 2019-08-26 DIAGNOSIS — C18.9 METASTATIC COLON CANCER TO LIVER: Primary | ICD-10-CM

## 2019-08-26 DIAGNOSIS — G89.29 OTHER CHRONIC PAIN: ICD-10-CM

## 2019-08-26 DIAGNOSIS — G89.3 CANCER RELATED PAIN: ICD-10-CM

## 2019-08-26 DIAGNOSIS — I10 ESSENTIAL HYPERTENSION: ICD-10-CM

## 2019-08-26 DIAGNOSIS — N52.9 ERECTILE DYSFUNCTION, UNSPECIFIED ERECTILE DYSFUNCTION TYPE: ICD-10-CM

## 2019-08-26 DIAGNOSIS — M25.572 CHRONIC PAIN OF BOTH ANKLES: ICD-10-CM

## 2019-08-26 DIAGNOSIS — C78.7 METASTATIC COLON CANCER TO LIVER: Primary | ICD-10-CM

## 2019-08-26 DIAGNOSIS — M25.571 CHRONIC PAIN OF BOTH ANKLES: ICD-10-CM

## 2019-08-26 PROCEDURE — 3008F PR BODY MASS INDEX (BMI) DOCUMENTED: ICD-10-PCS | Mod: CPTII,S$GLB,, | Performed by: INTERNAL MEDICINE

## 2019-08-26 PROCEDURE — 3075F PR MOST RECENT SYSTOLIC BLOOD PRESS GE 130-139MM HG: ICD-10-PCS | Mod: CPTII,S$GLB,, | Performed by: INTERNAL MEDICINE

## 2019-08-26 PROCEDURE — 99214 PR OFFICE/OUTPT VISIT, EST, LEVL IV, 30-39 MIN: ICD-10-PCS | Mod: S$GLB,,, | Performed by: INTERNAL MEDICINE

## 2019-08-26 PROCEDURE — 99214 OFFICE O/P EST MOD 30 MIN: CPT | Mod: S$GLB,,, | Performed by: INTERNAL MEDICINE

## 2019-08-26 PROCEDURE — 3079F DIAST BP 80-89 MM HG: CPT | Mod: CPTII,S$GLB,, | Performed by: INTERNAL MEDICINE

## 2019-08-26 PROCEDURE — 3079F PR MOST RECENT DIASTOLIC BLOOD PRESSURE 80-89 MM HG: ICD-10-PCS | Mod: CPTII,S$GLB,, | Performed by: INTERNAL MEDICINE

## 2019-08-26 PROCEDURE — 3075F SYST BP GE 130 - 139MM HG: CPT | Mod: CPTII,S$GLB,, | Performed by: INTERNAL MEDICINE

## 2019-08-26 PROCEDURE — 3008F BODY MASS INDEX DOCD: CPT | Mod: CPTII,S$GLB,, | Performed by: INTERNAL MEDICINE

## 2019-08-26 PROCEDURE — 99999 PR PBB SHADOW E&M-EST. PATIENT-LVL III: ICD-10-PCS | Mod: PBBFAC,,, | Performed by: INTERNAL MEDICINE

## 2019-08-26 PROCEDURE — 99999 PR PBB SHADOW E&M-EST. PATIENT-LVL III: CPT | Mod: PBBFAC,,, | Performed by: INTERNAL MEDICINE

## 2019-08-26 RX ORDER — HYDROCODONE BITARTRATE AND ACETAMINOPHEN 5; 325 MG/1; MG/1
1 TABLET ORAL EVERY 6 HOURS PRN
Qty: 120 TABLET | Refills: 0 | Status: SHIPPED | OUTPATIENT
Start: 2019-09-24 | End: 2020-01-21 | Stop reason: SDUPTHER

## 2019-08-26 RX ORDER — HYDROCODONE BITARTRATE AND ACETAMINOPHEN 5; 325 MG/1; MG/1
1 TABLET ORAL EVERY 6 HOURS PRN
Qty: 120 TABLET | Refills: 0 | Status: SHIPPED | OUTPATIENT
Start: 2019-10-24 | End: 2019-10-10 | Stop reason: SDUPTHER

## 2019-08-26 NOTE — PROGRESS NOTES
Chief complaint refills and follow-up from colon cancer, discussed erectile dysfunction    64-year-old white male who finally did have his needed colonoscopy whereupon colon cancer was found.  He recently did have an ablation of a liver met which led him to have significant severe pain after the procedure.  He did have to use more of his hydrocodone.  He is here for his usual refills.  He is back to work .  We reviewed the procedures.  We reviewed the  follow-up MRI.  He had labs  in February with his oncologist and we added a PSA which appears to be ok.  He was counseled at length regarding the interval events.Total time over 25 minutes with over 50% counseling.      He needs a note sent to his work stating he still having follow-up appointments every 3 months or so and testing and I did that.      He also continues with erectile dysfunction.  He does get a partial erectile dysfunction with use of medications the still has problems and inability to have an orgasm.  We discussed seeing Urology we discussed options including shots and so forth and also to discuss with Urology the orgasm issue.  He had really no problems after surgery but then this occurred after the radiation and chemo we discussed the probable nerve damage issues.  Still catching up at work and trying to turn back vacation time so he will need to put off seeing Urology at this time.  He would be interested in trying the injections but discussed with him that it sounds like his erections do respond to the oral medication and that is still primarily is in orgasm issue for which again he probably should discussed with Urology how that may have occurred and if there is any treatment.  He is not on any obvious medications.  We did discuss that his pain medications can reduced sensitivity but it appears he has tried even without any recent use of the pain medications without any improvement.    He will return in October and I schedule him an appointment  myself so that he can do his [place] paperwork and his prescriptions again for the end of the year.  He needs to schedule his follow-up MRI    MRI  Impression       Interval right hepatic lobe ablation.  No residual enhancement at this site.    No new concerning liver lesions identified, noting limited evaluation due to innumerable underlying cysts of varying sizes.  Continued CT PET imaging for future follow-up recommended.      Electronically signed by: Eduin Castillo MD  Date: 2019           Review of systems: No fevers or chills, no other urinary complaints, no further GI complaints    PAST MEDICAL HISTORY:   1. ED.   2. HTN.   3. Smoker.   4. Allergic rhinitis with exposure to mold   5. Frequent bronchitis   6. Lumbar strain due to work on [employer]   7. Large colon polyp removed before age 50 - over 10-15 -yrs  8.  Chronic pain referable to ankle arthritis   9.  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 One younger brother with no medical problems    Vitals as above  Gen: no distress    Exam otherwise deferred,         Metastatic colon cancer to liver, patient will call to schedule his MRI    Other chronic pain, controlled substance management and monitoring done, prescriptions adjusted    Chronic pain of both ankles, still working full-time    Cancer related pain    Essential hypertension, chronic and stable    Erectile dysfunction, unspecified erectile dysfunction type, response to medication, does need to discuss with Urology other specific issues    Anorgasmia of male    Other orders  -     HYDROcodone-acetaminophen (NORCO) 5-325 mg per tablet; Take 1 tablet by mouth every 6 (six) hours as needed for Pain. :MORE THAN A SEVEN DAY SUPPLY OF OPIOID IS MEDICALLY NECESSARY.  -      HYDROcodone-acetaminophen (NORCO) 5-325 mg per tablet; Take 1 tablet by mouth every 6 (six) hours as needed for Pain (Chronic pain, G89.4). :MORE THAN A SEVEN DAY SUPPLY OF OPIOID IS MEDICALLY NECESSARY.

## 2019-09-23 RX ORDER — HYDROCODONE BITARTRATE AND ACETAMINOPHEN 5; 325 MG/1; MG/1
1 TABLET ORAL EVERY 6 HOURS PRN
Qty: 120 TABLET | Refills: 0 | Status: CANCELLED | OUTPATIENT
Start: 2019-09-24

## 2019-09-23 NOTE — TELEPHONE ENCOUNTER
----- Message from Lelo Keenan sent at 9/23/2019  8:16 AM CDT -----  Contact: Amada Hernandez   Name of Who is Calling: Amada Hernandez       What is the request in detail: [facility] would likep ehr know if they could fill pt HYDROcodone-acetaminophen (NORCO) 5-325 mg per tablet. Today       Can the clinic reply by [facility]: n      What Number to Call Back if not in [facility]: [phone]

## 2019-09-23 NOTE — TELEPHONE ENCOUNTER
----- Message from Kelly Nicole sent at 9/23/2019  9:19 AM CDT -----  Contact: Self   Type: Patient Call Back    Who called: Self     What is the request in detail:patient says his script is at [facility] it's due to be filled tomorrow but patient states he will be gone out of town and was told by the pharmacy the doctor could call them for early approval.   HYDROcodone-acetaminophen (NORCO) 5-325 mg per tablet    Can the clinic reply by [facility]? No     Would the patient rather a call back or a response via [facility]?  Call     Best call back number:[phone]    Additional Information:[id number] - [place], [place] - [address] AT [facility] [address] [phone] (Phone)  [phone] (Fax)

## 2019-09-26 ENCOUNTER — PATIENT OUTREACH (OUTPATIENT)
Dept: ADMINISTRATIVE | Facility: HOSPITAL | Age: 64
End: 2019-09-26

## 2019-10-10 ENCOUNTER — OFFICE VISIT (OUTPATIENT)
Dept: FAMILY MEDICINE | Facility: CLINIC | Age: 64
End: 2019-10-10
Payer: COMMERCIAL

## 2019-10-10 VITALS
TEMPERATURE: 98 F | WEIGHT: 187.81 LBS | HEIGHT: 75 IN | OXYGEN SATURATION: 99 % | HEART RATE: 70 BPM | DIASTOLIC BLOOD PRESSURE: 88 MMHG | BODY MASS INDEX: 23.35 KG/M2 | SYSTOLIC BLOOD PRESSURE: 132 MMHG

## 2019-10-10 DIAGNOSIS — C78.7 METASTATIC COLON CANCER TO LIVER: Primary | ICD-10-CM

## 2019-10-10 DIAGNOSIS — G89.29 CHRONIC PAIN OF BOTH ANKLES: ICD-10-CM

## 2019-10-10 DIAGNOSIS — G89.29 OTHER CHRONIC PAIN: ICD-10-CM

## 2019-10-10 DIAGNOSIS — N52.9 ERECTILE DYSFUNCTION, UNSPECIFIED ERECTILE DYSFUNCTION TYPE: ICD-10-CM

## 2019-10-10 DIAGNOSIS — G89.3 CANCER RELATED PAIN: ICD-10-CM

## 2019-10-10 DIAGNOSIS — C18.9 METASTATIC COLON CANCER TO LIVER: Primary | ICD-10-CM

## 2019-10-10 DIAGNOSIS — M25.572 CHRONIC PAIN OF BOTH ANKLES: ICD-10-CM

## 2019-10-10 DIAGNOSIS — I10 ESSENTIAL HYPERTENSION: ICD-10-CM

## 2019-10-10 DIAGNOSIS — M25.571 CHRONIC PAIN OF BOTH ANKLES: ICD-10-CM

## 2019-10-10 PROCEDURE — 3079F PR MOST RECENT DIASTOLIC BLOOD PRESSURE 80-89 MM HG: ICD-10-PCS | Mod: CPTII,S$GLB,, | Performed by: INTERNAL MEDICINE

## 2019-10-10 PROCEDURE — 99214 OFFICE O/P EST MOD 30 MIN: CPT | Mod: S$GLB,,, | Performed by: INTERNAL MEDICINE

## 2019-10-10 PROCEDURE — 3075F PR MOST RECENT SYSTOLIC BLOOD PRESS GE 130-139MM HG: ICD-10-PCS | Mod: CPTII,S$GLB,, | Performed by: INTERNAL MEDICINE

## 2019-10-10 PROCEDURE — 3008F BODY MASS INDEX DOCD: CPT | Mod: CPTII,S$GLB,, | Performed by: INTERNAL MEDICINE

## 2019-10-10 PROCEDURE — 3008F PR BODY MASS INDEX (BMI) DOCUMENTED: ICD-10-PCS | Mod: CPTII,S$GLB,, | Performed by: INTERNAL MEDICINE

## 2019-10-10 PROCEDURE — 99214 PR OFFICE/OUTPT VISIT, EST, LEVL IV, 30-39 MIN: ICD-10-PCS | Mod: S$GLB,,, | Performed by: INTERNAL MEDICINE

## 2019-10-10 PROCEDURE — 3079F DIAST BP 80-89 MM HG: CPT | Mod: CPTII,S$GLB,, | Performed by: INTERNAL MEDICINE

## 2019-10-10 PROCEDURE — 99999 PR PBB SHADOW E&M-EST. PATIENT-LVL III: CPT | Mod: PBBFAC,,, | Performed by: INTERNAL MEDICINE

## 2019-10-10 PROCEDURE — 99999 PR PBB SHADOW E&M-EST. PATIENT-LVL III: ICD-10-PCS | Mod: PBBFAC,,, | Performed by: INTERNAL MEDICINE

## 2019-10-10 PROCEDURE — 3075F SYST BP GE 130 - 139MM HG: CPT | Mod: CPTII,S$GLB,, | Performed by: INTERNAL MEDICINE

## 2019-10-10 RX ORDER — HYDROCODONE BITARTRATE AND ACETAMINOPHEN 5; 325 MG/1; MG/1
1 TABLET ORAL EVERY 6 HOURS PRN
Qty: 120 TABLET | Refills: 0 | Status: SHIPPED | OUTPATIENT
Start: 2019-11-23 | End: 2020-01-21 | Stop reason: SDUPTHER

## 2019-10-10 RX ORDER — HYDROCODONE BITARTRATE AND ACETAMINOPHEN 5; 325 MG/1; MG/1
1 TABLET ORAL EVERY 6 HOURS PRN
Qty: 120 TABLET | Refills: 0 | Status: SHIPPED | OUTPATIENT
Start: 2019-12-23 | End: 2020-01-21 | Stop reason: SDUPTHER

## 2019-10-10 NOTE — PROGRESS NOTES
Chief complaint refills and follow-up from colon cancer, discussed erectile dysfunction    64-year-old white male who finally did have his needed colonoscopy whereupon colon cancer was found.  He did have an ablation of a liver met which led him to have significant severe pain after the procedure.  He did have to use more of his hydrocodone.  He is here for his usual refills.  He is back to work .  We reviewed the procedures.  We reviewed the  follow-up MRI.  He had labs  in February with his oncologist and we added a PSA which appears to be ok.  He knows he needs to schedule his follow-up MRI but still having scheduling issues, work issues.  He was counseled at length regarding the interval events.Total time over 25 minutes with over 50% counseling.      He needs a note sent to his work stating he still having follow-up appointments every 3 months or so and testing and I did that.  We also need to fill out his yearly FMLA form as it pertains to his colon cancer and his chronic arthritis and ankle pain. He has copies of the old report.  [employer] faxed us the new forms yesterday and we did have to search through over 6 in of fax is find the new form.  After that was done we were able to sit down and complete the forms.  He does intermittently miss work.  He may well have some future treatments for the cancer but that is yet to be determined.    He does need his controlled substance management and refills done for the next two months.  Will follow up with me at the beginning of next year.        Previous MRI  Impression       Interval right hepatic lobe ablation.  No residual enhancement at this site.    No new concerning liver lesions identified, noting limited evaluation due to innumerable underlying cysts of varying sizes.  Continued CT PET imaging for future follow-up recommended.      Electronically signed by: Eduin Castillo MD  Date: 02/14/2019           Review of systems: No fevers or chills, no other urinary  complaints, no further GI complaints    PAST MEDICAL HISTORY:   1. ED.   2. HTN.   3. Smoker.   4. Allergic rhinitis with exposure to mold   5. Frequent bronchitis   6. Lumbar strain due to work on [employer]   7. Large colon polyp removed before age 50 - over 10-15 -yrs  8.  Chronic pain referable to ankle arthritis   9.  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 One younger brother with no medical problems    Vitals as above  Gen: no distress    Exam otherwise deferred,       Tr was seen today for fmla.    Diagnoses and all orders for this visit:    Metastatic colon cancer to liver, encouraged patient to follow-up on the MRI, apparently has issues with the contrast as well as his schedule.    Other chronic pain, controlled substance management and monitoring done    Chronic pain of both ankles, intermittent source of pain    Cancer related pain    Essential hypertension, fair, needs to monitor, currently off meds    Erectile dysfunction, unspecified erectile dysfunction type, ongoing issue    Other orders  -     HYDROcodone-acetaminophen (NORCO) 5-325 mg per tablet; Take 1 tablet by mouth every 6 (six) hours as needed for Pain. :MORE THAN A SEVEN DAY SUPPLY OF OPIOID IS MEDICALLY NECESSARY.  -     HYDROcodone-acetaminophen (NORCO) 5-325 mg per tablet; Take 1 tablet by mouth every 6 (six) hours as needed for Pain.

## 2019-10-23 ENCOUNTER — TELEPHONE (OUTPATIENT)
Dept: FAMILY MEDICINE | Facility: CLINIC | Age: 64
End: 2019-10-23

## 2019-10-23 NOTE — TELEPHONE ENCOUNTER
Yes, okay to verbally say it is okay to get a day or early.  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

## 2019-10-23 NOTE — TELEPHONE ENCOUNTER
----- Message from Asia Chapman sent at 10/23/2019  9:00 AM CDT -----  Contact: [facility] phi  Type: Patient Call Back    Who called:[facility]    What is the request in detail:Pt want's to get his pain med today and is due for tomorrow. Lucy ok him to get it last month a day early so [facility] said she will have to call every month to ok it. Can some one give permission for future months if it's ok to keep doing a day early.    Can the clinic reply by [facility]?no    Would the patient rather a call back or a response via [facility]? call    Best call back number:

## 2020-01-21 ENCOUNTER — TELEPHONE (OUTPATIENT)
Dept: FAMILY MEDICINE | Facility: CLINIC | Age: 65
End: 2020-01-21

## 2020-01-21 ENCOUNTER — OFFICE VISIT (OUTPATIENT)
Dept: FAMILY MEDICINE | Facility: CLINIC | Age: 65
End: 2020-01-21
Payer: COMMERCIAL

## 2020-01-21 ENCOUNTER — LAB VISIT (OUTPATIENT)
Dept: LAB | Facility: HOSPITAL | Age: 65
End: 2020-01-21
Attending: INTERNAL MEDICINE
Payer: COMMERCIAL

## 2020-01-21 VITALS
DIASTOLIC BLOOD PRESSURE: 82 MMHG | HEART RATE: 75 BPM | WEIGHT: 189.38 LBS | SYSTOLIC BLOOD PRESSURE: 138 MMHG | OXYGEN SATURATION: 98 % | HEIGHT: 75 IN | TEMPERATURE: 99 F | BODY MASS INDEX: 23.55 KG/M2

## 2020-01-21 DIAGNOSIS — G89.3 CANCER RELATED PAIN: ICD-10-CM

## 2020-01-21 DIAGNOSIS — I10 ESSENTIAL HYPERTENSION: ICD-10-CM

## 2020-01-21 DIAGNOSIS — M25.572 CHRONIC PAIN OF BOTH ANKLES: ICD-10-CM

## 2020-01-21 DIAGNOSIS — C18.9 METASTATIC COLON CANCER TO LIVER: Primary | ICD-10-CM

## 2020-01-21 DIAGNOSIS — C18.9 METASTATIC COLON CANCER TO LIVER: ICD-10-CM

## 2020-01-21 DIAGNOSIS — G89.29 OTHER CHRONIC PAIN: ICD-10-CM

## 2020-01-21 DIAGNOSIS — M25.571 CHRONIC PAIN OF BOTH ANKLES: ICD-10-CM

## 2020-01-21 DIAGNOSIS — N52.9 ERECTILE DYSFUNCTION, UNSPECIFIED ERECTILE DYSFUNCTION TYPE: ICD-10-CM

## 2020-01-21 DIAGNOSIS — F52.32 ANORGASMIA OF MALE: ICD-10-CM

## 2020-01-21 DIAGNOSIS — F11.20 OPIATE DEPENDENCE, CONTINUOUS: ICD-10-CM

## 2020-01-21 DIAGNOSIS — G89.29 CHRONIC PAIN OF BOTH ANKLES: ICD-10-CM

## 2020-01-21 DIAGNOSIS — C78.7 METASTATIC COLON CANCER TO LIVER: ICD-10-CM

## 2020-01-21 DIAGNOSIS — C78.7 METASTATIC COLON CANCER TO LIVER: Primary | ICD-10-CM

## 2020-01-21 LAB
ALBUMIN SERPL BCP-MCNC: 3.6 G/DL (ref 3.5–5.2)
ALP SERPL-CCNC: 80 U/L (ref 55–135)
ALT SERPL W/O P-5'-P-CCNC: 14 U/L (ref 10–44)
ANION GAP SERPL CALC-SCNC: 7 MMOL/L (ref 8–16)
AST SERPL-CCNC: 20 U/L (ref 10–40)
BASOPHILS # BLD AUTO: 0.04 K/UL (ref 0–0.2)
BASOPHILS NFR BLD: 0.8 % (ref 0–1.9)
BILIRUB SERPL-MCNC: 0.6 MG/DL (ref 0.1–1)
BUN SERPL-MCNC: 7 MG/DL (ref 8–23)
CALCIUM SERPL-MCNC: 8.8 MG/DL (ref 8.7–10.5)
CEA SERPL-MCNC: 7.1 NG/ML (ref 0–5)
CHLORIDE SERPL-SCNC: 105 MMOL/L (ref 95–110)
CHOLEST SERPL-MCNC: 145 MG/DL (ref 120–199)
CHOLEST/HDLC SERPL: 3.5 {RATIO} (ref 2–5)
CO2 SERPL-SCNC: 26 MMOL/L (ref 23–29)
COMPLEXED PSA SERPL-MCNC: 1.7 NG/ML (ref 0–4)
CREAT SERPL-MCNC: 0.9 MG/DL (ref 0.5–1.4)
DIFFERENTIAL METHOD: ABNORMAL
EOSINOPHIL # BLD AUTO: 0.2 K/UL (ref 0–0.5)
EOSINOPHIL NFR BLD: 3 % (ref 0–8)
ERYTHROCYTE [DISTWIDTH] IN BLOOD BY AUTOMATED COUNT: 12.8 % (ref 11.5–14.5)
EST. GFR  (AFRICAN AMERICAN): >60 ML/MIN/1.73 M^2
EST. GFR  (NON AFRICAN AMERICAN): >60 ML/MIN/1.73 M^2
GLUCOSE SERPL-MCNC: 97 MG/DL (ref 70–110)
HCT VFR BLD AUTO: 45.3 % (ref 40–54)
HDLC SERPL-MCNC: 42 MG/DL (ref 40–75)
HDLC SERPL: 29 % (ref 20–50)
HGB BLD-MCNC: 14.5 G/DL (ref 14–18)
IMM GRANULOCYTES # BLD AUTO: 0.03 K/UL (ref 0–0.04)
IMM GRANULOCYTES NFR BLD AUTO: 0.6 % (ref 0–0.5)
LDLC SERPL CALC-MCNC: 83.6 MG/DL (ref 63–159)
LYMPHOCYTES # BLD AUTO: 0.9 K/UL (ref 1–4.8)
LYMPHOCYTES NFR BLD: 18.4 % (ref 18–48)
MCH RBC QN AUTO: 32.6 PG (ref 27–31)
MCHC RBC AUTO-ENTMCNC: 32 G/DL (ref 32–36)
MCV RBC AUTO: 102 FL (ref 82–98)
MONOCYTES # BLD AUTO: 0.6 K/UL (ref 0.3–1)
MONOCYTES NFR BLD: 12.1 % (ref 4–15)
NEUTROPHILS # BLD AUTO: 3.3 K/UL (ref 1.8–7.7)
NEUTROPHILS NFR BLD: 65.1 % (ref 38–73)
NONHDLC SERPL-MCNC: 103 MG/DL
NRBC BLD-RTO: 0 /100 WBC
PLATELET # BLD AUTO: 279 K/UL (ref 150–350)
PMV BLD AUTO: 9.5 FL (ref 9.2–12.9)
POTASSIUM SERPL-SCNC: 4.3 MMOL/L (ref 3.5–5.1)
PROT SERPL-MCNC: 7.2 G/DL (ref 6–8.4)
RBC # BLD AUTO: 4.45 M/UL (ref 4.6–6.2)
SODIUM SERPL-SCNC: 138 MMOL/L (ref 136–145)
TRIGL SERPL-MCNC: 97 MG/DL (ref 30–150)
TSH SERPL DL<=0.005 MIU/L-ACNC: 1.58 UIU/ML (ref 0.4–4)
VIT B12 SERPL-MCNC: 242 PG/ML (ref 210–950)
WBC # BLD AUTO: 5.05 K/UL (ref 3.9–12.7)

## 2020-01-21 PROCEDURE — 3079F DIAST BP 80-89 MM HG: CPT | Mod: CPTII,S$GLB,, | Performed by: INTERNAL MEDICINE

## 2020-01-21 PROCEDURE — 99214 OFFICE O/P EST MOD 30 MIN: CPT | Mod: S$GLB,,, | Performed by: INTERNAL MEDICINE

## 2020-01-21 PROCEDURE — 3008F PR BODY MASS INDEX (BMI) DOCUMENTED: ICD-10-PCS | Mod: CPTII,S$GLB,, | Performed by: INTERNAL MEDICINE

## 2020-01-21 PROCEDURE — 3079F PR MOST RECENT DIASTOLIC BLOOD PRESSURE 80-89 MM HG: ICD-10-PCS | Mod: CPTII,S$GLB,, | Performed by: INTERNAL MEDICINE

## 2020-01-21 PROCEDURE — 99999 PR PBB SHADOW E&M-EST. PATIENT-LVL III: ICD-10-PCS | Mod: PBBFAC,,, | Performed by: INTERNAL MEDICINE

## 2020-01-21 PROCEDURE — 82607 VITAMIN B-12: CPT

## 2020-01-21 PROCEDURE — 85025 COMPLETE CBC W/AUTO DIFF WBC: CPT

## 2020-01-21 PROCEDURE — 82378 CARCINOEMBRYONIC ANTIGEN: CPT

## 2020-01-21 PROCEDURE — 3075F PR MOST RECENT SYSTOLIC BLOOD PRESS GE 130-139MM HG: ICD-10-PCS | Mod: CPTII,S$GLB,, | Performed by: INTERNAL MEDICINE

## 2020-01-21 PROCEDURE — 84443 ASSAY THYROID STIM HORMONE: CPT

## 2020-01-21 PROCEDURE — 84153 ASSAY OF PSA TOTAL: CPT

## 2020-01-21 PROCEDURE — 99214 PR OFFICE/OUTPT VISIT, EST, LEVL IV, 30-39 MIN: ICD-10-PCS | Mod: S$GLB,,, | Performed by: INTERNAL MEDICINE

## 2020-01-21 PROCEDURE — 36415 COLL VENOUS BLD VENIPUNCTURE: CPT | Mod: PO

## 2020-01-21 PROCEDURE — 99999 PR PBB SHADOW E&M-EST. PATIENT-LVL III: CPT | Mod: PBBFAC,,, | Performed by: INTERNAL MEDICINE

## 2020-01-21 PROCEDURE — 80053 COMPREHEN METABOLIC PANEL: CPT

## 2020-01-21 PROCEDURE — 3075F SYST BP GE 130 - 139MM HG: CPT | Mod: CPTII,S$GLB,, | Performed by: INTERNAL MEDICINE

## 2020-01-21 PROCEDURE — 80061 LIPID PANEL: CPT

## 2020-01-21 PROCEDURE — 3008F BODY MASS INDEX DOCD: CPT | Mod: CPTII,S$GLB,, | Performed by: INTERNAL MEDICINE

## 2020-01-21 RX ORDER — HYDROCODONE BITARTRATE AND ACETAMINOPHEN 5; 325 MG/1; MG/1
1 TABLET ORAL EVERY 6 HOURS PRN
Qty: 120 TABLET | Refills: 0 | Status: SHIPPED | OUTPATIENT
Start: 2020-02-21 | End: 2020-04-21 | Stop reason: SDUPTHER

## 2020-01-21 RX ORDER — HYDROCODONE BITARTRATE AND ACETAMINOPHEN 5; 325 MG/1; MG/1
1 TABLET ORAL EVERY 6 HOURS PRN
Qty: 120 TABLET | Refills: 0 | Status: SHIPPED | OUTPATIENT
Start: 2020-01-21 | End: 2020-04-21 | Stop reason: SDUPTHER

## 2020-01-21 RX ORDER — TADALAFIL 20 MG/1
20 TABLET ORAL
Qty: 12 TABLET | Refills: 11 | Status: SHIPPED | OUTPATIENT
Start: 2020-01-21 | End: 2021-01-29

## 2020-01-21 RX ORDER — HYDROCODONE BITARTRATE AND ACETAMINOPHEN 5; 325 MG/1; MG/1
1 TABLET ORAL EVERY 6 HOURS PRN
Qty: 120 TABLET | Refills: 0 | Status: SHIPPED | OUTPATIENT
Start: 2020-03-21 | End: 2020-04-21 | Stop reason: SDUPTHER

## 2020-01-21 NOTE — PROGRESS NOTES
Chief complaint refills and follow-up from colon cancer, discussed erectile dysfunction    64-year-old white male who finally did have his needed colonoscopy whereupon colon cancer was found.  He did have an ablation of a liver met which led him to have significant severe pain after the procedure.  He did have to use more of his hydrocodone.  He is here for his usual refills.  He is back to work .  We reviewed the procedures.  We reviewed the  follow-up MRI.  He had labs  in February with his oncologist and we added a PSA which appears to be ok.  He knows he needs to schedule his follow-up MRI but still having scheduling issues, work issues.  He was counseled at length regarding the interval events.Total time over 25 minutes with over 50% counseling.       he is very busy at work.  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  He is willing to see Urology for his erectile dysfunction which does respond to medications partially still has issues with the orgasm so.  He has not had an orgasm since his radiation treatment and he is fairly certain this may be related to his radiation treatment.  His sexual function is extremely important to him and possibly even more important than his cancer treatment.  If he had known this issue would be related to the radiation treatment he may well not have pursued it.  He is willing to go see Urology at [facility].    He does need his controlled substance management and refills done for the next two months.    Reviewed his labs.  Essentially is labs are due this time of year and so will update all of those.  His MCV was 100, will check a B12 level        Previous MRI  Impression       Interval right hepatic lobe ablation.  No residual enhancement at this site.    No new concerning liver lesions identified, noting limited evaluation due to innumerable underlying cysts of varying sizes.  Continued CT PET imaging  for future follow-up recommended.      Electronically signed by: Eduin Castillo MD  Date: 2019           Review of systems: No fevers or chills, no other urinary complaints, no further GI complaints    PAST MEDICAL HISTORY:   1. ED.   2. HTN.   3. Smoker.   4. Allergic rhinitis with exposure to mold   5. Frequent bronchitis   6. Lumbar strain due to work on [employer]   7. Large colon polyp removed before age 50 - over 10-15 -yrs  8.  Chronic pain referable to ankle arthritis   9.  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 One younger brother with no medical problems    Vitals as above  Gen: no distress    Exam otherwise deferred,       Tr was seen today for medication refill.    Diagnoses and all orders for this visit:    Metastatic colon cancer to liver, patient will call Oncology to get his MRI set up when he is able to do so with his work schedule  -     Comprehensive metabolic panel; Future  -     PSA, Screening; Future  -     CBC auto differential; Future  -     TSH; Future  -     Lipid panel; Future  -     CEA; Future  -     Vitamin B12; Future    Other chronic pain, controlled substance management and monitoring done    Chronic pain of both ankles    Cancer related pain    Essential hypertension, fair control    Erectile dysfunction, unspecified erectile dysfunction type  -     Ambulatory consult to Urology    Anorgasmia of male  -     Ambulatory consult to Urology    Opiate dependence, continuous    Other orders  -     HYDROcodone-acetaminophen (NORCO) 5-325 mg per tablet; Take 1 tablet by mouth every 6 (six) hours as needed for Pain (Chronic pain, G89.4). :MORE THAN A SEVEN DAY SUPPLY OF OPIOID IS MEDICALLY NECESSARY.  -     HYDROcodone-acetaminophen (NORCO) 5-325 mg per tablet; Take 1 tablet by  mouth every 6 (six) hours as needed for Pain. :MORE THAN A SEVEN DAY SUPPLY OF OPIOID IS MEDICALLY NECESSARY.  -     HYDROcodone-acetaminophen (NORCO) 5-325 mg per tablet; Take 1 tablet by mouth every 6 (six) hours as needed for Pain.  -     tadalafil (CIALIS) 20 MG Tab; Take 1 tablet (20 mg total) by mouth every 36 (thirty-six) hours.

## 2020-02-17 ENCOUNTER — PATIENT OUTREACH (OUTPATIENT)
Dept: ADMINISTRATIVE | Facility: OTHER | Age: 65
End: 2020-02-17

## 2020-02-20 ENCOUNTER — TELEPHONE (OUTPATIENT)
Dept: FAMILY MEDICINE | Facility: CLINIC | Age: 65
End: 2020-02-20

## 2020-02-20 NOTE — TELEPHONE ENCOUNTER
----- Message from Consuelo Santillan sent at 2/20/2020  9:13 AM CST -----  Contact: Megan Sampson  Type:  Pharmacy Calling to Clarify an RX    Name of Caller: Megan Sampson    [facility]    Prescription Name:  HYDROcodone-acetaminophen (NORCO) 5-325 mg per tablet    What do they need to clarify? Requesting to release prescription today     Can you be contacted via [facility]?     Best Call Back Number: [phone]

## 2020-02-20 NOTE — TELEPHONE ENCOUNTER
----- Message from Daisha Alvarado sent at 2/20/2020 11:29 AM CST -----  Contact: Megan Sampson [phone]  Contact: Megan Sampson  Type:  Pharmacy Calling to Clarify an RX     Name of Caller: Megan Sampson     [facility] Name:  [facility]     Prescription Name:  HYDROcodone-acetaminophen (NORCO) 5-325 mg per tablet     What do they need to clarify? Requesting to release prescription today      Can you be contacted via [facility]?      Best Call Back Number: [phone]

## 2020-02-24 ENCOUNTER — PATIENT OUTREACH (OUTPATIENT)
Dept: ADMINISTRATIVE | Facility: OTHER | Age: 65
End: 2020-02-24

## 2020-02-26 ENCOUNTER — OFFICE VISIT (OUTPATIENT)
Dept: UROLOGY | Facility: CLINIC | Age: 65
End: 2020-02-26
Payer: COMMERCIAL

## 2020-02-26 VITALS
SYSTOLIC BLOOD PRESSURE: 188 MMHG | WEIGHT: 190.06 LBS | HEART RATE: 78 BPM | DIASTOLIC BLOOD PRESSURE: 87 MMHG | HEIGHT: 75 IN | BODY MASS INDEX: 23.63 KG/M2

## 2020-02-26 DIAGNOSIS — N40.1 BPH WITH URINARY OBSTRUCTION: ICD-10-CM

## 2020-02-26 DIAGNOSIS — I10 ESSENTIAL HYPERTENSION: ICD-10-CM

## 2020-02-26 DIAGNOSIS — N52.01 ERECTILE DYSFUNCTION DUE TO ARTERIAL INSUFFICIENCY: Primary | ICD-10-CM

## 2020-02-26 DIAGNOSIS — N13.8 BPH WITH URINARY OBSTRUCTION: ICD-10-CM

## 2020-02-26 PROBLEM — Z92.21 HISTORY OF CHEMOTHERAPY: Status: RESOLVED | Noted: 2017-11-28 | Resolved: 2020-02-26

## 2020-02-26 PROCEDURE — 99214 PR OFFICE/OUTPT VISIT, EST, LEVL IV, 30-39 MIN: ICD-10-PCS | Mod: S$GLB,,, | Performed by: UROLOGY

## 2020-02-26 PROCEDURE — 99999 PR PBB SHADOW E&M-EST. PATIENT-LVL III: CPT | Mod: PBBFAC,,, | Performed by: UROLOGY

## 2020-02-26 PROCEDURE — 99214 OFFICE O/P EST MOD 30 MIN: CPT | Mod: S$GLB,,, | Performed by: UROLOGY

## 2020-02-26 PROCEDURE — 99999 PR PBB SHADOW E&M-EST. PATIENT-LVL III: ICD-10-PCS | Mod: PBBFAC,,, | Performed by: UROLOGY

## 2020-02-26 RX ORDER — PAPAVERINE HYDROCHLORIDE 30 MG/ML
INJECTION INTRAMUSCULAR; INTRAVENOUS
Qty: 5 ML | Refills: 0 | Status: SHIPPED | OUTPATIENT
Start: 2020-02-26 | End: 2020-03-09 | Stop reason: ALTCHOICE

## 2020-02-26 NOTE — PROGRESS NOTES
CHIEF COMPLAINT:    Mr. Chen is a 65 y.o. male presenting for a consultation at the request of Dr. Bragg. Patient presents with ED.    PRESENTING ILLNESS:    Tr Chen is a 65 y.o. male who c/o ED.  This has been present since XRT for colon cancer.  This has been present for > 1 year.  He's tried Viagra and Cialis.  His T is normal.    He also c/o difficulty achieving an orgasm.  He is on chronic opiods for pain.    He has nocturia x 2.  Good FOS.  No hesitancy.  No urgency.  No hematuria.  No dysuria.    REVIEW OF SYSTEMS:    Tr Chen denies headache, blurred vision, fever, nausea, vomiting, chills, abdominal pain, chest pain, sore throat, bleeding per rectum, cough, SOB, recent loss of consciousness, recent mental status changes, seizures, dizziness, or upper or lower extremity weakness.    KONSTANTIN  1. 1  2. 1  3. 1  4. 1  5. 1      PATIENT HISTORY:    Past Medical History:   Diagnosis Date    Cancer     metastatic colon cancer    Chronic heel pain     Bilateral    Chronic pain 11/20/2012    Erectile dysfunction 5/16/2014    History of chemotherapy 11/28/2017    History of colonic polyps 7/16/2012    HTN (hypertension) 7/16/2012    Hypertension     Liver lesion     October biopsy showed liver mets    Tobacco use disorder 1/19/2015       Past Surgical History:   Procedure Laterality Date    COLON SURGERY  12/27/2016    colon resection    COLONOSCOPY N/A 11/21/2016    Procedure: COLONOSCOPY;  Surgeon: Jono Resendez MD;  Location: [facility] ENDO;  Service: Endoscopy;  Laterality: N/A;  needs scope done 2/24/16  -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     COLONOSCOPY N/A 12/27/2017    Procedure: COLONOSCOPY;  Surgeon: Jono Resendez MD;  Location: [facility] ENDO;  Service: Endoscopy;  Laterality: N/A;    FRACTURE SURGERY      both feet    LIVER BIOPSY N/A 10/18/2018    Procedure: BIOPSY, LIVER;  Surgeon: Zaire Diagnostic Provider;  Location: [facility] OR;  Service: Radiology;  Laterality: N/A;  9AM START  RN  PREOP 10/12/2018    TONSILLECTOMY         Family History   Problem Relation Age of Onset    Cancer Mother         breast       Social History     Socioeconomic History    Marital status:      Spouse name: Not on file    Number of children: Not on file    Years of education: Not on file    Highest education level: Not on file   Occupational History    Not on file   Social Needs    Financial resource strain: Not on file    Food insecurity:     Worry: Not on file     Inability: Not on file    Transportation needs:     Medical: Not on file     Non-medical: Not on file   Tobacco Use    Smoking status: Current Every Day Smoker     Packs/day: 0.50     Years: 30.00     Pack years: 15.00     Types: Cigarettes    Smokeless tobacco: Never Used   Substance and Sexual Activity    Alcohol use: Yes     Comment: social    Drug use: No    Sexual activity: Not on file   Lifestyle    Physical activity:     Days per week: Not on file     Minutes per session: Not on file    Stress: Not on file   Relationships    Social connections:     Talks on phone: Not on file     Gets together: Not on file     Attends Christianity service: Not on file     Active member of club or organization: Not on file     Attends meetings of clubs or organizations: Not on file     Relationship status: Not on file   Other Topics Concern    Not on file   Social History Narrative    Not on file       Allergies:  No known allergies    Medications:    Current Outpatient Medications:     [START ON 3/21/2020] HYDROcodone-acetaminophen (NORCO) 5-325 mg per tablet, Take 1 tablet by mouth every 6 (six) hours as needed for Pain (Chronic pain, G89.4). :MORE THAN A SEVEN DAY SUPPLY OF OPIOID IS MEDICALLY NECESSARY., Disp: 120 tablet, Rfl: 0    HYDROcodone-acetaminophen (NORCO) 5-325 mg per tablet, Take 1 tablet by mouth every 6 (six) hours as needed for Pain. :MORE THAN A SEVEN DAY SUPPLY OF OPIOID IS MEDICALLY NECESSARY., Disp: 120 tablet, Rfl:  0    HYDROcodone-acetaminophen (NORCO) 5-325 mg per tablet, Take 1 tablet by mouth every 6 (six) hours as needed for Pain., Disp: 120 tablet, Rfl: 0    ondansetron (ZOFRAN) 4 MG tablet, Take 1 tablet (4 mg total) by mouth every 6 (six) hours as needed for Nausea., Disp: 28 tablet, Rfl: 0    sildenafil (VIAGRA) 100 MG tablet, Take 1 tablet (100 mg total) by mouth daily as needed for Erectile Dysfunction. half -1 Tablet Oral .  Take 30 min before anticipated need., Disp: 12 tablet, Rfl: 12    tadalafil (CIALIS) 20 MG Tab, Take 1 tablet (20 mg total) by mouth every 36 (thirty-six) hours., Disp: 12 tablet, Rfl: 11    PHYSICAL EXAMINATION:    The patient generally appears in good health, is appropriately interactive, and is in no apparent distress.     Eyes: anicteric sclerae, moist conjunctivae; no lid-lag; PERRLA     HENT: Atraumatic; oropharynx clear with moist mucous membranes and no mucosal ulcerations;normal hard and soft palate.  No evidence of lymphadenopathy.    Neck: Trachea midline.  No thyromegaly.    Musculoskeletal: No abnormal gait.    Skin: No lesions.    Mental: Cooperative with normal affect.  Is oriented to time, place, and person.    Neuro: Grossly intact.    Chest: Normal inspiratory effort.   No accessory muscles.  No audible wheezes.  Respirations symmetric on inspiration and expiration.    Heart: Regular rhythm.      Abdomen:  Soft, non-tender. No masses or organomegaly. Bladder is not palpable. No evidence of flank discomfort. No evidence of inguinal hernia.    Genitourinary: The penis is circumcised with no evidence of plaques or induration. The urethral meatus is normal. The testes, epididymides, and cord structures are normal in size and contour bilaterally. The scrotum is normal in size and contour.    Normal anal sphincter tone. No rectal mass.    The prostate is 30 g. Normal landmarks. Lateral sulci. Median furrow intact.  No nodularity or induration. Seminal vesicles are  normal.    Extremities: No clubbing, cyanosis, or edema      LABS:      Lab Results   Component Value Date    PSA 1.7 01/21/2020    PSA 0.87 02/14/2019    PSA 0.88 11/20/2017       IMPRESSION:    Encounter Diagnoses   Name Primary?    Erectile dysfunction due to arterial insufficiency Yes    BPH with urinary obstruction     Essential hypertension    HTN, controlled      PLAN:    1. Will observe his LUTS as they don't bother him.  2. Discussed options for his ED.  He'd like [facility]. Side effects discussed.  A new Rx was given.  Will set up for teaching.  3. Discussed the pathophysiology of orgasm.  Recommend that he use positions to maximally stimulate the penis or try a penile vibrator.   3. RTC 3 months to see an STU.    Copy to: Mahsa

## 2020-02-26 NOTE — LETTER
February 26, 2020        Cornelius Bragg MD  [address]  [place] [address]             [facility] - Urology 4th Floor  [address]  [place] [address]  Phone: [phone]   Patient: Tr Chen   MR Number: [id number]   YOB: 1955   Date of Visit: 2/26/2020       Dear Dr. Bragg:    Thank you for referring Tr Chen to me for evaluation. Attached you will find relevant portions of my assessment and plan of care.    If you have questions, please do not hesitate to call me. I look forward to following Tr Chen along with you.    Sincerely,      Louie Costello MD            CC  No Recipients    Enclosure

## 2020-02-26 NOTE — PATIENT INSTRUCTIONS
Penile Self-Injection Procedure  Self-injection is a good option for many men with erectile dysfunction (ED). A tiny needle is used to inject medication into the penis. This helps your penis get hard and stay that way long enough for sex. And sex and orgasm will feel as good as always. You may be nervous about doing self-injection at first. But with practice, it will get easier. Your healthcare provider will show you how to do self-injection the first time. The simple steps are outlined on this sheet.  Preparing for Injection  · Wash your hands well with soap and water.  · Prepare the medication (if needed).  · Sit or  a comfortable position in a warm, well-lit room. If you need to, sit or  front of a mirror.  · Find an injection site on one side of your penis, in a place with no visible veins. (Dont inject into the top, bottom, or head of the penis.)  · Clean the injection site with an alcohol swab. Grasp the head of your penis firmly with your thumb and forefinger (dont just pinch the skin). Stretch the penis so the skin on the shaft is taut.  Injecting the Medication  · Rest your penis against your inner thigh and pull it gently toward your knee. Dont twist or rotate it. This way youll be sure to inject the medication into the spot you chose and cleaned before.  · Hold the syringe between your thumb and fingers, like youre holding a pen. Rest your forearm on your thigh for support.  · Insert the needle at a 90-degree angle (perpendicular) to the shaft. Do this quickly to reduce discomfort. (The needle should go in easily. If it doesnt, stop right away.)  · Move your thumb to the plunger. Press down to inject the medication, counting to 5.  · Remove the needle and dispose of it safely.     The injection site can be in any part of the shaded area.     Insert the needle straight into the penis.    Gaining an Erection  · Apply pressure to the injection site for a few minutes. This prevents  swelling and bruising and helps spread the medication.   · Stand up. This may help your erection develop. Foreplay often helps, too.  · Your penis should become firm within 10-20 minutes. The erection will last long enough for sex, and maybe longer.  Call Your Doctor If You Have:   · An erection that lasts longer than 3-4  hours  · Bleeding or bruising  · Severe pain  · Scarring or curvature of the penis       © 0660-3385 [facility], [address] [place] [address], [place] [address]. All rights reserved. This information is not intended as a substitute for professional medical care. Always follow your healthcare professional's instructions.

## 2020-03-06 ENCOUNTER — PATIENT OUTREACH (OUTPATIENT)
Dept: ADMINISTRATIVE | Facility: OTHER | Age: 65
End: 2020-03-06

## 2020-03-09 ENCOUNTER — OFFICE VISIT (OUTPATIENT)
Dept: UROLOGY | Facility: CLINIC | Age: 65
End: 2020-03-09
Payer: COMMERCIAL

## 2020-03-09 VITALS
HEART RATE: 86 BPM | BODY MASS INDEX: 23.46 KG/M2 | HEIGHT: 75 IN | SYSTOLIC BLOOD PRESSURE: 171 MMHG | DIASTOLIC BLOOD PRESSURE: 88 MMHG | WEIGHT: 188.69 LBS

## 2020-03-09 DIAGNOSIS — N52.35 ERECTILE DYSFUNCTION FOLLOWING RADIATION THERAPY: Primary | ICD-10-CM

## 2020-03-09 PROCEDURE — 1101F PR PT FALLS ASSESS DOC 0-1 FALLS W/OUT INJ PAST YR: ICD-10-PCS | Mod: CPTII,S$GLB,, | Performed by: NURSE PRACTITIONER

## 2020-03-09 PROCEDURE — 99215 PR OFFICE/OUTPT VISIT, EST, LEVL V, 40-54 MIN: ICD-10-PCS | Mod: S$GLB,,, | Performed by: NURSE PRACTITIONER

## 2020-03-09 PROCEDURE — 3008F BODY MASS INDEX DOCD: CPT | Mod: CPTII,S$GLB,, | Performed by: NURSE PRACTITIONER

## 2020-03-09 PROCEDURE — 3077F SYST BP >= 140 MM HG: CPT | Mod: CPTII,S$GLB,, | Performed by: NURSE PRACTITIONER

## 2020-03-09 PROCEDURE — 3079F PR MOST RECENT DIASTOLIC BLOOD PRESSURE 80-89 MM HG: ICD-10-PCS | Mod: CPTII,S$GLB,, | Performed by: NURSE PRACTITIONER

## 2020-03-09 PROCEDURE — 99215 OFFICE O/P EST HI 40 MIN: CPT | Mod: S$GLB,,, | Performed by: NURSE PRACTITIONER

## 2020-03-09 PROCEDURE — 3079F DIAST BP 80-89 MM HG: CPT | Mod: CPTII,S$GLB,, | Performed by: NURSE PRACTITIONER

## 2020-03-09 PROCEDURE — 3008F PR BODY MASS INDEX (BMI) DOCUMENTED: ICD-10-PCS | Mod: CPTII,S$GLB,, | Performed by: NURSE PRACTITIONER

## 2020-03-09 PROCEDURE — 3077F PR MOST RECENT SYSTOLIC BLOOD PRESSURE >= 140 MM HG: ICD-10-PCS | Mod: CPTII,S$GLB,, | Performed by: NURSE PRACTITIONER

## 2020-03-09 PROCEDURE — 1101F PT FALLS ASSESS-DOCD LE1/YR: CPT | Mod: CPTII,S$GLB,, | Performed by: NURSE PRACTITIONER

## 2020-03-09 PROCEDURE — 99999 PR PBB SHADOW E&M-EST. PATIENT-LVL IV: CPT | Mod: PBBFAC,,, | Performed by: NURSE PRACTITIONER

## 2020-03-09 PROCEDURE — 99999 PR PBB SHADOW E&M-EST. PATIENT-LVL IV: ICD-10-PCS | Mod: PBBFAC,,, | Performed by: NURSE PRACTITIONER

## 2020-03-09 RX ORDER — SYRINGE,SAFETY WITH NEEDLE,1ML 25GX1"
SYRINGE (EA) MISCELLANEOUS
Qty: 10 EACH | Refills: 12 | Status: SHIPPED | OUTPATIENT
Start: 2020-03-09

## 2020-03-09 RX ORDER — PAPAVERINE HYDROCHLORIDE 30 MG/ML
INJECTION INTRAMUSCULAR; INTRAVENOUS
Qty: 10 ML | Refills: 11 | Status: SHIPPED | OUTPATIENT
Start: 2020-03-09 | End: 2021-02-03 | Stop reason: SDUPTHER

## 2020-03-09 NOTE — LETTER
March 9, 2020      Cornelius Bragg MD  [address]  [place] [address]           [facility] - Urology 4th Floor  [address]  [place] [address]  Phone: [phone]          Patient: Tr Chen   MR Number: [id number]   YOB: 1955   Date of Visit: 3/9/2020       Dear Dr. Cornelius Bragg:    Thank you for referring Tr Chen to me for evaluation. Attached you will find relevant portions of my assessment and plan of care.    If you have questions, please do not hesitate to call me. I look forward to following Tr Chen along with you.    Sincerely,    Radha Miles, NP    Enclosure  CC:  No Recipients    If you would like to receive this communication electronically, please contact [email or web address] or [phone] to request more information on [employer] access.    For providers and/or their staff who would like to refer a patient to [facility], please contact us through our one-stop-shop provider referral line, Yenny Gan, at [phone].    If you feel you have received this communication in error or would no longer like to receive these types of communications, please e-mail [email or web address]

## 2020-03-09 NOTE — PATIENT INSTRUCTIONS
[facility]  [phone]    1. Wipe off top of medication vial with an alcohol prep.   2. With the vial held firmly on a flat surface, insert the syringe into the vial.  3. Now carefully  the vial with the needle in place and turn upside down.  4. You can then push in syringe (like you are giving a shot) to get all the air out of the syringe.  5. You can then pull the plunger of the syringe back down while keeping the needle inserted in the vial to get your desired results.   6. If having difficulty seeing the medication, rapidly pull back the syringe and then you will see the medication fill up. You may never get all the tiny air bubbles out. It is ok.     He was instructed to keep the dosage at 15 units. If noticing a decrease in reaction he can increase the dosage by 5 units.   He may also refill the Rx if > 4-6 months.    Penile Self-Injection: Notes and Precautions     Man and healthcare provider sitting across from one another, talking.   Penile self-injection is a simple technique that may improve your sex life. Some men even find that self-injection leads to an increase in natural erections. If you have questions or concerns about self-injection or erectile dysfunction (ED), talk with your healthcare provider. The information on this sheet will help you get the best results.  Notes about penile self-injection  · You may feel a mild burning during injection. This is OK. But if you feel pressure or severe pain, stop the injection. There may be a problem with the injection site.  · Only inject the medicine on the side of your penis. It may not work if injected elsewhere.  · To prevent scarring, inject in a different spot each time.  · Dont use this treatment if you have a bleeding disorder or any risk of infection.  · Get medical help right away if your erection lasts longer than 3 to 4 hours.  Work with your healthcare provider  Ask how often you can safely repeat injections, as well as any  other questions you have. You and your healthcare provider will talk about follow-up exams and how to get supplies. If the medicine doesnt work or stops working over time, tell your healthcare provider.     When to call your healthcare provider  Call your healthcare provider right away if any of these occur:  · An erection that lasts longer than 3 to 4  hours  · Bleeding or bruising  · Severe pain  · Scarring or curvature of the penis   Date Last Reviewed: 1/1/2017 © 2000-2017 [employer]. [address] [place] [address]. All rights reserved. This information is not intended as a substitute for professional medical care. Always follow your healthcare professional's instructions.        Penile Self-Injection Procedure  Self-injection is a good option if you have erectile dysfunction (ED). You insert a tiny needle into your penis and inject a medicine. This helps your penis get hard and stay that way long enough for sex. And sex and orgasm will feel as good as always. You may be nervous about doing self-injection at first. But with practice, it will get easier. Your healthcare provider will show you how to do self-injection the first time.  Talk to your doctor about any medicines you take and any medical problems you have.      Preparing for injection  · Wash your hands well with soap and water.  · Prepare the medicine (if needed).  · Sit or  a comfortable position in a warm, well-lit room. If you need to, sit or  front of a mirror.  · Find an injection site on one side of your penis, in a place with no visible veins. (Dont inject into the top, bottom, or head of the penis.)  · Clean the injection site with an alcohol swab. Grasp the head of your penis firmly with your thumb and forefinger (dont just pinch the skin). Stretch the penis so the skin on the shaft is taut.  Injecting the medicine  · Rest your penis against your inner thigh and pull it gently toward your knee.  Dont twist or rotate it. This way youll be sure to inject the medicine into the spot you chose and cleaned before.  · Hold the syringe between your thumb and fingers, like youre holding a pen. Rest your forearm on your thigh for support.  · Insert the needle at a 90° angle (perpendicular) to the shaft. Do this quickly to reduce discomfort. (The needle should go in easily. If it doesnt, stop right away.)  · Move your thumb to the plunger. Press down to inject the medicine, counting to 5.  · Remove the needle and dispose of it safely.  Gaining an erection  · Apply pressure to the injection site for a few minutes. This prevents swelling and bruising and helps spread the medicine.   · Stand up. This may help your erection develop. Foreplay often helps, too.  · Your penis should become firm within 10 to 20 minutes. The erection will last long enough for sex, and maybe longer.  When to seek medical care  An erection that lasts longer than 3 to 4  hours  · Bleeding or bruising  · Severe pain  · Scarring or curvature of the penis   Date Last Reviewed: 1/7/2017  © 0374-6410 [employer], [employer]. [address] [place] [address], [place] [address]. All rights reserved. This information is not intended as a substitute for professional medical care. Always follow your healthcare professional's instructions.

## 2020-03-09 NOTE — PROGRESS NOTES
Subjective:       Patient ID: Tr Chen is a 65 y.o. male.    Chief Complaint: Erectile Dysfunction    Tr Chen is a 65 y.o. male who c/o ED.    This has been present since XRT for colon cancer.    This has been present for > 1 year.  He's tried Viagra and Cialis.  His T is normal.  02/26/2020 seen by Dr. Costello    He also c/o difficulty achieving an orgasm.  He is on chronic opiods for pain.     He has nocturia x 2.  Good FOS.  No hesitancy.  No urgency.  No hematuria.  No dysuria.    Review of Systems   Constitutional: Negative for activity change, appetite change, chills and fever.   HENT: Negative for facial swelling and trouble swallowing.    Eyes: Negative for visual disturbance.   Respiratory: Negative for chest tightness and shortness of breath.    Cardiovascular: Negative for chest pain and palpitations.   Gastrointestinal: Negative.  Negative for abdominal pain, constipation, diarrhea, nausea and vomiting.   Genitourinary: Negative for difficulty urinating, dysuria, flank pain, hematuria, penile pain, penile swelling, scrotal swelling and testicular pain.        Ok with urination     Musculoskeletal: Negative for back pain, gait problem, myalgias and neck stiffness.   Skin: Negative for rash.   Neurological: Negative for dizziness and speech difficulty.   Hematological: Does not bruise/bleed easily.   Psychiatric/Behavioral: Negative for behavioral problems.       Objective:      Physical Exam   Nursing note and vitals reviewed.  Constitutional: He is oriented to person, place, and time. Vital signs are normal. He appears well-developed and well-nourished. He is active and cooperative.  Non-toxic appearance. He does not have a sickly appearance.   HENT:   Head: Normocephalic and atraumatic.   Right Ear: External ear normal.   Left Ear: External ear normal.   Nose: Nose normal.   Mouth/Throat: Mucous membranes are normal.   Eyes: Conjunctivae and lids are normal. No scleral icterus.   Neck:  Trachea normal, normal range of motion and full passive range of motion without pain. Neck supple. No JVD present. No tracheal deviation present.   Cardiovascular: Normal rate, S1 normal and S2 normal.    Pulmonary/Chest: Effort normal. No respiratory distress. He exhibits no tenderness.   Abdominal: Soft. Normal appearance. There is no hepatosplenomegaly. There is no tenderness. There is no CVA tenderness.   Genitourinary: Testes normal and penis normal.   Musculoskeletal: Normal range of motion.   Neurological: He is alert and oriented to person, place, and time. He has normal strength.   Skin: Skin is warm, dry and intact.     Psychiatric: He has a normal mood and affect. His behavior is normal. Judgment and thought content normal.       Assessment:       1. Erectile dysfunction following radiation therapy        Plan:         I spent 60 minutes with the patient of which more than half was spent in direct consultation with the patient in regards to our treatment and plan.    Education and recommendations of today's plan of care including home remedies.  We discussed ED and the contributing factors. We reviewed his personal factors that contribute to ED. Patient was educated on ED treatments. We discussed PDE-5 inhibitors, FABIANO, Muse, and IPP.    He is here today for the Intracorporal injection/ICI education and first injection.  Educational materials were supplied.    ICI is an injectable medication that consists of three different medications to produce an erection. It is known as a Trimix Compound or PEP. The medication is a compound medication and is only mixed up at certain pharmacies known as a compound pharmacy. The medication has to be stored in the refrigerator. Improper storage decreases the effectiveness of the medication.     He was educated that it is an injection. With any injection there is risk for possible pain at the injection site as well as risk for an infection. Clean technique must be followed  to help prevent infection. Proper needle disposable is necessary. He voices understanding.    The injection is best given when standing up and the penis is positioned perpendicular to the body. The urethral opening should be facing away from the body. Injection is always given on the lateral or side of the penis. Never give the injection to the top of the penis to avoid possible trauma to arteries and veins. Never give the injection to the bottom of the penis to avoid trauma to the urethra. He should alternate the injection sites to avoid the build up of scar tissue due to multiple injections.    This medication can increase the risk of erections lasting longer than 4 hours. This is known as a priapism and is a medical emergency. This requires immediate medical attention. This is main reason we give the first dose in the office today. We start at low dose and see how well the patients reacts. We can increase the medication if needed depending on the reaction the patient receives today. We discussed the fine line between enough medication for penetration and too much leading to a priapism. He voices understanding.    He witnessed me draw up 15 units and h injected himself  in the right lateral aspect of the penis. Within 15 minutes he achieved a good filling; not quite firm enough for penetration. 30 minutes he achieved an erection he was pleased with.  After hour his erection started to subside. He reports very please. No pain or discomfort.  He was instructed to keep the dosage at 15 units. If noticing a decrease in reaction he can increase the dosage by 5 units or 0.05ml. He may also refill the Rx if > 4-6 months.  Discussed risks with ICI therapy every day < 24 hours.    If have any questions, please give me a call. Educational materials were given to patient and all questions were answered. He voiced understanding and left the office without a priapism. Follow up 3 months

## 2020-04-21 ENCOUNTER — OFFICE VISIT (OUTPATIENT)
Dept: FAMILY MEDICINE | Facility: CLINIC | Age: 65
End: 2020-04-21
Payer: COMMERCIAL

## 2020-04-21 DIAGNOSIS — N52.9 ERECTILE DYSFUNCTION, UNSPECIFIED ERECTILE DYSFUNCTION TYPE: ICD-10-CM

## 2020-04-21 DIAGNOSIS — C18.9 METASTATIC COLON CANCER TO LIVER: ICD-10-CM

## 2020-04-21 DIAGNOSIS — M25.571 CHRONIC PAIN OF BOTH ANKLES: ICD-10-CM

## 2020-04-21 DIAGNOSIS — G89.29 OTHER CHRONIC PAIN: Primary | ICD-10-CM

## 2020-04-21 DIAGNOSIS — C78.7 METASTATIC COLON CANCER TO LIVER: ICD-10-CM

## 2020-04-21 DIAGNOSIS — M25.572 CHRONIC PAIN OF BOTH ANKLES: ICD-10-CM

## 2020-04-21 DIAGNOSIS — G89.29 CHRONIC PAIN OF BOTH ANKLES: ICD-10-CM

## 2020-04-21 PROCEDURE — 99214 PR OFFICE/OUTPT VISIT, EST, LEVL IV, 30-39 MIN: ICD-10-PCS | Mod: 95,,, | Performed by: INTERNAL MEDICINE

## 2020-04-21 PROCEDURE — 1101F PR PT FALLS ASSESS DOC 0-1 FALLS W/OUT INJ PAST YR: ICD-10-PCS | Mod: CPTII,,, | Performed by: INTERNAL MEDICINE

## 2020-04-21 PROCEDURE — 1101F PT FALLS ASSESS-DOCD LE1/YR: CPT | Mod: CPTII,,, | Performed by: INTERNAL MEDICINE

## 2020-04-21 PROCEDURE — 99214 OFFICE O/P EST MOD 30 MIN: CPT | Mod: 95,,, | Performed by: INTERNAL MEDICINE

## 2020-04-21 RX ORDER — HYDROCODONE BITARTRATE AND ACETAMINOPHEN 5; 325 MG/1; MG/1
1 TABLET ORAL EVERY 6 HOURS PRN
Qty: 120 TABLET | Refills: 0 | Status: SHIPPED | OUTPATIENT
Start: 2020-06-21 | End: 2020-11-16 | Stop reason: SDUPTHER

## 2020-04-21 RX ORDER — HYDROCODONE BITARTRATE AND ACETAMINOPHEN 5; 325 MG/1; MG/1
1 TABLET ORAL EVERY 6 HOURS PRN
Qty: 120 TABLET | Refills: 0 | Status: SHIPPED | OUTPATIENT
Start: 2020-04-21 | End: 2020-07-20 | Stop reason: SDUPTHER

## 2020-04-21 RX ORDER — HYDROCODONE BITARTRATE AND ACETAMINOPHEN 5; 325 MG/1; MG/1
1 TABLET ORAL EVERY 6 HOURS PRN
Qty: 120 TABLET | Refills: 0 | Status: SHIPPED | OUTPATIENT
Start: 2020-05-21 | End: 2020-07-28 | Stop reason: SDUPTHER

## 2020-04-21 NOTE — PROGRESS NOTES
Chief complaint refills and follow-up from colon cancer, discussed erectile dysfunction    64-year-old white male here to discuss several issues.  He was seen using audio and visual via face time.  It seemed to work very well.  Patient's location was in his car which was parked as he had come to the clinic thinking he was still going to have his original appointment.  He was not driving.  He needs refills of his pain medication.  He still working [place] the [employer].  He carries a heavy bag which occasionally is now causing him some right shoulder pain and he continues with his chronic knee and ankle pain with prolonged walking.  The pain medication allows him to function in his use has been chronic and stable.  He also has some cancer related pain.  He is overdue for his MRI which was February of 2019.  We discussed the issues of the pandemic and how that is obviously complicating him getting the MRI at this time.  His B12 was low normal and he is taking B12.  If indeed he gets an MRI in the near future and needs lab work prior to contrast we can add another B12 level.  We reviewed the interval urology notes where upon he did try an injection for erectile dysfunction which did work and he has the prescription which was expensive but now with the viral pandemic he really has no opportunity to try the medication is frustrated by that but will plan to look forward to using it in the future..      Counseled regarding all these issues with patient and controlled substance management was done by physician creating three prescriptions, changing the dates, sending to pharmacy and putting in all the appropriate authentic a shin codes all while online with patient.Total time over 25 minutes with over 50% counseling.    He does need his controlled substance management and refills done for the next three months.    Reviewed his labs.  Essentially is labs are due this time of year and so will update all of those.  His MCV was 100,  his B12 level was low normal and he started some B12.        Previous MRI  Impression       Interval right hepatic lobe ablation.  No residual enhancement at this site.    No new concerning liver lesions identified, noting limited evaluation due to innumerable underlying cysts of varying sizes.  Continued CT PET imaging for future follow-up recommended.      Electronically signed by: Eduin Castillo MD  Date: 2019           Review of systems: No fevers or chills, no other urinary complaints, no further GI complaints    PAST MEDICAL HISTORY:   1. ED.   2. HTN.   3. Smoker.   4. Allergic rhinitis with exposure to mold   5. Frequent bronchitis   6. Lumbar strain due to work on [employer]   7. Large colon polyp removed before age 50 - over 10-15 -yrs  8.  Chronic pain referable to ankle arthritis   9.  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 One younger brother with no medical problems        Pleasant male in no distress seen on face time    Diagnoses and all orders for this visit:    Other chronic pain, controlled substance management and monitoring done, condition is chronic and stable at this point but still requiring the hydrocodone    Chronic pain of both ankles, source of pain    Metastatic colon cancer to liver, some source of pain, overdue for follow-up but work and pandemic issues complicating    Erectile dysfunction, unspecified erectile dysfunction type, reviewed interval treatment plan    Other orders  -     HYDROcodone-acetaminophen (NORCO) 5-325 mg per tablet; Take 1 tablet by mouth every 6 (six) hours as needed for Pain. :MORE THAN A SEVEN DAY SUPPLY OF OPIOID IS MEDICALLY NECESSARY.  -     HYDROcodone-acetaminophen (NORCO) 5-325 mg per tablet; Take 1 tablet by mouth every 6 (six) hours as  needed for Pain.  -     HYDROcodone-acetaminophen (NORCO) 5-325 mg per tablet; Take 1 tablet by mouth every 6 (six) hours as needed for Pain (Chronic pain, G89.4). :MORE THAN A SEVEN DAY SUPPLY OF OPIOID IS MEDICALLY NECESSARY.

## 2020-04-27 ENCOUNTER — TELEPHONE (OUTPATIENT)
Dept: FAMILY MEDICINE | Facility: CLINIC | Age: 65
End: 2020-04-27

## 2020-04-27 NOTE — TELEPHONE ENCOUNTER
Please call patient and advise that Dr. CAMPBELL reviewed the message and if you are really having chest pain going into the left arm, you really need to go and get that checked out today since it could be a sign of a blockage in the heart.     1st clarify does the left arm pain and tingling relate to when having the chest pain or is that separate?    If the chest pain in the arm symptoms occur at the same time,  Probably best to go to the emergency room although we realize people are trying to avoid the emergency room.  It is very safe to go to the emergency room.  You probably need in EKG and a checkup of the heart.

## 2020-04-27 NOTE — TELEPHONE ENCOUNTER
Spoke with the patient and he thinks it's time for him to start hypertension  Medication.  Patient states in the last 7 to 8 days, he has experienced chest tightness, that lasted 2 to 3 mins.  His left arm, has some fingers that has been tingling.  Pt don't have a blood pressure cuff at home and he didn't have any BP readings. Please advise

## 2020-04-27 NOTE — TELEPHONE ENCOUNTER
Please call patient and advise that Dr. CAMPBELL reviewed the message and if you are really having chest pain going into the left arm, you really need to go and get that checked out today since it could be a sign of a blockage in the heart.      1st clarify does the left arm pain and tingling relate to when having the chest pain or is that separate?     If the chest pain in the arm symptoms occur at the same time,  Probably best to go to the emergency room although we realize people are trying to avoid the emergency room.  It is very safe to go to the emergency room.  You probably need in EKG and a checkup of the heart.    Patient states that when it happens, both thinks happens at the same time.  Patient wants to avoid the Er and he said if something happens today he will go to the Er.  Patient wants to know can  just sent in blood pressure medication?  Again pt did state that both thinks are happening at the same time.  Please advise

## 2020-04-27 NOTE — TELEPHONE ENCOUNTER
The blood pressure medication would be if he has high blood pressure so we would really need to know what is blood pressure and his pulse are doing.    Spoke with the pt and he will go out and buy a blood pressure monitoring kit today.  I ask the patient to take his BP for 3 days, morning and night.  He will give us a call back with the recordings.  Patient verbalized understandings.

## 2020-04-27 NOTE — TELEPHONE ENCOUNTER
----- Message from Rebeca Rivera sent at 4/27/2020  8:20 AM CDT -----  Contact: Patient [phone]  Type: Patient Call Back    Who called: Patient    What is the request in detail: Would like to speak to Dr Bragg in regards to blood pressure medication. Thinks it's time to start the medication. Please call.     Would the patient rather a call back or a response via [facility]? Call back    Best call back number: [phone]

## 2020-04-27 NOTE — TELEPHONE ENCOUNTER
The blood pressure medication would be if he has high blood pressure so we would really need to know what is blood pressure and his pulse are doing.    At his last visit in January blood pressure okay

## 2020-04-30 ENCOUNTER — TELEPHONE (OUTPATIENT)
Dept: FAMILY MEDICINE | Facility: CLINIC | Age: 65
End: 2020-04-30

## 2020-04-30 RX ORDER — IRBESARTAN 150 MG/1
150 TABLET ORAL NIGHTLY
Qty: 90 TABLET | Refills: 3 | Status: SHIPPED | OUTPATIENT
Start: 2020-04-30 | End: 2023-02-13 | Stop reason: CLARIF

## 2020-04-30 NOTE — TELEPHONE ENCOUNTER
Thanks, tell patient does look like his blood pressures are up so I will start him on Avapro 150 and I sent that to the [facility]'s.  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

## 2020-04-30 NOTE — TELEPHONE ENCOUNTER
Spoke with the patient and this is his bp readings:  4/28--161/90  4/28--167/89 p 82  4/28--147/95  p 87.  4/29--158/96  p 78   4/29--161/97 p 94  4/29--170/97  p78  Night of 4/29--125/81 p96.  4/30--167/95  p68   4/30--152/105  P93.  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                  ----- Message from Daniella Ratliff sent at 4/30/2020  8:53 AM CDT -----  Contact: pt  Type: Patient Call Back    Who called:pt    What is the request in detail:pt discuss bp readings. Please call pt    Can the clinic reply by [facility]?    Would the patient rather a call back or a response via [facility]? call    Best call back number:[phone] (home)       Additional Information:

## 2020-04-30 NOTE — TELEPHONE ENCOUNTER
Thanks, tell patient does look like his blood pressures are up so I will start him on Avapro 150 and I sent that to the [facility]'s.  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    Spoke with the pt and he denies any chest pain.  I advise the patient to take his medication for a week and give the office a call back with the reading, morning and night.  Patient verbalized understandings.

## 2020-05-04 ENCOUNTER — HOSPITAL ENCOUNTER (EMERGENCY)
Facility: HOSPITAL | Age: 65
Discharge: HOME | End: 2020-05-04
Attending: EMERGENCY MEDICINE
Payer: COMMERCIAL

## 2020-05-04 VITALS
HEART RATE: 64 BPM | TEMPERATURE: 98 F | OXYGEN SATURATION: 98 % | SYSTOLIC BLOOD PRESSURE: 136 MMHG | DIASTOLIC BLOOD PRESSURE: 72 MMHG | WEIGHT: 189 LBS | RESPIRATION RATE: 18 BRPM | BODY MASS INDEX: 23.5 KG/M2 | HEIGHT: 75 IN

## 2020-05-04 DIAGNOSIS — R10.13 EPIGASTRIC ABDOMINAL PAIN: Primary | ICD-10-CM

## 2020-05-04 DIAGNOSIS — R03.0 ELEVATED BLOOD PRESSURE READING: ICD-10-CM

## 2020-05-04 DIAGNOSIS — K76.0 HEPATIC STEATOSIS: ICD-10-CM

## 2020-05-04 DIAGNOSIS — R93.89 ABNORMAL CHEST X-RAY: ICD-10-CM

## 2020-05-04 DIAGNOSIS — K76.89 HEPATIC CYST: ICD-10-CM

## 2020-05-04 LAB
ALBUMIN SERPL BCP-MCNC: 3.8 G/DL (ref 3.5–5.2)
ALP SERPL-CCNC: 67 U/L (ref 55–135)
ALT SERPL W/O P-5'-P-CCNC: 11 U/L (ref 10–44)
ANION GAP SERPL CALC-SCNC: 10 MMOL/L (ref 8–16)
AST SERPL-CCNC: 17 U/L (ref 10–40)
BASOPHILS # BLD AUTO: 0.04 K/UL (ref 0–0.2)
BASOPHILS NFR BLD: 0.6 % (ref 0–1.9)
BILIRUB SERPL-MCNC: 0.5 MG/DL (ref 0.1–1)
BNP SERPL-MCNC: 83 PG/ML (ref 0–99)
BUN SERPL-MCNC: 6 MG/DL (ref 8–23)
CALCIUM SERPL-MCNC: 8.9 MG/DL (ref 8.7–10.5)
CHLORIDE SERPL-SCNC: 108 MMOL/L (ref 95–110)
CO2 SERPL-SCNC: 22 MMOL/L (ref 23–29)
CREAT SERPL-MCNC: 0.8 MG/DL (ref 0.5–1.4)
DIFFERENTIAL METHOD: ABNORMAL
EOSINOPHIL # BLD AUTO: 0.1 K/UL (ref 0–0.5)
EOSINOPHIL NFR BLD: 1.1 % (ref 0–8)
ERYTHROCYTE [DISTWIDTH] IN BLOOD BY AUTOMATED COUNT: 13.2 % (ref 11.5–14.5)
EST. GFR  (AFRICAN AMERICAN): >60 ML/MIN/1.73 M^2
EST. GFR  (NON AFRICAN AMERICAN): >60 ML/MIN/1.73 M^2
GLUCOSE SERPL-MCNC: 97 MG/DL (ref 70–110)
HCT VFR BLD AUTO: 45.3 % (ref 40–54)
HGB BLD-MCNC: 15 G/DL (ref 14–18)
IMM GRANULOCYTES # BLD AUTO: 0.03 K/UL (ref 0–0.04)
IMM GRANULOCYTES NFR BLD AUTO: 0.5 % (ref 0–0.5)
INR PPP: 1 (ref 0.8–1.2)
LIPASE SERPL-CCNC: 14 U/L (ref 4–60)
LYMPHOCYTES # BLD AUTO: 0.9 K/UL (ref 1–4.8)
LYMPHOCYTES NFR BLD: 14.3 % (ref 18–48)
MAGNESIUM SERPL-MCNC: 1.8 MG/DL (ref 1.6–2.6)
MCH RBC QN AUTO: 32.4 PG (ref 27–31)
MCHC RBC AUTO-ENTMCNC: 33.1 G/DL (ref 32–36)
MCV RBC AUTO: 98 FL (ref 82–98)
MONOCYTES # BLD AUTO: 0.6 K/UL (ref 0.3–1)
MONOCYTES NFR BLD: 9.4 % (ref 4–15)
NEUTROPHILS # BLD AUTO: 4.7 K/UL (ref 1.8–7.7)
NEUTROPHILS NFR BLD: 74.1 % (ref 38–73)
NRBC BLD-RTO: 0 /100 WBC
PLATELET # BLD AUTO: 200 K/UL (ref 150–350)
PMV BLD AUTO: 8.9 FL (ref 9.2–12.9)
POCT GLUCOSE: 102 MG/DL (ref 70–110)
POTASSIUM SERPL-SCNC: 3.8 MMOL/L (ref 3.5–5.1)
PROT SERPL-MCNC: 6.9 G/DL (ref 6–8.4)
PROTHROMBIN TIME: 10.7 SEC (ref 9–12.5)
RBC # BLD AUTO: 4.63 M/UL (ref 4.6–6.2)
SODIUM SERPL-SCNC: 140 MMOL/L (ref 136–145)
TROPONIN I SERPL DL<=0.01 NG/ML-MCNC: 0.01 NG/ML (ref 0–0.03)
WBC # BLD AUTO: 6.36 K/UL (ref 3.9–12.7)

## 2020-05-04 PROCEDURE — 85610 PROTHROMBIN TIME: CPT

## 2020-05-04 PROCEDURE — 93010 EKG 12-LEAD: ICD-10-PCS | Mod: ,,, | Performed by: INTERNAL MEDICINE

## 2020-05-04 PROCEDURE — 99285 EMERGENCY DEPT VISIT HI MDM: CPT | Mod: 25

## 2020-05-04 PROCEDURE — 82962 GLUCOSE BLOOD TEST: CPT

## 2020-05-04 PROCEDURE — 85025 COMPLETE CBC W/AUTO DIFF WBC: CPT

## 2020-05-04 PROCEDURE — 93005 ELECTROCARDIOGRAM TRACING: CPT

## 2020-05-04 PROCEDURE — 80053 COMPREHEN METABOLIC PANEL: CPT

## 2020-05-04 PROCEDURE — 93010 ELECTROCARDIOGRAM REPORT: CPT | Mod: ,,, | Performed by: INTERNAL MEDICINE

## 2020-05-04 PROCEDURE — 83735 ASSAY OF MAGNESIUM: CPT

## 2020-05-04 PROCEDURE — 84484 ASSAY OF TROPONIN QUANT: CPT

## 2020-05-04 PROCEDURE — 83880 ASSAY OF NATRIURETIC PEPTIDE: CPT

## 2020-05-04 PROCEDURE — 83690 ASSAY OF LIPASE: CPT

## 2020-05-04 NOTE — ED PROVIDER NOTES
"Encounter Date: 5/4/2020       History     Chief Complaint   Patient presents with    Hypertension     Pt reports really high blood pressure readings x1 week. Reports starting ibersartan 150 mg 4 days ago. Pt states he came to ED because of episode last night that "felt like elephant sitting on chest". Denies SOB, headache, dizziness. Denies pain at this time    Abdominal Pain     65-year-old male with history of colon cancer (resected in 2016; no chemo or radiation), diabetes, and hypertension presents to the emergency department for 7 day history of intermittent epigastric abdominal pain.  Symptoms last approximately 2-3 minutes and occur 1 to 2 times a day. Symptoms are worse with eating. Denies associated nausea, vomiting, diaphoresis, fever, URI symptoms, shortness of breath, numbness, and abdominal pain.  Patient is currently asymptomatic while in the ED, however.  Patient has never experienced these symptoms before and has no known gallbladder or pancreatic issues.  He drinks EtOH occasionally. Patient was started on a new blood pressure medication, Irbesartan (Avapro) 150mg, 5 days ago.  Patient has concerns could be causing his symptoms.  He did not take this BP medication this morning.  No prior cardiac history.  He is a smoker.  No history of DVT/PE or heart failure.  No history of trauma or recent long-distance travel.  Not anticoagulated.        Review of patient's allergies indicates:   Allergen Reactions    No known allergies      Past Medical History:   Diagnosis Date    Cancer     metastatic colon cancer    Chronic heel pain     Bilateral    Chronic pain 11/20/2012    Erectile dysfunction 5/16/2014    History of chemotherapy 11/28/2017    History of colonic polyps 7/16/2012    HTN (hypertension) 7/16/2012    Hypertension     Liver lesion     October biopsy showed liver mets    Tobacco use disorder 1/19/2015     Past Surgical History:   Procedure Laterality Date    COLON SURGERY  " 12/27/2016    colon resection    COLONOSCOPY N/A 11/21/2016    Procedure: COLONOSCOPY;  Surgeon: Jono Resendez MD;  Location: [facility] ENDO;  Service: Endoscopy;  Laterality: N/A;  needs scope done 2/24/16  -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     COLONOSCOPY N/A 12/27/2017    Procedure: COLONOSCOPY;  Surgeon: Jono Resendez MD;  Location: [facility] ENDO;  Service: Endoscopy;  Laterality: N/A;    FRACTURE SURGERY      both feet    LIVER BIOPSY N/A 10/18/2018    Procedure: BIOPSY, LIVER;  Surgeon: Zaire Diagnostic Provider;  Location: [facility] OR;  Service: Radiology;  Laterality: N/A;  9AM START  RN PREOP 10/12/2018    TONSILLECTOMY       Family History   Problem Relation Age of Onset    Cancer Mother         breast     Social History     Tobacco Use    Smoking status: Current Every Day Smoker     Packs/day: 0.50     Years: 30.00     Pack years: 15.00     Types: Cigarettes    Smokeless tobacco: Never Used   Substance Use Topics    Alcohol use: Yes     Comment: social    Drug use: No     Review of Systems   Constitutional: Negative for fever.   HENT: Negative for congestion, sore throat and trouble swallowing.    Respiratory: Negative for cough and shortness of breath.    Cardiovascular: Negative for chest pain, palpitations and leg swelling.   Gastrointestinal: Positive for abdominal pain (epigastric). Negative for constipation, diarrhea, nausea and vomiting.   Genitourinary: Negative for dysuria, flank pain, frequency and urgency.   Musculoskeletal: Negative for back pain.   Skin: Negative for rash.   Neurological: Negative for headaches.   All other systems reviewed and are negative.      Physical Exam     Initial Vitals [05/04/20 0953]   BP Pulse Resp Temp SpO2   (!) 184/87 79 18 97.5 °F (36.4 °C) 98 %      MAP       --         Physical Exam    Nursing note and vitals reviewed.  Constitutional: He appears well-developed and well-nourished. He is not diaphoretic. No distress.   HENT:   Head: Normocephalic and  atraumatic.   Nose: Nose normal.   Eyes: Conjunctivae and EOM are normal. Pupils are equal, round, and reactive to light. Right eye exhibits no discharge. Left eye exhibits no discharge.   Neck: Normal range of motion. No tracheal deviation present. No JVD present.   Cardiovascular: Normal rate, regular rhythm and normal heart sounds. Exam reveals no friction rub.    No murmur heard.  Pulmonary/Chest: Breath sounds normal. No stridor. No respiratory distress. He has no wheezes. He has no rhonchi. He has no rales. He exhibits no tenderness.   Abdominal: Soft. He exhibits no distension. There is no tenderness. There is no rigidity, no rebound, no guarding, no CVA tenderness, no tenderness at McBurney's point and negative Joseph's sign.   No pulsatile abdominal mass.  Well-healed older appearing surgical scar noted to suprapubic area.  Distal extremity pulses 2+ and equal.   Musculoskeletal: Normal range of motion.   No lower extremity swelling or tenderness   Neurological: He is alert and oriented to person, place, and time.   Skin: Skin is warm and dry. No rash and no abscess noted. No erythema. No pallor.         ED Course   Procedures  Labs Reviewed   CBC W/ AUTO DIFFERENTIAL - Abnormal; Notable for the following components:       Result Value    Mean Corpuscular Hemoglobin 32.4 (*)     MPV 8.9 (*)     Lymph # 0.9 (*)     Gran% 74.1 (*)     Lymph% 14.3 (*)     All other components within normal limits   COMPREHENSIVE METABOLIC PANEL - Abnormal; Notable for the following components:    CO2 22 (*)     BUN, Bld 6 (*)     All other components within normal limits   TROPONIN I   B-TYPE NATRIURETIC PEPTIDE   PROTIME-INR   MAGNESIUM   LIPASE     EKG Readings: (Independently Interpreted)   Initial Reading: No STEMI. Rhythm: Normal Sinus Rhythm. Heart Rate: 72. Axis: Normal.       Imaging Results          US Abdomen Limited (Final result)  Result time 05/04/20 11:51:06    Final result by Osmel Hein MD (05/04/20  11:51:06)                 Impression:      Multiple simple appearing hepatic cysts.    Mild hepatomegaly and findings suggestive hepatic steatosis.      Electronically signed by: Osmel Hein MD  Date:    05/04/2020  Time:    11:51             Narrative:    EXAMINATION:  US ABDOMEN LIMITED    CLINICAL HISTORY:  epigastric pain;    TECHNIQUE:  Limited ultrasound of the right upper quadrant of the abdomen (including pancreas, liver, gallbladder, common bile duct, and spleen) was performed.    COMPARISON:  CT abdomen pelvis from 11/20/2017    FINDINGS:  Liver: Mildly enlarged in size measuring 18.3 cm. Increased hepatic echogenicity which may be seen with hepatic steatosis.  Multiple cysts are noted scattered throughout the liver, the largest of which measures 2.7 x 2.6 x 2.8 cm in the right hepatic lobe.    Gallbladder: No calculi, wall thickening, or pericholecystic fluid.  No sonographic Joseph's sign.    Biliary system: The common duct is not dilated, measuring 3 mm.  No intrahepatic ductal dilatation.    Spleen: Normal in size and echotexture, measuring 10.6 cm.    Miscellaneous: No upper abdominal ascites.                               X-Ray Chest AP Portable (Final result)  Result time 05/04/20 11:15:28    Final result by Bal Catalan MD (05/04/20 11:15:28)                 Impression:      Nodular opacity projecting over the left lung base laterally likely representing a nipple shadow.  If there is clinical concern, repeat examination with nipple markers may be helpful.      Electronically signed by: Bal Catalan MD  Date:    05/04/2020  Time:    11:15             Narrative:    EXAMINATION:  XR CHEST AP PORTABLE    CLINICAL HISTORY:  elevated blood pressure reading;    TECHNIQUE:  Single frontal view of the chest was performed.    COMPARISON:  12/20/2016.    FINDINGS:  The heart is not enlarged.  Superior mediastinal structures are unremarkable.  Pulmonary vasculature is within normal limits.  There is  a nodular opacity projecting over the left lung base laterally likely representing a nipple shadow.  Lungs are otherwise clear.  There is no evidence for pneumothorax or large pleural effusions.  Bony structures are grossly intact.                                 Medical Decision Making:   History:   Old Medical Records: I decided to obtain old medical records.  Independently Interpreted Test(s):   I have ordered and independently interpreted X-rays - see prior notes.  I have ordered and independently interpreted EKG Reading(s) - see prior notes  Clinical Tests:   Lab Tests: Ordered and Reviewed  Radiological Study: Ordered and Reviewed  Medical Tests: Ordered and Reviewed  ED Management:  I am unsure of the etiology of this patient's symptoms, however, I do not believe they are of emergent etiology at this time. HEART score = 3 (low risk of MACE).  No evidence of heart failure, acute pancreatitis, acute cholecystitis.  Low suspicion for acute PE.  No significant electrolyte or metabolic abnormality.  No pneumonia, pneumothorax, or widening mediastinum.  Incidental questionable lesion noted on chest x-ray; discussed with patient.  Hepatic steatosis with several hepatic cysts discussed with patient who is already aware of these findings.    Patient has remained completely asymptomatic and well-appearing while in the ED. Advising follow-up with PCP and GI.  Strict return precautions discussed with patient who is agreeable to the plan.  Other:   I have discussed this case with another health care provider.       <> Summary of the Discussion: Discussed with attending                                 Clinical Impression:       ICD-10-CM ICD-9-CM   1. Epigastric abdominal pain R10.13 789.06   2. Elevated blood pressure reading R03.0 796.2   3. Abnormal chest x-ray R93.89 793.2   4. Hepatic steatosis K76.0 571.8   5. Hepatic cyst K76.89 573.8             ED Disposition Condition    Discharge Stable        ED Prescriptions      None        Follow-up Information     Follow up With Specialties Details Why Contact Info    Cornelius Bragg MD Internal Medicine Schedule an appointment as soon as possible for a visit in 1 day For re-evaluation [address]  [place] [address]  [phone]      [facility] GASTROENTEROLOGY Gastroenterology Schedule an appointment as soon as possible for a visit in 1 day For further evaluation of liver lesions [address] [facility] [address]  [phone]    [facility] Surgical Oncology, Orthopedic Surgery, Genetics, Physical Medicine and Rehabilitation, Occupational Therapy, Radiology Go in 1 day as an alternative to specialist evaluation [address] [facility] [address]  [phone]      [facility] Emergency Medicine Go to  If symptoms worsen [address] [facility] [id number] [phone]                                     Mode Melo PA-C  05/04/20 5810

## 2020-05-04 NOTE — ED TRIAGE NOTES
"Pt presents to ED, reports having recent high blood pressure readings at home. Reports recently starting irbesartan 4/30.     Pt reports having instances of intermittent epigastric burning    Pt denies any pain, SOB, N/V/D "I feel fine"    AAOx4       "

## 2020-05-06 ENCOUNTER — TELEPHONE (OUTPATIENT)
Dept: FAMILY MEDICINE | Facility: CLINIC | Age: 65
End: 2020-05-06

## 2020-05-06 RX ORDER — OMEPRAZOLE 40 MG/1
40 CAPSULE, DELAYED RELEASE ORAL DAILY
Qty: 90 CAPSULE | Refills: 4 | Status: SHIPPED | OUTPATIENT
Start: 2020-05-06 | End: 2023-02-13 | Stop reason: CLARIF

## 2020-05-06 NOTE — TELEPHONE ENCOUNTER
Hospital records reviewed and please call patient advise that his gallbladder looks perfectly normal and was really no reason for his abdominal pain seen in the ER workup.      It looks like the pain was in the upper stomach and perhaps this is your stomach and you might need acid reducer.     Dr. CAMPBELL will call and some prescription strength Prilosec.  Also use some antacids, Maalox and so forth and see if that helps.

## 2020-05-06 NOTE — TELEPHONE ENCOUNTER
Spoke with the patient and he was seen on 5/4/20 in the ED.  Patient was told his problem is his gallbladder or gallstones.  Patient states he has been in pain for the last 9 days and his pain level is an 8.  Pt wants to know when  Can he get this removed.  Patient has concerns about being off, due to his job.  Please advise

## 2020-05-06 NOTE — TELEPHONE ENCOUNTER
----- Message from Elena Ascencio sent at 5/6/2020 10:09 AM CDT -----  Contact: BRANDY OLIVA [[id number]]  Type: Patient Call Back    Who called: BRANDY OLIVA [[id number]]    What is the request in detail: BRANDY OLIVA [[id number]] is requesting a call back. He states that he went to ED and he was advised that he is having issues with Gallbladder/Gallstones. He states that he is in pain and he would like to discuss.   Please advise.    Can the clinic reply by [facility]? No    Best call back number: [phone]    Additional Information: N/A

## 2020-05-06 NOTE — TELEPHONE ENCOUNTER
Hospital records reviewed and please call patient advise that his gallbladder looks perfectly normal and was really no reason for his abdominal pain seen in the ER workup.       It looks like the pain was in the upper stomach and perhaps this is your stomach and you might need acid reducer.      Dr. CAMPBELL will call and some prescription strength Prilosec.  Also use some antacids, Maalox and so forth and see if that.    Spoke with patient and informed of recommendations and patient verbalized understandings.

## 2020-06-26 ENCOUNTER — TELEPHONE (OUTPATIENT)
Dept: FAMILY MEDICINE | Facility: CLINIC | Age: 65
End: 2020-06-26

## 2020-06-26 NOTE — TELEPHONE ENCOUNTER
----- Message from Nury Mastersonlorenzohenny sent at 6/26/2020 12:50 PM CDT -----  Type:  Sooner Appointment Request    Patient is requesting a sooner appointment.  Patient declined first available appointment listed as well as another facility and provider .  Patient will not accept being placed on the waitlist and is requesting a message be sent to doctor.    Name of Caller: pt     When is the first available appointment? 07/24    Symptoms: discuss meds     Would the patient rather a call back or a response via [facility]? Call back     Best Call Back Number: [phone]    Additional Information: pt requesting a call back from nurse to request to be seen on his off day on 07/21.

## 2020-07-20 RX ORDER — HYDROCODONE BITARTRATE AND ACETAMINOPHEN 5; 325 MG/1; MG/1
1 TABLET ORAL EVERY 6 HOURS PRN
Qty: 120 TABLET | Refills: 0 | Status: SHIPPED | OUTPATIENT
Start: 2020-07-20 | End: 2020-11-16 | Stop reason: SDUPTHER

## 2020-07-20 RX ORDER — HYDROCODONE BITARTRATE AND ACETAMINOPHEN 5; 325 MG/1; MG/1
1 TABLET ORAL EVERY 6 HOURS PRN
Qty: 120 TABLET | Refills: 0 | Status: SHIPPED | OUTPATIENT
Start: 2020-08-20 | End: 2020-07-28 | Stop reason: SDUPTHER

## 2020-07-20 NOTE — TELEPHONE ENCOUNTER
----- Message from Esthela Ramirez sent at 7/20/2020  8:49 AM CDT -----  Type: RX Refill Request    Who Called: Self     Have you contacted your pharmacy: yes     Refill or New Rx: refill     RX Name and Strength: HYDROcodone-acetaminophen (NORCO) 5-325 mg per tablet    Is this a 30 day or 90 day RX: 90 day     Preferred Pharmacy with phone number: [id number] - [place], [place] - [address] AT [facility] [address] [phone] (Phone)  [phone] (Fax)    Local or Mail Order: local     Would the patient rather a call back or a response via [facility]? Call back     Best Call Back Number: [phone]

## 2020-07-28 ENCOUNTER — OFFICE VISIT (OUTPATIENT)
Dept: FAMILY MEDICINE | Facility: CLINIC | Age: 65
End: 2020-07-28
Payer: COMMERCIAL

## 2020-07-28 VITALS
HEIGHT: 75 IN | SYSTOLIC BLOOD PRESSURE: 128 MMHG | OXYGEN SATURATION: 96 % | WEIGHT: 188.25 LBS | DIASTOLIC BLOOD PRESSURE: 80 MMHG | BODY MASS INDEX: 23.41 KG/M2 | HEART RATE: 97 BPM | TEMPERATURE: 97 F

## 2020-07-28 DIAGNOSIS — G89.29 OTHER CHRONIC PAIN: Primary | ICD-10-CM

## 2020-07-28 DIAGNOSIS — C18.9 METASTATIC COLON CANCER TO LIVER: ICD-10-CM

## 2020-07-28 DIAGNOSIS — G89.3 CANCER RELATED PAIN: ICD-10-CM

## 2020-07-28 DIAGNOSIS — G89.29 CHRONIC PAIN OF BOTH ANKLES: ICD-10-CM

## 2020-07-28 DIAGNOSIS — Z86.010 HISTORY OF COLONIC POLYPS: ICD-10-CM

## 2020-07-28 DIAGNOSIS — M25.571 CHRONIC PAIN OF BOTH ANKLES: ICD-10-CM

## 2020-07-28 DIAGNOSIS — M25.572 CHRONIC PAIN OF BOTH ANKLES: ICD-10-CM

## 2020-07-28 DIAGNOSIS — I10 ESSENTIAL HYPERTENSION: ICD-10-CM

## 2020-07-28 DIAGNOSIS — C78.7 METASTATIC COLON CANCER TO LIVER: ICD-10-CM

## 2020-07-28 DIAGNOSIS — R10.13 EPIGASTRIC PAIN: ICD-10-CM

## 2020-07-28 PROCEDURE — 99999 PR PBB SHADOW E&M-EST. PATIENT-LVL III: CPT | Mod: PBBFAC,,, | Performed by: INTERNAL MEDICINE

## 2020-07-28 PROCEDURE — 3079F PR MOST RECENT DIASTOLIC BLOOD PRESSURE 80-89 MM HG: ICD-10-PCS | Mod: CPTII,S$GLB,, | Performed by: INTERNAL MEDICINE

## 2020-07-28 PROCEDURE — 99214 OFFICE O/P EST MOD 30 MIN: CPT | Mod: S$GLB,,, | Performed by: INTERNAL MEDICINE

## 2020-07-28 PROCEDURE — 3074F SYST BP LT 130 MM HG: CPT | Mod: CPTII,S$GLB,, | Performed by: INTERNAL MEDICINE

## 2020-07-28 PROCEDURE — 3074F PR MOST RECENT SYSTOLIC BLOOD PRESSURE < 130 MM HG: ICD-10-PCS | Mod: CPTII,S$GLB,, | Performed by: INTERNAL MEDICINE

## 2020-07-28 PROCEDURE — 1101F PT FALLS ASSESS-DOCD LE1/YR: CPT | Mod: CPTII,S$GLB,, | Performed by: INTERNAL MEDICINE

## 2020-07-28 PROCEDURE — 3008F BODY MASS INDEX DOCD: CPT | Mod: CPTII,S$GLB,, | Performed by: INTERNAL MEDICINE

## 2020-07-28 PROCEDURE — 3008F PR BODY MASS INDEX (BMI) DOCUMENTED: ICD-10-PCS | Mod: CPTII,S$GLB,, | Performed by: INTERNAL MEDICINE

## 2020-07-28 PROCEDURE — 99214 PR OFFICE/OUTPT VISIT, EST, LEVL IV, 30-39 MIN: ICD-10-PCS | Mod: S$GLB,,, | Performed by: INTERNAL MEDICINE

## 2020-07-28 PROCEDURE — 99999 PR PBB SHADOW E&M-EST. PATIENT-LVL III: ICD-10-PCS | Mod: PBBFAC,,, | Performed by: INTERNAL MEDICINE

## 2020-07-28 PROCEDURE — 1101F PR PT FALLS ASSESS DOC 0-1 FALLS W/OUT INJ PAST YR: ICD-10-PCS | Mod: CPTII,S$GLB,, | Performed by: INTERNAL MEDICINE

## 2020-07-28 PROCEDURE — 3079F DIAST BP 80-89 MM HG: CPT | Mod: CPTII,S$GLB,, | Performed by: INTERNAL MEDICINE

## 2020-07-28 RX ORDER — HYDROCODONE BITARTRATE AND ACETAMINOPHEN 5; 325 MG/1; MG/1
1 TABLET ORAL EVERY 6 HOURS PRN
Qty: 120 TABLET | Refills: 0 | Status: SHIPPED | OUTPATIENT
Start: 2020-08-20 | End: 2021-02-19 | Stop reason: SDUPTHER

## 2020-07-28 RX ORDER — HYDROCODONE BITARTRATE AND ACETAMINOPHEN 5; 325 MG/1; MG/1
1 TABLET ORAL EVERY 6 HOURS PRN
Qty: 120 TABLET | Refills: 0 | Status: CANCELLED | OUTPATIENT
Start: 2020-07-28

## 2020-07-28 RX ORDER — HYDROCODONE BITARTRATE AND ACETAMINOPHEN 5; 325 MG/1; MG/1
1 TABLET ORAL EVERY 6 HOURS PRN
Qty: 120 TABLET | Refills: 0 | Status: SHIPPED | OUTPATIENT
Start: 2020-09-19 | End: 2020-11-16 | Stop reason: SDUPTHER

## 2020-07-28 NOTE — PROGRESS NOTES
Chief complaint refills, follow-up on epigastric pain    65-year-old white male here to discuss several issues.  Patient has chronic pain.  Outlined in previous notes.  He has his refill for July not give him his August and September.  Allows him to function at work.  He has lot of arthritis in his ankles.  He will also have some [place] paperwork probably to do again at follow-up and he has is old paperwork.  We also reviewed his epigastric pain which brought him to the ER in May.  His lab work and ultrasound was negative.  He had some constipation and thought he might have been passing gallstones but reassured him there were no gallstones and was probably constipation that he says is getting better and he did have hard bowel movements.  He probably would not feel if he was passing gallstones but again did not have any gallstones.  He has no angina.  His EKG and troponins were negative.  Should he get any further severe chest pain and probably would be good for him to return to the emergency room.    We discussed issues related to the virus.  We reviewed his labs.  Due to the virus he probably is overdue for follow-up with Oncology which looks like it probably was a year and half ago February 2019 and that is when his last negative PET scan was.  I encouraged him to call and see if he can have his follow-up imaging done and then he can make follow-up with Oncology.      Counseled regarding all these issues with patient and controlled substance management was done by physician creating three prescriptions, changing the dates, sending to pharmacy and putting in all the appropriate authentic a shin codes all while online with patient.Total time over 25 minutes with over 50% counseling.    He does need his controlled substance management and refills done for the next three months.    Reviewed his labs.  Essentially is labs are due this time of year and so will update all of those.  His MCV was 100, his B12 level was low  normal and he started some B12.        Previous MRI  Impression       Interval right hepatic lobe ablation.  No residual enhancement at this site.    No new concerning liver lesions identified, noting limited evaluation due to innumerable underlying cysts of varying sizes.  Continued CT PET imaging for future follow-up recommended.      Electronically signed by: Eudin Castillo MD  Date: 2019           Review of systems: No fevers or chills, no other urinary complaints, no further GI complaints    PAST MEDICAL HISTORY:   1. ED.   2. HTN.   3. Smoker.   4. Allergic rhinitis with exposure to mold   5. Frequent bronchitis   6. Lumbar strain due to work on [employer]   7. Large colon polyp removed before age 50 - over 10-15 -yrs  8.  Chronic pain referable to ankle arthritis   9.  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 One younger brother with no medical problems        Tr was seen today for medication refill.    Diagnoses and all orders for this visit:    Other chronic pain, controlled substance management done    Chronic pain of both ankles    Metastatic colon cancer to liver, prior imaging reviewed, probably needs to repeat imaging follow-up with Oncology    Cancer related pain    Essential hypertension, chronic and stable and not monitoring at home, pain was probably increasing his blood pressure when he was sick and may    Epigastric pain, very likely GI, he has been off the PPI for about a week and he is to watch for any return of symptoms, restart PPI, use antacids and so forth.  No signs of any bleeding but could well have had gastritis her nonbleeding peptic ulcer disease.    History of colonic polyps, reviewed prior reports, he will be due for three year follow-up at the end of     Other  orders  -     Cancel: HYDROcodone-acetaminophen (NORCO) 5-325 mg per tablet; Take 1 tablet by mouth every 6 (six) hours as needed for Pain. :MORE THAN A SEVEN DAY SUPPLY OF OPIOID IS MEDICALLY NECESSARY.  -     HYDROcodone-acetaminophen (NORCO) 5-325 mg per tablet; Take 1 tablet by mouth every 6 (six) hours as needed for Pain.  -     HYDROcodone-acetaminophen (NORCO) 5-325 mg per tablet; Take 1 tablet by mouth every 6 (six) hours as needed for Pain (Chronic pain, G89.4). :MORE THAN A SEVEN DAY SUPPLY OF OPIOID IS MEDICALLY NECESSARY.

## 2020-07-29 ENCOUNTER — TELEPHONE (OUTPATIENT)
Dept: FAMILY MEDICINE | Facility: CLINIC | Age: 65
End: 2020-07-29

## 2020-07-29 NOTE — TELEPHONE ENCOUNTER
----- Message from Nancy Srivastava sent at 7/29/2020 11:03 AM CDT -----  Regarding: Self/  [phone]  Type: Patient Call Back    Who called:  Patient    What is the request in detail:  Patient is needing a return to work note, to return today.  Please fax to [phone].   Atten Juliet Leslie .Thank you    Would the patient rather a call back or a response via [facility]? Call back    Best call back number:  [phone]

## 2020-07-31 ENCOUNTER — TELEPHONE (OUTPATIENT)
Dept: FAMILY MEDICINE | Facility: CLINIC | Age: 65
End: 2020-07-31

## 2020-07-31 NOTE — TELEPHONE ENCOUNTER
----- Message from Esthela Ramirez sent at 7/31/2020 12:21 PM CDT -----  Type:  Patient Returning Call    Who Called: Self     Who Left Message for Patient: Kristi Luke     Does the patient know what this is regarding?:Call back     Would the patient rather a call back or a response via [facility]? Call back     Best Call Back Number: [phone]

## 2020-11-16 ENCOUNTER — OFFICE VISIT (OUTPATIENT)
Dept: FAMILY MEDICINE | Facility: CLINIC | Age: 65
End: 2020-11-16
Payer: COMMERCIAL

## 2020-11-16 VITALS
DIASTOLIC BLOOD PRESSURE: 60 MMHG | SYSTOLIC BLOOD PRESSURE: 140 MMHG | TEMPERATURE: 98 F | WEIGHT: 183.88 LBS | HEART RATE: 81 BPM | HEIGHT: 75 IN | OXYGEN SATURATION: 99 % | BODY MASS INDEX: 22.86 KG/M2

## 2020-11-16 DIAGNOSIS — I10 ESSENTIAL HYPERTENSION: ICD-10-CM

## 2020-11-16 DIAGNOSIS — C78.7 METASTATIC COLON CANCER TO LIVER: ICD-10-CM

## 2020-11-16 DIAGNOSIS — F11.20 OPIATE DEPENDENCE, CONTINUOUS: ICD-10-CM

## 2020-11-16 DIAGNOSIS — C18.9 METASTATIC COLON CANCER TO LIVER: ICD-10-CM

## 2020-11-16 DIAGNOSIS — G89.3 CANCER RELATED PAIN: ICD-10-CM

## 2020-11-16 DIAGNOSIS — M25.571 CHRONIC PAIN OF BOTH ANKLES: ICD-10-CM

## 2020-11-16 DIAGNOSIS — G89.29 OTHER CHRONIC PAIN: Primary | ICD-10-CM

## 2020-11-16 DIAGNOSIS — M25.572 CHRONIC PAIN OF BOTH ANKLES: ICD-10-CM

## 2020-11-16 DIAGNOSIS — G89.29 CHRONIC PAIN OF BOTH ANKLES: ICD-10-CM

## 2020-11-16 PROCEDURE — 3078F PR MOST RECENT DIASTOLIC BLOOD PRESSURE < 80 MM HG: ICD-10-PCS | Mod: CPTII,S$GLB,, | Performed by: INTERNAL MEDICINE

## 2020-11-16 PROCEDURE — 3008F PR BODY MASS INDEX (BMI) DOCUMENTED: ICD-10-PCS | Mod: CPTII,S$GLB,, | Performed by: INTERNAL MEDICINE

## 2020-11-16 PROCEDURE — 99214 OFFICE O/P EST MOD 30 MIN: CPT | Mod: 25,S$GLB,, | Performed by: INTERNAL MEDICINE

## 2020-11-16 PROCEDURE — 3008F BODY MASS INDEX DOCD: CPT | Mod: CPTII,S$GLB,, | Performed by: INTERNAL MEDICINE

## 2020-11-16 PROCEDURE — 3288F PR FALLS RISK ASSESSMENT DOCUMENTED: ICD-10-PCS | Mod: CPTII,S$GLB,, | Performed by: INTERNAL MEDICINE

## 2020-11-16 PROCEDURE — 3288F FALL RISK ASSESSMENT DOCD: CPT | Mod: CPTII,S$GLB,, | Performed by: INTERNAL MEDICINE

## 2020-11-16 PROCEDURE — 90471 FLU VACCINE - QUADRIVALENT - ADJUVANTED: ICD-10-PCS | Mod: S$GLB,,, | Performed by: INTERNAL MEDICINE

## 2020-11-16 PROCEDURE — 3078F DIAST BP <80 MM HG: CPT | Mod: CPTII,S$GLB,, | Performed by: INTERNAL MEDICINE

## 2020-11-16 PROCEDURE — 1101F PR PT FALLS ASSESS DOC 0-1 FALLS W/OUT INJ PAST YR: ICD-10-PCS | Mod: CPTII,S$GLB,, | Performed by: INTERNAL MEDICINE

## 2020-11-16 PROCEDURE — 90694 FLU VACCINE - QUADRIVALENT - ADJUVANTED: ICD-10-PCS | Mod: S$GLB,,, | Performed by: INTERNAL MEDICINE

## 2020-11-16 PROCEDURE — 3077F PR MOST RECENT SYSTOLIC BLOOD PRESSURE >= 140 MM HG: ICD-10-PCS | Mod: CPTII,S$GLB,, | Performed by: INTERNAL MEDICINE

## 2020-11-16 PROCEDURE — 90471 IMMUNIZATION ADMIN: CPT | Mod: S$GLB,,, | Performed by: INTERNAL MEDICINE

## 2020-11-16 PROCEDURE — 99214 PR OFFICE/OUTPT VISIT, EST, LEVL IV, 30-39 MIN: ICD-10-PCS | Mod: 25,S$GLB,, | Performed by: INTERNAL MEDICINE

## 2020-11-16 PROCEDURE — 99999 PR PBB SHADOW E&M-EST. PATIENT-LVL III: ICD-10-PCS | Mod: PBBFAC,,, | Performed by: INTERNAL MEDICINE

## 2020-11-16 PROCEDURE — 90694 VACC AIIV4 NO PRSRV 0.5ML IM: CPT | Mod: S$GLB,,, | Performed by: INTERNAL MEDICINE

## 2020-11-16 PROCEDURE — 3077F SYST BP >= 140 MM HG: CPT | Mod: CPTII,S$GLB,, | Performed by: INTERNAL MEDICINE

## 2020-11-16 PROCEDURE — 99999 PR PBB SHADOW E&M-EST. PATIENT-LVL III: CPT | Mod: PBBFAC,,, | Performed by: INTERNAL MEDICINE

## 2020-11-16 PROCEDURE — 1101F PT FALLS ASSESS-DOCD LE1/YR: CPT | Mod: CPTII,S$GLB,, | Performed by: INTERNAL MEDICINE

## 2020-11-16 RX ORDER — HYDROCODONE BITARTRATE AND ACETAMINOPHEN 5; 325 MG/1; MG/1
1 TABLET ORAL EVERY 6 HOURS PRN
Qty: 120 TABLET | Refills: 0 | Status: SHIPPED | OUTPATIENT
Start: 2020-12-16 | End: 2021-02-19 | Stop reason: SDUPTHER

## 2020-11-16 RX ORDER — HYDROCODONE BITARTRATE AND ACETAMINOPHEN 5; 325 MG/1; MG/1
1 TABLET ORAL EVERY 6 HOURS PRN
Qty: 120 TABLET | Refills: 0 | Status: SHIPPED | OUTPATIENT
Start: 2021-01-16 | End: 2021-02-19 | Stop reason: SDUPTHER

## 2020-11-16 RX ORDER — HYDROCODONE BITARTRATE AND ACETAMINOPHEN 5; 325 MG/1; MG/1
1 TABLET ORAL EVERY 6 HOURS PRN
Qty: 120 TABLET | Refills: 0 | Status: SHIPPED | OUTPATIENT
Start: 2020-11-16 | End: 2021-05-20 | Stop reason: SDUPTHER

## 2020-11-16 NOTE — PROGRESS NOTES
Chief complaint refills,  Delete that paperwork    65-year-old white male here to discuss several issues.  Patient has chronic pain.  Outlined in previous notes.  .  Pain meds Allow him to function at work.  He has lot of arthritis in his ankles.  He will also have some FMLA paperwork probably to do again at follow-up and he has is old paperwork.  Yearly paperwork.  I reviewed the old when and the new one and completed it.  He has a colonoscopy scheduled with [facility].  Based on the findings he may well obviously need to see Oncology and Radiation Oncology again although he would be reluctant to pursue any further radiation and may even not be a candidate for further radiation.     We also reviewed his epigastric pain which brought him to the ER in May.  His lab work and ultrasound was negative.  He had some constipation and thought he might have been passing gallstones but reassured him there were no gallstones and was probably constipation that he says is getting better and he did have hard bowel movements.  He probably would not feel if he was passing gallstones but again did not have any gallstones.  He has no angina.  His EKG and troponins were negative.  Should he get any further severe chest pain and probably would be good for him to return to the emergency room.    We discussed issues related to the virus.  We reviewed his labs.  Due to the virus he probably is overdue for follow-up with Oncology which looks like it probably was a year and half ago February 2019 and that is when his last negative PET scan was.  I encouraged him to call and see if he can have his follow-up imaging done and then he can make follow-up with Oncology.      Counseled regarding all these issues with patient and controlled substance management was done by physician creating three prescriptions, changing the dates, sending to pharmacy and putting in all the appropriate  codes all while online with patient.Total time over 25 minutes  with over 50% counseling.    He does need his controlled substance management and refills done for the next three months.    Reviewed his labs.  Essentially is labs are due this time of year and so will update all of those.  His MCV was 100, his B12 level was low normal and he started some B12.    PSA was  , when he returns for his prescription management in February will do all his lab work and his physical    Previous MRI  Impression       Interval right hepatic lobe ablation.  No residual enhancement at this site.    No new concerning liver lesions identified, noting limited evaluation due to innumerable underlying cysts of varying sizes.  Continued CT PET imaging for future follow-up recommended.      Electronically signed by: Eduin Castillo MD  Date: 2019           Review of systems: No fevers or chills, no other urinary complaints, no further GI complaints    PAST MEDICAL HISTORY:   1. ED.   2. HTN.   3. Smoker.   4. Allergic rhinitis with exposure to mold   5. Frequent bronchitis   6. Lumbar strain due to work on [employer]   7. Large colon polyp removed before age 50 - over 10-15 -yrs  8.  Chronic pain referable to ankle arthritis   9.  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 One younger brother with no medical problems               Tr was seen today for annual exam.    Diagnoses and all orders for this visit:    Other chronic pain, controlled substance management and monitoring done, [place] paperwork filled out as it relates to his cancer diagnosis as well as his chronic arthritic pain in both ankles    Chronic pain of both ankles    Metastatic colon cancer to liver    Cancer related pain    Essential hypertension, chronic and stable    Opiate dependence, continuous    Other orders  -      Influenza - Quadrivalent (Adjuvanted)  -     HYDROcodone-acetaminophen (NORCO) 5-325 mg per tablet; Take 1 tablet by mouth every 6 (six) hours as needed for Pain. :MORE THAN A SEVEN DAY SUPPLY OF OPIOID IS MEDICALLY NECESSARY.  -     HYDROcodone-acetaminophen (NORCO) 5-325 mg per tablet; Take 1 tablet by mouth every 6 (six) hours as needed for Pain (Chronic pain, G89.4). :MORE THAN A SEVEN DAY SUPPLY OF OPIOID IS MEDICALLY NECESSARY.  -     HYDROcodone-acetaminophen (NORCO) 5-325 mg per tablet; Take 1 tablet by mouth every 6 (six) hours as needed for Pain.

## 2020-12-03 DIAGNOSIS — Z12.11 ENCOUNTER FOR SCREENING COLONOSCOPY FOR NON-HIGH-RISK PATIENT: Primary | ICD-10-CM

## 2020-12-03 RX ORDER — SODIUM, POTASSIUM,MAG SULFATES 17.5-3.13G
1 SOLUTION, RECONSTITUTED, ORAL ORAL DAILY
Qty: 1 KIT | Refills: 0 | Status: SHIPPED | OUTPATIENT
Start: 2020-12-03 | End: 2020-12-04

## 2020-12-19 ENCOUNTER — CLINICAL SUPPORT (OUTPATIENT)
Dept: URGENT CARE | Facility: CLINIC | Age: 65
End: 2020-12-19
Payer: COMMERCIAL

## 2020-12-19 DIAGNOSIS — Z03.818 ENCOUNTER FOR OBSERVATION FOR SUSPECTED EXPOSURE TO OTHER BIOLOGICAL AGENTS RULED OUT: ICD-10-CM

## 2020-12-19 PROCEDURE — 99211 PR OFFICE/OUTPT VISIT, EST, LEVL I: ICD-10-PCS | Mod: S$GLB,,, | Performed by: PHYSICIAN ASSISTANT

## 2020-12-19 PROCEDURE — 99211 OFF/OP EST MAY X REQ PHY/QHP: CPT | Mod: S$GLB,,, | Performed by: PHYSICIAN ASSISTANT

## 2020-12-19 PROCEDURE — U0003 INFECTIOUS AGENT DETECTION BY NUCLEIC ACID (DNA OR RNA); SEVERE ACUTE RESPIRATORY SYNDROME CORONAVIRUS 2 (SARS-COV-2) (CORONAVIRUS DISEASE [COVID-19]), AMPLIFIED PROBE TECHNIQUE, MAKING USE OF HIGH THROUGHPUT TECHNOLOGIES AS DESCRIBED BY CMS-2020-01-R: HCPCS

## 2020-12-20 LAB — SARS-COV-2 RNA RESP QL NAA+PROBE: NOT DETECTED

## 2020-12-22 ENCOUNTER — ANESTHESIA EVENT (OUTPATIENT)
Dept: ENDOSCOPY | Facility: HOSPITAL | Age: 65
End: 2020-12-22
Payer: COMMERCIAL

## 2020-12-22 ENCOUNTER — HOSPITAL ENCOUNTER (OUTPATIENT)
Facility: HOSPITAL | Age: 65
Discharge: HOME | End: 2020-12-22
Attending: INTERNAL MEDICINE | Admitting: INTERNAL MEDICINE
Payer: COMMERCIAL

## 2020-12-22 ENCOUNTER — ANESTHESIA (OUTPATIENT)
Dept: ENDOSCOPY | Facility: HOSPITAL | Age: 65
End: 2020-12-22
Payer: COMMERCIAL

## 2020-12-22 VITALS
TEMPERATURE: 98 F | SYSTOLIC BLOOD PRESSURE: 144 MMHG | RESPIRATION RATE: 17 BRPM | HEART RATE: 77 BPM | OXYGEN SATURATION: 100 % | DIASTOLIC BLOOD PRESSURE: 73 MMHG

## 2020-12-22 DIAGNOSIS — Z12.11 COLON CANCER SCREENING: ICD-10-CM

## 2020-12-22 DIAGNOSIS — K63.5 POLYP OF COLON, UNSPECIFIED PART OF COLON, UNSPECIFIED TYPE: ICD-10-CM

## 2020-12-22 DIAGNOSIS — Z85.038 HISTORY OF COLON CANCER: Primary | ICD-10-CM

## 2020-12-22 PROCEDURE — 63600175 PHARM REV CODE 636 W HCPCS: Performed by: NURSE ANESTHETIST, CERTIFIED REGISTERED

## 2020-12-22 PROCEDURE — 88305 TISSUE EXAM BY PATHOLOGIST: ICD-10-PCS | Mod: 26,,, | Performed by: PATHOLOGY

## 2020-12-22 PROCEDURE — D9220A PRA ANESTHESIA: Mod: 33,ANES,, | Performed by: ANESTHESIOLOGY

## 2020-12-22 PROCEDURE — D9220A PRA ANESTHESIA: ICD-10-PCS | Mod: 33,CRNA,, | Performed by: NURSE ANESTHETIST, CERTIFIED REGISTERED

## 2020-12-22 PROCEDURE — D9220A PRA ANESTHESIA: ICD-10-PCS | Mod: 33,ANES,, | Performed by: ANESTHESIOLOGY

## 2020-12-22 PROCEDURE — 37000008 HC ANESTHESIA 1ST 15 MINUTES: Performed by: INTERNAL MEDICINE

## 2020-12-22 PROCEDURE — 45380 COLONOSCOPY AND BIOPSY: CPT | Performed by: INTERNAL MEDICINE

## 2020-12-22 PROCEDURE — 27201012 HC FORCEPS, HOT/COLD, DISP: Performed by: INTERNAL MEDICINE

## 2020-12-22 PROCEDURE — 45385 COLONOSCOPY W/LESION REMOVAL: CPT | Performed by: INTERNAL MEDICINE

## 2020-12-22 PROCEDURE — D9220A PRA ANESTHESIA: Mod: 33,CRNA,, | Performed by: NURSE ANESTHETIST, CERTIFIED REGISTERED

## 2020-12-22 PROCEDURE — 37000009 HC ANESTHESIA EA ADD 15 MINS: Performed by: INTERNAL MEDICINE

## 2020-12-22 PROCEDURE — 88305 TISSUE EXAM BY PATHOLOGIST: CPT | Mod: 26,,, | Performed by: PATHOLOGY

## 2020-12-22 PROCEDURE — 27201089 HC SNARE, DISP (ANY): Performed by: INTERNAL MEDICINE

## 2020-12-22 PROCEDURE — 88305 TISSUE EXAM BY PATHOLOGIST: CPT | Performed by: PATHOLOGY

## 2020-12-22 PROCEDURE — 25000003 PHARM REV CODE 250: Performed by: NURSE ANESTHETIST, CERTIFIED REGISTERED

## 2020-12-22 PROCEDURE — 25000003 PHARM REV CODE 250: Performed by: INTERNAL MEDICINE

## 2020-12-22 RX ORDER — PROPOFOL 10 MG/ML
VIAL (ML) INTRAVENOUS
Status: DISCONTINUED | OUTPATIENT
Start: 2020-12-22 | End: 2020-12-22

## 2020-12-22 RX ORDER — LIDOCAINE HYDROCHLORIDE 20 MG/ML
INJECTION, SOLUTION EPIDURAL; INFILTRATION; INTRACAUDAL; PERINEURAL
Status: DISCONTINUED
Start: 2020-12-22 | End: 2020-12-22 | Stop reason: HOSPADM

## 2020-12-22 RX ORDER — SODIUM CHLORIDE 9 MG/ML
INJECTION, SOLUTION INTRAVENOUS ONCE
Status: COMPLETED | OUTPATIENT
Start: 2020-12-22 | End: 2020-12-22

## 2020-12-22 RX ORDER — PROPOFOL 10 MG/ML
INJECTION, EMULSION INTRAVENOUS
Status: DISCONTINUED
Start: 2020-12-22 | End: 2020-12-22 | Stop reason: HOSPADM

## 2020-12-22 RX ORDER — LIDOCAINE HYDROCHLORIDE 20 MG/ML
INJECTION INTRAVENOUS
Status: DISCONTINUED | OUTPATIENT
Start: 2020-12-22 | End: 2020-12-22

## 2020-12-22 RX ADMIN — PROPOFOL 20 MG: 10 INJECTION, EMULSION INTRAVENOUS at 08:12

## 2020-12-22 RX ADMIN — PROPOFOL 80 MG: 10 INJECTION, EMULSION INTRAVENOUS at 08:12

## 2020-12-22 RX ADMIN — Medication 60 MG: at 08:12

## 2020-12-22 RX ADMIN — SODIUM CHLORIDE 50 ML/HR: 0.9 INJECTION, SOLUTION INTRAVENOUS at 07:12

## 2020-12-22 NOTE — [employer] PATIENT INSTRUCTIONS
Discharge Summary/Instructions after an Endoscopic Procedure  Patient Name: Tr Chen  Patient MRN: [id number]  Patient YOB: 1955 Tuesday, December 22, 2020  Portillo Mcclelland MD  RESTRICTIONS:  During your procedure today, you received medications for sedation.  These   medications may affect your judgment, balance and coordination.  Therefore,   for 24 hours, you have the following restrictions:   - DO NOT drive a car, operate machinery, make legal/financial decisions,   sign important papers or drink alcohol.    ACTIVITY:  Today: no heavy lifting, straining or running due to procedural   sedation/anesthesia.  The following day: return to full activity including work.  DIET:  Eat and drink normally unless instructed otherwise.     TREATMENT FOR COMMON SIDE EFFECTS:  - Mild abdominal pain, nausea, belching, bloating or excessive gas:  rest,   eat lightly and use a heating pad.  - Sore Throat: treat with throat lozenges and/or gargle with warm salt   water.  - Because air was used during the procedure, expelling large amounts of air   from your rectum or belching is normal.  - If a bowel prep was taken, you may not have a bowel movement for 1-3 days.    This is normal.  SYMPTOMS TO WATCH FOR AND REPORT TO YOUR PHYSICIAN:  1. Abdominal pain or bloating, other than gas cramps.  2. Chest pain.  3. Back pain.  4. Signs of infection such as: chills or fever occurring within 24 hours   after the procedure.  5. Rectal bleeding, which would show as bright red, maroon, or black stools.   (A tablespoon of blood from the rectum is not serious, especially if   hemorrhoids are present.)  6. Vomiting.  7. Weakness or dizziness.  GO DIRECTLY TO THE NEAREST EMERGENCY ROOM IF YOU HAVE ANY OF THE FOLLOWING:      Difficulty breathing              Chills and/or fever over 101 F   Persistent vomiting and/or vomiting blood   Severe abdominal pain   Severe chest pain   Black, tarry stools   Bleeding- more than one  tablespoon   Any other symptom or condition that you feel may need urgent attention  Your doctor recommends these additional instructions:  If any biopsies were taken, your doctors clinic will contact you in 1 to 2   weeks with any results.  - Discharge patient to home.   - Resume previous diet.   - Continue present medications.   - Await pathology results.   - Repeat colonoscopy within 6 months because the bowel preparation was   suboptimal.   - Return to referring physician.  For questions, problems or results please call your physician - Portillo Mcclelland MD at Work:  [phone].  [facility] can be reached at [phone]     IF A COMPLICATION OR EMERGENCY SITUATION ARISES AND YOU ARE UNABLE TO REACH   YOUR PHYSICIAN - GO DIRECTLY TO THE EMERGENCY ROOM.  Portillo Mcclelland MD  12/22/2020 8:37:14 AM  This report has been verified and signed electronically.  [employer]

## 2020-12-22 NOTE — H&P
Gastroenterology H&P     12/22/2020      Chief complaint: colonoscopy      HPI:  Tr Chen  is a 65 y.o. man with a history of colon cancer dx 11/2016 s/p resection/chemo/xrt who presents for surveillance colonoscopy.  Last colonoscopy was in 2017 at which time 2 <1cm transverse colon polyps were removed.  Denies bleeding, diarrhea, constipation, abdominal pain.       No family hx of crc.     ROS:  No chest pain or SOB.     Past Medical History:   Diagnosis Date    Cancer     metastatic colon cancer    Chronic heel pain     Bilateral    Chronic pain 11/20/2012    Erectile dysfunction 5/16/2014    History of chemotherapy 11/28/2017    History of colonic polyps 7/16/2012    HTN (hypertension) 7/16/2012    Hypertension     Liver lesion     October biopsy showed liver mets    Tobacco use disorder 1/19/2015       Past Surgical History:   Procedure Laterality Date    COLON SURGERY  12/27/2016    colon resection    COLONOSCOPY N/A 11/21/2016    Procedure: COLONOSCOPY;  Surgeon: Jono Resendez MD;  Location: [facility] ENDO;  Service: Endoscopy;  Laterality: N/A;  needs scope done 2/24/16  -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     COLONOSCOPY N/A 12/27/2017    Procedure: COLONOSCOPY;  Surgeon: Jono Resendez MD;  Location: [facility] ENDO;  Service: Endoscopy;  Laterality: N/A;    FRACTURE SURGERY      both feet    LIVER BIOPSY N/A 10/18/2018    Procedure: BIOPSY, LIVER;  Surgeon: Zaire Diagnostic Provider;  Location: [facility] OR;  Service: Radiology;  Laterality: N/A;  9AM START  RN PREOP 10/12/2018    TONSILLECTOMY         Social History     Socioeconomic History    Marital status:      Spouse name: Not on file    Number of children: Not on file    Years of education: Not on file    Highest education level: Not on file   Occupational History    Not on file   Social Needs    Financial resource strain: Not on file    Food insecurity     Worry: Not on file     Inability: Not on file    Transportation needs      Medical: Not on file     Non-medical: Not on file   Tobacco Use    Smoking status: Current Every Day Smoker     Packs/day: 0.50     Years: 30.00     Pack years: 15.00     Types: Cigarettes    Smokeless tobacco: Never Used   Substance and Sexual Activity    Alcohol use: Yes     Comment: social    Drug use: No    Sexual activity: Not on file   Lifestyle    Physical activity     Days per week: Not on file     Minutes per session: Not on file    Stress: Not on file   Relationships    Social connections     Talks on phone: Not on file     Gets together: Not on file     Attends Yazdanism service: Not on file     Active member of club or organization: Not on file     Attends meetings of clubs or organizations: Not on file     Relationship status: Not on file   Other Topics Concern    Not on file   Social History Narrative    Not on file       Family History   Problem Relation Age of Onset    Cancer Mother         breast       No current facility-administered medications on file prior to encounter.      Current Outpatient Medications on File Prior to Encounter   Medication Sig Dispense Refill    HYDROcodone-acetaminophen (NORCO) 5-325 mg per tablet Take 1 tablet by mouth every 6 (six) hours as needed for Pain (Chronic pain, G89.4). :MORE THAN A SEVEN DAY SUPPLY OF OPIOID IS MEDICALLY NECESSARY. 120 tablet 0    [START ON 1/16/2021] HYDROcodone-acetaminophen (NORCO) 5-325 mg per tablet Take 1 tablet by mouth every 6 (six) hours as needed for Pain. :MORE THAN A SEVEN DAY SUPPLY OF OPIOID IS MEDICALLY NECESSARY. 120 tablet 0    HYDROcodone-acetaminophen (NORCO) 5-325 mg per tablet Take 1 tablet by mouth every 6 (six) hours as needed for Pain (Chronic pain, G89.4). :MORE THAN A SEVEN DAY SUPPLY OF OPIOID IS MEDICALLY NECESSARY. 120 tablet 0    HYDROcodone-acetaminophen (NORCO) 5-325 mg per tablet Take 1 tablet by mouth every 6 (six) hours as needed for Pain. 120 tablet 0    insulin syringe-needle U-100 1 mL  31 gauge x 5/16 Syrg Use as directed with ICI Therapy 10 each 12    irbesartan (AVAPRO) 150 MG tablet Take 1 tablet (150 mg total) by mouth every evening. 90 tablet 3    omeprazole (PRILOSEC) 40 MG capsule Take 1 capsule (40 mg total) by mouth once daily. 90 capsule 4    ondansetron (ZOFRAN) 4 MG tablet Take 1 tablet (4 mg total) by mouth every 6 (six) hours as needed for Nausea. 28 tablet 0    papaverine 30 mg/mL injection Add: Phentolamine 1 mg  Add: PGE1 10 mcg    Sig:  Inject 20 units (0.20 mls) as directed 10 mL 11    sildenafil (VIAGRA) 100 MG tablet Take 1 tablet (100 mg total) by mouth daily as needed for Erectile Dysfunction. half -1 Tablet Oral .  Take 30 min before anticipated need. 12 tablet 12    tadalafil (CIALIS) 20 MG Tab Take 1 tablet (20 mg total) by mouth every 36 (thirty-six) hours. 12 tablet 11       Review of patient's allergies indicates:   Allergen Reactions    No known allergies         There were no vitals filed for this visit.    GEN: NAD, pleasant, well appearing  HEENT: NCAT, eomi, anicteric sclera, mmm  CV: reg rate and rhythm  PULM: CTAB  ABD: soft, nt/nd  EXT: warm, no edema      Assessment:   1. Hx of colon cancer     Recommendations:  - Colonoscopy today.        Portillo Mcclelland MD

## 2020-12-22 NOTE — TRANSFER OF CARE
Anesthesia Transfer of Care Note    Patient: Tr Chen    Procedure(s) Performed: Procedure(s) (LRB):  COLONOSCOPY (N/A)    Patient location: GI    Anesthesia Type: MAC    Transport from OR: Transported from OR on room air with adequate spontaneous ventilation    Post pain: adequate analgesia    Post assessment: no apparent anesthetic complications and tolerated procedure well    Post vital signs: stable    Level of consciousness: awake, alert and oriented    Nausea/Vomiting: no nausea/vomiting    Complications: none    Transfer of care protocol was followed      Last vitals:   Visit Vitals  BP (!) 144/73 (BP Location: Left arm, Patient Position: Lying)   Pulse 77   Temp 36.8 °C (98.2 °F) (Oral)   Resp 17   SpO2 100%
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
## 2020-12-28 LAB
FINAL PATHOLOGIC DIAGNOSIS: NORMAL
GROSS: NORMAL
Lab: NORMAL

## 2020-12-28 NOTE — ANESTHESIA POSTPROCEDURE EVALUATION
Anesthesia Post Evaluation    Patient: Tr Chen    Procedure(s) Performed: Procedure(s) (LRB):  COLONOSCOPY (N/A)    Final Anesthesia Type: general      Patient location during evaluation: GI PACU  Patient participation: Yes- Able to Participate  Level of consciousness: awake and alert  Post-procedure vital signs: reviewed and stable  Pain management: adequate  Airway patency: patent    PONV status at discharge: No PONV  Anesthetic complications: no      Cardiovascular status: blood pressure returned to baseline and hemodynamically stable  Respiratory status: unassisted and spontaneous ventilation  Hydration status: euvolemic  Follow-up not needed.          Vitals Value Taken Time   /73 12/22/20 0831   Temp 36.8 °C (98.2 °F) 12/22/20 0831   Pulse 77 12/22/20 0831   Resp 17 12/22/20 0831   SpO2 100 % 12/22/20 0831         Event Time   Out of Recovery 08:58:00         Pain/Anuradha Score: No data recorded

## 2020-12-28 NOTE — ANESTHESIA PREPROCEDURE EVALUATION
12/28/2020  Tr Chen is a 65 y.o., male.    Anesthesia Evaluation     I have reviewed the Nursing Notes.       Review of Systems  Anesthesia Hx:  No problems with previous Anesthesia   Social:  Smoker    EENT/Dental:EENT/Dental Normal   Cardiovascular:   Denies Pacemaker. Hypertension  Denies CABG/stent.  Denies Dysrhythmias.     Pulmonary:  Pulmonary Normal    Renal/:   BPH    Hepatic/GI:   Bowel Prep. Liver Disease,    Neurological:   Chronic Pain Syndrome   Endocrine:   Diabetes, type 2, using insulin Denies Hypothyroidism. Denies Hyperthyroidism.        Physical Exam  General:  Well nourished    Airway/Jaw/Neck:  AIRWAY FINDINGS: Normal      Chest/Lungs:  Chest/Lungs Clear    Heart/Vascular:  Heart Findings: Normal       Mental Status:  Mental Status Findings: Normal        Anesthesia Plan  Type of Anesthesia, risks & benefits discussed:  Anesthesia Type:  general  Patient's Preference:   Intra-op Monitoring Plan: standard ASA monitors  Intra-op Monitoring Plan Comments:   Post Op Pain Control Plan:   Post Op Pain Control Plan Comments:   Induction:   IV  Beta Blocker:  Patient is not currently on a Beta-Blocker (No further documentation required).       Informed Consent: Patient understands risks and agrees with Anesthesia plan.  Questions answered. Anesthesia consent signed with patient.  ASA Score: 2     Day of Surgery Review of History & Physical:  There are no significant changes.  H&P update referred to the provider.         Ready For Surgery From Anesthesia Perspective.

## 2021-02-03 ENCOUNTER — TELEPHONE (OUTPATIENT)
Dept: UROLOGY | Facility: CLINIC | Age: 66
End: 2021-02-03

## 2021-02-03 DIAGNOSIS — N52.35 ERECTILE DYSFUNCTION FOLLOWING RADIATION THERAPY: ICD-10-CM

## 2021-02-03 RX ORDER — PAPAVERINE HYDROCHLORIDE 30 MG/ML
INJECTION INTRAMUSCULAR; INTRAVENOUS
Qty: 10 ML | Refills: 0 | Status: SHIPPED | OUTPATIENT
Start: 2021-02-03 | End: 2023-01-19 | Stop reason: SDUPTHER

## 2021-02-09 ENCOUNTER — PATIENT OUTREACH (OUTPATIENT)
Dept: ADMINISTRATIVE | Facility: HOSPITAL | Age: 66
End: 2021-02-09

## 2021-02-19 ENCOUNTER — OFFICE VISIT (OUTPATIENT)
Dept: FAMILY MEDICINE | Facility: CLINIC | Age: 66
End: 2021-02-19
Payer: COMMERCIAL

## 2021-02-19 VITALS
OXYGEN SATURATION: 98 % | WEIGHT: 187.19 LBS | SYSTOLIC BLOOD PRESSURE: 130 MMHG | TEMPERATURE: 98 F | DIASTOLIC BLOOD PRESSURE: 84 MMHG | BODY MASS INDEX: 23.28 KG/M2 | HEART RATE: 81 BPM | HEIGHT: 75 IN

## 2021-02-19 DIAGNOSIS — G89.3 CANCER RELATED PAIN: ICD-10-CM

## 2021-02-19 DIAGNOSIS — C18.9 METASTATIC COLON CANCER TO LIVER: ICD-10-CM

## 2021-02-19 DIAGNOSIS — Z86.010 HISTORY OF COLONIC POLYPS: ICD-10-CM

## 2021-02-19 DIAGNOSIS — G89.29 CHRONIC PAIN OF BOTH ANKLES: ICD-10-CM

## 2021-02-19 DIAGNOSIS — I10 ESSENTIAL HYPERTENSION: ICD-10-CM

## 2021-02-19 DIAGNOSIS — M25.572 CHRONIC PAIN OF BOTH ANKLES: ICD-10-CM

## 2021-02-19 DIAGNOSIS — G89.29 OTHER CHRONIC PAIN: Primary | ICD-10-CM

## 2021-02-19 DIAGNOSIS — N52.9 ERECTILE DYSFUNCTION, UNSPECIFIED ERECTILE DYSFUNCTION TYPE: ICD-10-CM

## 2021-02-19 DIAGNOSIS — F11.20 OPIATE DEPENDENCE, CONTINUOUS: ICD-10-CM

## 2021-02-19 DIAGNOSIS — M25.571 CHRONIC PAIN OF BOTH ANKLES: ICD-10-CM

## 2021-02-19 DIAGNOSIS — C78.7 METASTATIC COLON CANCER TO LIVER: ICD-10-CM

## 2021-02-19 PROCEDURE — 99214 PR OFFICE/OUTPT VISIT, EST, LEVL IV, 30-39 MIN: ICD-10-PCS | Mod: S$GLB,,, | Performed by: INTERNAL MEDICINE

## 2021-02-19 PROCEDURE — 99214 OFFICE O/P EST MOD 30 MIN: CPT | Mod: S$GLB,,, | Performed by: INTERNAL MEDICINE

## 2021-02-19 PROCEDURE — 3288F PR FALLS RISK ASSESSMENT DOCUMENTED: ICD-10-PCS | Mod: CPTII,S$GLB,, | Performed by: INTERNAL MEDICINE

## 2021-02-19 PROCEDURE — 3075F SYST BP GE 130 - 139MM HG: CPT | Mod: CPTII,S$GLB,, | Performed by: INTERNAL MEDICINE

## 2021-02-19 PROCEDURE — 3079F PR MOST RECENT DIASTOLIC BLOOD PRESSURE 80-89 MM HG: ICD-10-PCS | Mod: CPTII,S$GLB,, | Performed by: INTERNAL MEDICINE

## 2021-02-19 PROCEDURE — 3008F BODY MASS INDEX DOCD: CPT | Mod: CPTII,S$GLB,, | Performed by: INTERNAL MEDICINE

## 2021-02-19 PROCEDURE — 3079F DIAST BP 80-89 MM HG: CPT | Mod: CPTII,S$GLB,, | Performed by: INTERNAL MEDICINE

## 2021-02-19 PROCEDURE — 3288F FALL RISK ASSESSMENT DOCD: CPT | Mod: CPTII,S$GLB,, | Performed by: INTERNAL MEDICINE

## 2021-02-19 PROCEDURE — 1101F PT FALLS ASSESS-DOCD LE1/YR: CPT | Mod: CPTII,S$GLB,, | Performed by: INTERNAL MEDICINE

## 2021-02-19 PROCEDURE — 99999 PR PBB SHADOW E&M-EST. PATIENT-LVL III: CPT | Mod: PBBFAC,,, | Performed by: INTERNAL MEDICINE

## 2021-02-19 PROCEDURE — 1101F PR PT FALLS ASSESS DOC 0-1 FALLS W/OUT INJ PAST YR: ICD-10-PCS | Mod: CPTII,S$GLB,, | Performed by: INTERNAL MEDICINE

## 2021-02-19 PROCEDURE — 3008F PR BODY MASS INDEX (BMI) DOCUMENTED: ICD-10-PCS | Mod: CPTII,S$GLB,, | Performed by: INTERNAL MEDICINE

## 2021-02-19 PROCEDURE — 3075F PR MOST RECENT SYSTOLIC BLOOD PRESS GE 130-139MM HG: ICD-10-PCS | Mod: CPTII,S$GLB,, | Performed by: INTERNAL MEDICINE

## 2021-02-19 PROCEDURE — 99999 PR PBB SHADOW E&M-EST. PATIENT-LVL III: ICD-10-PCS | Mod: PBBFAC,,, | Performed by: INTERNAL MEDICINE

## 2021-02-19 RX ORDER — HYDROCODONE BITARTRATE AND ACETAMINOPHEN 5; 325 MG/1; MG/1
1 TABLET ORAL EVERY 6 HOURS PRN
Qty: 120 TABLET | Refills: 0 | Status: SHIPPED | OUTPATIENT
Start: 2021-03-19 | End: 2021-05-20 | Stop reason: SDUPTHER

## 2021-02-19 RX ORDER — HYDROCODONE BITARTRATE AND ACETAMINOPHEN 5; 325 MG/1; MG/1
1 TABLET ORAL EVERY 6 HOURS PRN
Qty: 120 TABLET | Refills: 0 | Status: SHIPPED | OUTPATIENT
Start: 2021-02-19 | End: 2021-08-20 | Stop reason: SDUPTHER

## 2021-02-19 RX ORDER — HYDROCODONE BITARTRATE AND ACETAMINOPHEN 5; 325 MG/1; MG/1
1 TABLET ORAL EVERY 6 HOURS PRN
Qty: 120 TABLET | Refills: 0 | Status: SHIPPED | OUTPATIENT
Start: 2021-04-19 | End: 2021-05-20 | Stop reason: SDUPTHER

## 2021-05-10 ENCOUNTER — PATIENT OUTREACH (OUTPATIENT)
Dept: ADMINISTRATIVE | Facility: HOSPITAL | Age: 66
End: 2021-05-10

## 2021-05-20 ENCOUNTER — OFFICE VISIT (OUTPATIENT)
Dept: FAMILY MEDICINE | Facility: CLINIC | Age: 66
End: 2021-05-20
Payer: COMMERCIAL

## 2021-05-20 VITALS
BODY MASS INDEX: 23.11 KG/M2 | OXYGEN SATURATION: 97 % | SYSTOLIC BLOOD PRESSURE: 130 MMHG | TEMPERATURE: 98 F | DIASTOLIC BLOOD PRESSURE: 80 MMHG | HEART RATE: 95 BPM | HEIGHT: 75 IN | WEIGHT: 185.88 LBS

## 2021-05-20 DIAGNOSIS — G89.29 OTHER CHRONIC PAIN: Primary | ICD-10-CM

## 2021-05-20 DIAGNOSIS — N52.9 ERECTILE DYSFUNCTION, UNSPECIFIED ERECTILE DYSFUNCTION TYPE: ICD-10-CM

## 2021-05-20 DIAGNOSIS — M25.572 CHRONIC PAIN OF BOTH ANKLES: ICD-10-CM

## 2021-05-20 DIAGNOSIS — I10 ESSENTIAL HYPERTENSION: ICD-10-CM

## 2021-05-20 DIAGNOSIS — F11.20 OPIATE DEPENDENCE, CONTINUOUS: ICD-10-CM

## 2021-05-20 DIAGNOSIS — G89.3 CANCER RELATED PAIN: ICD-10-CM

## 2021-05-20 DIAGNOSIS — M25.571 CHRONIC PAIN OF BOTH ANKLES: ICD-10-CM

## 2021-05-20 DIAGNOSIS — C18.9 METASTATIC COLON CANCER TO LIVER: ICD-10-CM

## 2021-05-20 DIAGNOSIS — C78.7 METASTATIC COLON CANCER TO LIVER: ICD-10-CM

## 2021-05-20 DIAGNOSIS — G89.29 CHRONIC PAIN OF BOTH ANKLES: ICD-10-CM

## 2021-05-20 DIAGNOSIS — Z86.010 HISTORY OF COLONIC POLYPS: ICD-10-CM

## 2021-05-20 PROCEDURE — 99214 OFFICE O/P EST MOD 30 MIN: CPT | Mod: S$GLB,,, | Performed by: INTERNAL MEDICINE

## 2021-05-20 PROCEDURE — 1101F PT FALLS ASSESS-DOCD LE1/YR: CPT | Mod: CPTII,S$GLB,, | Performed by: INTERNAL MEDICINE

## 2021-05-20 PROCEDURE — 99999 PR PBB SHADOW E&M-EST. PATIENT-LVL III: ICD-10-PCS | Mod: PBBFAC,,, | Performed by: INTERNAL MEDICINE

## 2021-05-20 PROCEDURE — 99999 PR PBB SHADOW E&M-EST. PATIENT-LVL III: CPT | Mod: PBBFAC,,, | Performed by: INTERNAL MEDICINE

## 2021-05-20 PROCEDURE — 1159F PR MEDICATION LIST DOCUMENTED IN MEDICAL RECORD: ICD-10-PCS | Mod: S$GLB,,, | Performed by: INTERNAL MEDICINE

## 2021-05-20 PROCEDURE — 3288F PR FALLS RISK ASSESSMENT DOCUMENTED: ICD-10-PCS | Mod: CPTII,S$GLB,, | Performed by: INTERNAL MEDICINE

## 2021-05-20 PROCEDURE — 1101F PR PT FALLS ASSESS DOC 0-1 FALLS W/OUT INJ PAST YR: ICD-10-PCS | Mod: CPTII,S$GLB,, | Performed by: INTERNAL MEDICINE

## 2021-05-20 PROCEDURE — 99214 PR OFFICE/OUTPT VISIT, EST, LEVL IV, 30-39 MIN: ICD-10-PCS | Mod: S$GLB,,, | Performed by: INTERNAL MEDICINE

## 2021-05-20 PROCEDURE — 3008F PR BODY MASS INDEX (BMI) DOCUMENTED: ICD-10-PCS | Mod: CPTII,S$GLB,, | Performed by: INTERNAL MEDICINE

## 2021-05-20 PROCEDURE — 3288F FALL RISK ASSESSMENT DOCD: CPT | Mod: CPTII,S$GLB,, | Performed by: INTERNAL MEDICINE

## 2021-05-20 PROCEDURE — 1159F MED LIST DOCD IN RCRD: CPT | Mod: S$GLB,,, | Performed by: INTERNAL MEDICINE

## 2021-05-20 PROCEDURE — 3008F BODY MASS INDEX DOCD: CPT | Mod: CPTII,S$GLB,, | Performed by: INTERNAL MEDICINE

## 2021-05-20 RX ORDER — HYDROCODONE BITARTRATE AND ACETAMINOPHEN 5; 325 MG/1; MG/1
1 TABLET ORAL EVERY 6 HOURS PRN
Qty: 120 TABLET | Refills: 0 | Status: SHIPPED | OUTPATIENT
Start: 2021-07-20 | End: 2021-08-20 | Stop reason: SDUPTHER

## 2021-05-20 RX ORDER — HYDROCODONE BITARTRATE AND ACETAMINOPHEN 5; 325 MG/1; MG/1
1 TABLET ORAL EVERY 6 HOURS PRN
Qty: 120 TABLET | Refills: 0 | Status: SHIPPED | OUTPATIENT
Start: 2021-06-20 | End: 2021-08-20 | Stop reason: SDUPTHER

## 2021-05-20 RX ORDER — HYDROCODONE BITARTRATE AND ACETAMINOPHEN 5; 325 MG/1; MG/1
1 TABLET ORAL EVERY 6 HOURS PRN
Qty: 120 TABLET | Refills: 0 | Status: SHIPPED | OUTPATIENT
Start: 2021-05-20 | End: 2021-10-19 | Stop reason: SDUPTHER

## 2021-08-09 ENCOUNTER — PATIENT OUTREACH (OUTPATIENT)
Dept: ADMINISTRATIVE | Facility: HOSPITAL | Age: 66
End: 2021-08-09

## 2021-08-20 ENCOUNTER — OFFICE VISIT (OUTPATIENT)
Dept: FAMILY MEDICINE | Facility: CLINIC | Age: 66
End: 2021-08-20
Payer: COMMERCIAL

## 2021-08-20 VITALS
HEIGHT: 75 IN | SYSTOLIC BLOOD PRESSURE: 120 MMHG | HEART RATE: 73 BPM | BODY MASS INDEX: 23.49 KG/M2 | WEIGHT: 188.94 LBS | DIASTOLIC BLOOD PRESSURE: 80 MMHG | OXYGEN SATURATION: 98 % | TEMPERATURE: 99 F

## 2021-08-20 DIAGNOSIS — E53.8 B12 DEFICIENCY: ICD-10-CM

## 2021-08-20 DIAGNOSIS — G89.29 CHRONIC PAIN OF BOTH ANKLES: ICD-10-CM

## 2021-08-20 DIAGNOSIS — M25.571 CHRONIC PAIN OF BOTH ANKLES: ICD-10-CM

## 2021-08-20 DIAGNOSIS — N52.9 ERECTILE DYSFUNCTION, UNSPECIFIED ERECTILE DYSFUNCTION TYPE: ICD-10-CM

## 2021-08-20 DIAGNOSIS — C18.9 METASTATIC COLON CANCER TO LIVER: ICD-10-CM

## 2021-08-20 DIAGNOSIS — I10 ESSENTIAL HYPERTENSION: ICD-10-CM

## 2021-08-20 DIAGNOSIS — C78.7 METASTATIC COLON CANCER TO LIVER: ICD-10-CM

## 2021-08-20 DIAGNOSIS — Z86.010 HISTORY OF COLONIC POLYPS: ICD-10-CM

## 2021-08-20 DIAGNOSIS — Z00.00 ROUTINE MEDICAL EXAM: Primary | ICD-10-CM

## 2021-08-20 DIAGNOSIS — Z12.5 SCREENING FOR PROSTATE CANCER: ICD-10-CM

## 2021-08-20 DIAGNOSIS — G89.29 OTHER CHRONIC PAIN: ICD-10-CM

## 2021-08-20 DIAGNOSIS — M25.572 CHRONIC PAIN OF BOTH ANKLES: ICD-10-CM

## 2021-08-20 DIAGNOSIS — G89.3 CANCER RELATED PAIN: ICD-10-CM

## 2021-08-20 DIAGNOSIS — F11.20 OPIATE DEPENDENCE, CONTINUOUS: ICD-10-CM

## 2021-08-20 PROCEDURE — 1159F PR MEDICATION LIST DOCUMENTED IN MEDICAL RECORD: ICD-10-PCS | Mod: CPTII,S$GLB,, | Performed by: INTERNAL MEDICINE

## 2021-08-20 PROCEDURE — 99397 PR PREVENTIVE VISIT,EST,65 & OVER: ICD-10-PCS | Mod: S$GLB,,, | Performed by: INTERNAL MEDICINE

## 2021-08-20 PROCEDURE — 3074F PR MOST RECENT SYSTOLIC BLOOD PRESSURE < 130 MM HG: ICD-10-PCS | Mod: CPTII,S$GLB,, | Performed by: INTERNAL MEDICINE

## 2021-08-20 PROCEDURE — 3079F PR MOST RECENT DIASTOLIC BLOOD PRESSURE 80-89 MM HG: ICD-10-PCS | Mod: CPTII,S$GLB,, | Performed by: INTERNAL MEDICINE

## 2021-08-20 PROCEDURE — 99999 PR PBB SHADOW E&M-EST. PATIENT-LVL III: CPT | Mod: PBBFAC,,, | Performed by: INTERNAL MEDICINE

## 2021-08-20 PROCEDURE — 99397 PER PM REEVAL EST PAT 65+ YR: CPT | Mod: S$GLB,,, | Performed by: INTERNAL MEDICINE

## 2021-08-20 PROCEDURE — 3074F SYST BP LT 130 MM HG: CPT | Mod: CPTII,S$GLB,, | Performed by: INTERNAL MEDICINE

## 2021-08-20 PROCEDURE — 3008F BODY MASS INDEX DOCD: CPT | Mod: CPTII,S$GLB,, | Performed by: INTERNAL MEDICINE

## 2021-08-20 PROCEDURE — 99999 PR PBB SHADOW E&M-EST. PATIENT-LVL III: ICD-10-PCS | Mod: PBBFAC,,, | Performed by: INTERNAL MEDICINE

## 2021-08-20 PROCEDURE — 1159F MED LIST DOCD IN RCRD: CPT | Mod: CPTII,S$GLB,, | Performed by: INTERNAL MEDICINE

## 2021-08-20 PROCEDURE — 3288F FALL RISK ASSESSMENT DOCD: CPT | Mod: CPTII,S$GLB,, | Performed by: INTERNAL MEDICINE

## 2021-08-20 PROCEDURE — 3008F PR BODY MASS INDEX (BMI) DOCUMENTED: ICD-10-PCS | Mod: CPTII,S$GLB,, | Performed by: INTERNAL MEDICINE

## 2021-08-20 PROCEDURE — 1101F PT FALLS ASSESS-DOCD LE1/YR: CPT | Mod: CPTII,S$GLB,, | Performed by: INTERNAL MEDICINE

## 2021-08-20 PROCEDURE — 3288F PR FALLS RISK ASSESSMENT DOCUMENTED: ICD-10-PCS | Mod: CPTII,S$GLB,, | Performed by: INTERNAL MEDICINE

## 2021-08-20 PROCEDURE — 3079F DIAST BP 80-89 MM HG: CPT | Mod: CPTII,S$GLB,, | Performed by: INTERNAL MEDICINE

## 2021-08-20 PROCEDURE — 1101F PR PT FALLS ASSESS DOC 0-1 FALLS W/OUT INJ PAST YR: ICD-10-PCS | Mod: CPTII,S$GLB,, | Performed by: INTERNAL MEDICINE

## 2021-08-20 RX ORDER — HYDROCODONE BITARTRATE AND ACETAMINOPHEN 5; 325 MG/1; MG/1
1 TABLET ORAL EVERY 6 HOURS PRN
Qty: 120 TABLET | Refills: 0 | Status: SHIPPED | OUTPATIENT
Start: 2021-08-20 | End: 2021-12-09 | Stop reason: SDUPTHER

## 2021-08-20 RX ORDER — HYDROCODONE BITARTRATE AND ACETAMINOPHEN 5; 325 MG/1; MG/1
1 TABLET ORAL EVERY 6 HOURS PRN
Qty: 120 TABLET | Refills: 0 | Status: SHIPPED | OUTPATIENT
Start: 2021-10-20 | End: 2021-10-18 | Stop reason: SDUPTHER

## 2021-08-20 RX ORDER — HYDROCODONE BITARTRATE AND ACETAMINOPHEN 5; 325 MG/1; MG/1
1 TABLET ORAL EVERY 6 HOURS PRN
Qty: 120 TABLET | Refills: 0 | Status: SHIPPED | OUTPATIENT
Start: 2021-09-20 | End: 2021-11-19 | Stop reason: SDUPTHER

## 2021-08-26 ENCOUNTER — LAB VISIT (OUTPATIENT)
Dept: LAB | Facility: HOSPITAL | Age: 66
End: 2021-08-26
Attending: INTERNAL MEDICINE
Payer: COMMERCIAL

## 2021-08-26 DIAGNOSIS — I10 ESSENTIAL HYPERTENSION: ICD-10-CM

## 2021-08-26 DIAGNOSIS — E53.8 B12 DEFICIENCY: ICD-10-CM

## 2021-08-26 DIAGNOSIS — Z00.00 ROUTINE MEDICAL EXAM: ICD-10-CM

## 2021-08-26 DIAGNOSIS — Z12.5 SCREENING FOR PROSTATE CANCER: ICD-10-CM

## 2021-08-26 LAB
ALBUMIN SERPL BCP-MCNC: 3.5 G/DL (ref 3.5–5.2)
ALP SERPL-CCNC: 133 U/L (ref 55–135)
ALT SERPL W/O P-5'-P-CCNC: 12 U/L (ref 10–44)
ANION GAP SERPL CALC-SCNC: 10 MMOL/L (ref 8–16)
AST SERPL-CCNC: 18 U/L (ref 10–40)
BASOPHILS # BLD AUTO: 0.05 K/UL (ref 0–0.2)
BASOPHILS NFR BLD: 0.6 % (ref 0–1.9)
BILIRUB SERPL-MCNC: 0.4 MG/DL (ref 0.1–1)
BUN SERPL-MCNC: 10 MG/DL (ref 8–23)
CALCIUM SERPL-MCNC: 9.2 MG/DL (ref 8.7–10.5)
CHLORIDE SERPL-SCNC: 105 MMOL/L (ref 95–110)
CHOLEST SERPL-MCNC: 152 MG/DL (ref 120–199)
CHOLEST/HDLC SERPL: 3.3 {RATIO} (ref 2–5)
CO2 SERPL-SCNC: 21 MMOL/L (ref 23–29)
COMPLEXED PSA SERPL-MCNC: 1.4 NG/ML (ref 0–4)
CREAT SERPL-MCNC: 0.9 MG/DL (ref 0.5–1.4)
DIFFERENTIAL METHOD: ABNORMAL
EOSINOPHIL # BLD AUTO: 0.1 K/UL (ref 0–0.5)
EOSINOPHIL NFR BLD: 1.4 % (ref 0–8)
ERYTHROCYTE [DISTWIDTH] IN BLOOD BY AUTOMATED COUNT: 13.3 % (ref 11.5–14.5)
EST. GFR  (AFRICAN AMERICAN): >60 ML/MIN/1.73 M^2
EST. GFR  (NON AFRICAN AMERICAN): >60 ML/MIN/1.73 M^2
GLUCOSE SERPL-MCNC: 118 MG/DL (ref 70–110)
HCT VFR BLD AUTO: 46.7 % (ref 40–54)
HDLC SERPL-MCNC: 46 MG/DL (ref 40–75)
HDLC SERPL: 30.3 % (ref 20–50)
HGB BLD-MCNC: 15.6 G/DL (ref 14–18)
IMM GRANULOCYTES # BLD AUTO: 0.05 K/UL (ref 0–0.04)
IMM GRANULOCYTES NFR BLD AUTO: 0.6 % (ref 0–0.5)
LDLC SERPL CALC-MCNC: 91.6 MG/DL (ref 63–159)
LYMPHOCYTES # BLD AUTO: 1.2 K/UL (ref 1–4.8)
LYMPHOCYTES NFR BLD: 13.8 % (ref 18–48)
MCH RBC QN AUTO: 32 PG (ref 27–31)
MCHC RBC AUTO-ENTMCNC: 33.4 G/DL (ref 32–36)
MCV RBC AUTO: 96 FL (ref 82–98)
MONOCYTES # BLD AUTO: 0.9 K/UL (ref 0.3–1)
MONOCYTES NFR BLD: 10.1 % (ref 4–15)
NEUTROPHILS # BLD AUTO: 6.2 K/UL (ref 1.8–7.7)
NEUTROPHILS NFR BLD: 73.5 % (ref 38–73)
NONHDLC SERPL-MCNC: 106 MG/DL
NRBC BLD-RTO: 0 /100 WBC
PLATELET # BLD AUTO: 289 K/UL (ref 150–450)
PMV BLD AUTO: 8.8 FL (ref 9.2–12.9)
POTASSIUM SERPL-SCNC: 4.1 MMOL/L (ref 3.5–5.1)
PROT SERPL-MCNC: 7.6 G/DL (ref 6–8.4)
RBC # BLD AUTO: 4.87 M/UL (ref 4.6–6.2)
SODIUM SERPL-SCNC: 136 MMOL/L (ref 136–145)
TRIGL SERPL-MCNC: 72 MG/DL (ref 30–150)
TSH SERPL DL<=0.005 MIU/L-ACNC: 1.3 UIU/ML (ref 0.4–4)
VIT B12 SERPL-MCNC: 1132 PG/ML (ref 210–950)
WBC # BLD AUTO: 8.38 K/UL (ref 3.9–12.7)

## 2021-08-26 PROCEDURE — 84153 ASSAY OF PSA TOTAL: CPT | Performed by: INTERNAL MEDICINE

## 2021-08-26 PROCEDURE — 36415 COLL VENOUS BLD VENIPUNCTURE: CPT | Performed by: INTERNAL MEDICINE

## 2021-08-26 PROCEDURE — 84443 ASSAY THYROID STIM HORMONE: CPT | Performed by: INTERNAL MEDICINE

## 2021-08-26 PROCEDURE — 82607 VITAMIN B-12: CPT | Performed by: INTERNAL MEDICINE

## 2021-08-26 PROCEDURE — 85025 COMPLETE CBC W/AUTO DIFF WBC: CPT | Performed by: INTERNAL MEDICINE

## 2021-08-26 PROCEDURE — 80061 LIPID PANEL: CPT | Performed by: INTERNAL MEDICINE

## 2021-08-26 PROCEDURE — 80053 COMPREHEN METABOLIC PANEL: CPT | Performed by: INTERNAL MEDICINE

## 2021-09-14 ENCOUNTER — OFFICE VISIT (OUTPATIENT)
Dept: FAMILY MEDICINE | Facility: CLINIC | Age: 66
End: 2021-09-14
Payer: COMMERCIAL

## 2021-09-14 VITALS
WEIGHT: 183 LBS | BODY MASS INDEX: 22.75 KG/M2 | HEIGHT: 75 IN | TEMPERATURE: 98 F | HEART RATE: 82 BPM | OXYGEN SATURATION: 99 % | DIASTOLIC BLOOD PRESSURE: 72 MMHG | SYSTOLIC BLOOD PRESSURE: 126 MMHG

## 2021-09-14 DIAGNOSIS — C18.9 METASTATIC COLON CANCER TO LIVER: ICD-10-CM

## 2021-09-14 DIAGNOSIS — E53.8 B12 DEFICIENCY: ICD-10-CM

## 2021-09-14 DIAGNOSIS — G89.3 CANCER RELATED PAIN: ICD-10-CM

## 2021-09-14 DIAGNOSIS — M25.571 CHRONIC PAIN OF BOTH ANKLES: ICD-10-CM

## 2021-09-14 DIAGNOSIS — M25.572 CHRONIC PAIN OF BOTH ANKLES: ICD-10-CM

## 2021-09-14 DIAGNOSIS — I10 ESSENTIAL HYPERTENSION: ICD-10-CM

## 2021-09-14 DIAGNOSIS — C78.7 METASTATIC COLON CANCER TO LIVER: ICD-10-CM

## 2021-09-14 DIAGNOSIS — G89.29 OTHER CHRONIC PAIN: Primary | ICD-10-CM

## 2021-09-14 DIAGNOSIS — G89.29 CHRONIC PAIN OF BOTH ANKLES: ICD-10-CM

## 2021-09-14 PROCEDURE — 3074F PR MOST RECENT SYSTOLIC BLOOD PRESSURE < 130 MM HG: ICD-10-PCS | Mod: CPTII,S$GLB,, | Performed by: INTERNAL MEDICINE

## 2021-09-14 PROCEDURE — 3008F BODY MASS INDEX DOCD: CPT | Mod: CPTII,S$GLB,, | Performed by: INTERNAL MEDICINE

## 2021-09-14 PROCEDURE — 1159F MED LIST DOCD IN RCRD: CPT | Mod: CPTII,S$GLB,, | Performed by: INTERNAL MEDICINE

## 2021-09-14 PROCEDURE — 99999 PR PBB SHADOW E&M-EST. PATIENT-LVL III: CPT | Mod: PBBFAC,,, | Performed by: INTERNAL MEDICINE

## 2021-09-14 PROCEDURE — 99999 PR PBB SHADOW E&M-EST. PATIENT-LVL III: ICD-10-PCS | Mod: PBBFAC,,, | Performed by: INTERNAL MEDICINE

## 2021-09-14 PROCEDURE — 3008F PR BODY MASS INDEX (BMI) DOCUMENTED: ICD-10-PCS | Mod: CPTII,S$GLB,, | Performed by: INTERNAL MEDICINE

## 2021-09-14 PROCEDURE — 3078F DIAST BP <80 MM HG: CPT | Mod: CPTII,S$GLB,, | Performed by: INTERNAL MEDICINE

## 2021-09-14 PROCEDURE — 3288F PR FALLS RISK ASSESSMENT DOCUMENTED: ICD-10-PCS | Mod: CPTII,S$GLB,, | Performed by: INTERNAL MEDICINE

## 2021-09-14 PROCEDURE — 1159F PR MEDICATION LIST DOCUMENTED IN MEDICAL RECORD: ICD-10-PCS | Mod: CPTII,S$GLB,, | Performed by: INTERNAL MEDICINE

## 2021-09-14 PROCEDURE — 99214 OFFICE O/P EST MOD 30 MIN: CPT | Mod: S$GLB,,, | Performed by: INTERNAL MEDICINE

## 2021-09-14 PROCEDURE — 99214 PR OFFICE/OUTPT VISIT, EST, LEVL IV, 30-39 MIN: ICD-10-PCS | Mod: S$GLB,,, | Performed by: INTERNAL MEDICINE

## 2021-09-14 PROCEDURE — 3288F FALL RISK ASSESSMENT DOCD: CPT | Mod: CPTII,S$GLB,, | Performed by: INTERNAL MEDICINE

## 2021-09-14 PROCEDURE — 3078F PR MOST RECENT DIASTOLIC BLOOD PRESSURE < 80 MM HG: ICD-10-PCS | Mod: CPTII,S$GLB,, | Performed by: INTERNAL MEDICINE

## 2021-09-14 PROCEDURE — 1101F PT FALLS ASSESS-DOCD LE1/YR: CPT | Mod: CPTII,S$GLB,, | Performed by: INTERNAL MEDICINE

## 2021-09-14 PROCEDURE — 3074F SYST BP LT 130 MM HG: CPT | Mod: CPTII,S$GLB,, | Performed by: INTERNAL MEDICINE

## 2021-09-14 PROCEDURE — 1101F PR PT FALLS ASSESS DOC 0-1 FALLS W/OUT INJ PAST YR: ICD-10-PCS | Mod: CPTII,S$GLB,, | Performed by: INTERNAL MEDICINE

## 2021-10-18 RX ORDER — HYDROCODONE BITARTRATE AND ACETAMINOPHEN 5; 325 MG/1; MG/1
1 TABLET ORAL EVERY 6 HOURS PRN
Qty: 120 TABLET | Refills: 0 | Status: SHIPPED | OUTPATIENT
Start: 2021-10-18 | End: 2021-12-09 | Stop reason: SDUPTHER

## 2021-10-19 ENCOUNTER — TELEPHONE (OUTPATIENT)
Dept: FAMILY MEDICINE | Facility: CLINIC | Age: 66
End: 2021-10-19

## 2021-10-19 RX ORDER — HYDROCODONE BITARTRATE AND ACETAMINOPHEN 5; 325 MG/1; MG/1
1 TABLET ORAL EVERY 6 HOURS PRN
Qty: 120 TABLET | Refills: 0 | Status: SHIPPED | OUTPATIENT
Start: 2021-10-19 | End: 2021-12-09 | Stop reason: SDUPTHER

## 2021-11-18 RX ORDER — HYDROCODONE BITARTRATE AND ACETAMINOPHEN 5; 325 MG/1; MG/1
1 TABLET ORAL EVERY 6 HOURS PRN
Qty: 120 TABLET | Refills: 0 | Status: CANCELLED | OUTPATIENT
Start: 2021-11-18

## 2021-11-21 RX ORDER — HYDROCODONE BITARTRATE AND ACETAMINOPHEN 5; 325 MG/1; MG/1
1 TABLET ORAL EVERY 6 HOURS PRN
Qty: 120 TABLET | Refills: 0 | Status: SHIPPED | OUTPATIENT
Start: 2021-11-21 | End: 2022-04-12 | Stop reason: SDUPTHER

## 2021-12-09 ENCOUNTER — OFFICE VISIT (OUTPATIENT)
Dept: FAMILY MEDICINE | Facility: CLINIC | Age: 66
End: 2021-12-09
Payer: COMMERCIAL

## 2021-12-09 VITALS
OXYGEN SATURATION: 96 % | HEIGHT: 75 IN | DIASTOLIC BLOOD PRESSURE: 76 MMHG | HEART RATE: 110 BPM | TEMPERATURE: 98 F | WEIGHT: 182.75 LBS | SYSTOLIC BLOOD PRESSURE: 136 MMHG | BODY MASS INDEX: 22.72 KG/M2

## 2021-12-09 DIAGNOSIS — F11.20 OPIATE DEPENDENCE, CONTINUOUS: ICD-10-CM

## 2021-12-09 DIAGNOSIS — Z23 NEED FOR VACCINATION: ICD-10-CM

## 2021-12-09 DIAGNOSIS — C78.7 METASTATIC COLON CANCER TO LIVER: ICD-10-CM

## 2021-12-09 DIAGNOSIS — E53.8 B12 DEFICIENCY: ICD-10-CM

## 2021-12-09 DIAGNOSIS — C18.9 METASTATIC COLON CANCER TO LIVER: ICD-10-CM

## 2021-12-09 DIAGNOSIS — Z23 FLU VACCINE NEED: ICD-10-CM

## 2021-12-09 DIAGNOSIS — M25.571 CHRONIC PAIN OF BOTH ANKLES: ICD-10-CM

## 2021-12-09 DIAGNOSIS — G89.29 OTHER CHRONIC PAIN: Primary | ICD-10-CM

## 2021-12-09 DIAGNOSIS — I10 ESSENTIAL HYPERTENSION: ICD-10-CM

## 2021-12-09 DIAGNOSIS — G89.29 CHRONIC PAIN OF BOTH ANKLES: ICD-10-CM

## 2021-12-09 DIAGNOSIS — M25.572 CHRONIC PAIN OF BOTH ANKLES: ICD-10-CM

## 2021-12-09 DIAGNOSIS — G89.3 CANCER RELATED PAIN: ICD-10-CM

## 2021-12-09 PROCEDURE — 90471 IMMUNIZATION ADMIN: CPT | Mod: S$GLB,,, | Performed by: INTERNAL MEDICINE

## 2021-12-09 PROCEDURE — 99999 PR PBB SHADOW E&M-EST. PATIENT-LVL III: ICD-10-PCS | Mod: PBBFAC,,, | Performed by: INTERNAL MEDICINE

## 2021-12-09 PROCEDURE — 90471 FLU VACCINE - QUADRIVALENT - ADJUVANTED: ICD-10-PCS | Mod: S$GLB,,, | Performed by: INTERNAL MEDICINE

## 2021-12-09 PROCEDURE — 99214 PR OFFICE/OUTPT VISIT, EST, LEVL IV, 30-39 MIN: ICD-10-PCS | Mod: 25,S$GLB,, | Performed by: INTERNAL MEDICINE

## 2021-12-09 PROCEDURE — 90694 VACC AIIV4 NO PRSRV 0.5ML IM: CPT | Mod: S$GLB,,, | Performed by: INTERNAL MEDICINE

## 2021-12-09 PROCEDURE — 99214 OFFICE O/P EST MOD 30 MIN: CPT | Mod: 25,S$GLB,, | Performed by: INTERNAL MEDICINE

## 2021-12-09 PROCEDURE — 90694 FLU VACCINE - QUADRIVALENT - ADJUVANTED: ICD-10-PCS | Mod: S$GLB,,, | Performed by: INTERNAL MEDICINE

## 2021-12-09 PROCEDURE — 99999 PR PBB SHADOW E&M-EST. PATIENT-LVL III: CPT | Mod: PBBFAC,,, | Performed by: INTERNAL MEDICINE

## 2021-12-09 RX ORDER — HYDROCODONE BITARTRATE AND ACETAMINOPHEN 5; 325 MG/1; MG/1
1 TABLET ORAL EVERY 6 HOURS PRN
Qty: 120 TABLET | Refills: 0 | Status: SHIPPED | OUTPATIENT
Start: 2021-12-09 | End: 2022-03-16 | Stop reason: SDUPTHER

## 2021-12-09 RX ORDER — HYDROCODONE BITARTRATE AND ACETAMINOPHEN 5; 325 MG/1; MG/1
1 TABLET ORAL EVERY 6 HOURS PRN
Qty: 120 TABLET | Refills: 0 | Status: SHIPPED | OUTPATIENT
Start: 2022-01-09 | End: 2022-04-12 | Stop reason: SDUPTHER

## 2021-12-09 RX ORDER — HYDROCODONE BITARTRATE AND ACETAMINOPHEN 5; 325 MG/1; MG/1
1 TABLET ORAL EVERY 6 HOURS PRN
Qty: 120 TABLET | Refills: 0 | Status: SHIPPED | OUTPATIENT
Start: 2022-02-09 | End: 2022-04-12 | Stop reason: SDUPTHER

## 2022-02-08 NOTE — TELEPHONE ENCOUNTER
No new care gaps identified.  Powered by [employer]. Reference number: [id number].   2/08/2022 10:39:27 AM CST

## 2022-02-09 RX ORDER — TADALAFIL 20 MG/1
TABLET ORAL
Qty: 12 TABLET | Refills: 12 | Status: SHIPPED | OUTPATIENT
Start: 2022-02-09

## 2022-03-16 RX ORDER — HYDROCODONE BITARTRATE AND ACETAMINOPHEN 5; 325 MG/1; MG/1
1 TABLET ORAL EVERY 6 HOURS PRN
Qty: 120 TABLET | Refills: 0 | Status: SHIPPED | OUTPATIENT
Start: 2022-05-16 | End: 2022-07-19 | Stop reason: SDUPTHER

## 2022-03-16 RX ORDER — HYDROCODONE BITARTRATE AND ACETAMINOPHEN 5; 325 MG/1; MG/1
1 TABLET ORAL EVERY 6 HOURS PRN
Qty: 120 TABLET | Refills: 0 | Status: SHIPPED | OUTPATIENT
Start: 2022-03-16 | End: 2022-07-19 | Stop reason: SDUPTHER

## 2022-03-16 RX ORDER — HYDROCODONE BITARTRATE AND ACETAMINOPHEN 5; 325 MG/1; MG/1
1 TABLET ORAL EVERY 6 HOURS PRN
Qty: 120 TABLET | Refills: 0 | Status: SHIPPED | OUTPATIENT
Start: 2022-04-16 | End: 2022-07-19 | Stop reason: SDUPTHER

## 2022-03-16 NOTE — TELEPHONE ENCOUNTER
No new care gaps identified.  Powered by [employer]. Reference number: [id number].   3/16/2022 10:09:40 AM CDT

## 2022-03-16 NOTE — TELEPHONE ENCOUNTER
----- Message from Alexander Gamboa sent at 3/16/2022 10:04 AM CDT -----  Regarding: refill/ patient would like them sent to the [facility] on 3/19  Type: RX Refill Request    Who Called: Tr     Refill or New Rx: refill     RX Name and Strength:HYDROcodone-acetaminophen (NORCO) 5-325 mg per tablet    Is this a 30 day or 90 day RX:30 day     Preferred Pharmacy with phone number: [id number], [place] - [address] AT [facility] [address]    Would the patient rather a call back or a response via [facility]? Call back     Best Call Back Number:[phone]    Additional Info: the patient stated that his refill is not due until 3/19. He has an upcoming appt with Dr. Bragg on 3/22.

## 2022-03-16 NOTE — TELEPHONE ENCOUNTER
----- Message from Alexander Gamboa sent at 3/16/2022 10:04 AM CDT -----  Regarding: refill/ patient would like them sent to the [facility] on 3/19  Type: RX Refill Request    Who Called: Tr     Refill or New Rx: refill     RX Name and Strength:HYDROcodone-acetaminophen (NORCO) 5-325 mg per tablet    Is this a 30 day or 90 day RX:30 day     Preferred Pharmacy with phone number: [facility] #[id number], [place] - [address] AT [facility] [address]    Would the patient rather a call back or a response via [facility]? Call back     Best Call Back Number:[phone]    Additional Info: the patient stated that his refill is not due until 3/19. He has an upcoming appt with Dr. Bragg on 3/22.

## 2022-04-12 ENCOUNTER — OFFICE VISIT (OUTPATIENT)
Dept: FAMILY MEDICINE | Facility: CLINIC | Age: 67
End: 2022-04-12
Payer: COMMERCIAL

## 2022-04-12 VITALS
WEIGHT: 180.75 LBS | SYSTOLIC BLOOD PRESSURE: 136 MMHG | BODY MASS INDEX: 22.47 KG/M2 | OXYGEN SATURATION: 97 % | HEART RATE: 75 BPM | DIASTOLIC BLOOD PRESSURE: 82 MMHG | TEMPERATURE: 98 F | HEIGHT: 75 IN

## 2022-04-12 DIAGNOSIS — C18.9 METASTATIC COLON CANCER TO LIVER: ICD-10-CM

## 2022-04-12 DIAGNOSIS — F17.200 TOBACCO USE DISORDER: ICD-10-CM

## 2022-04-12 DIAGNOSIS — C78.7 METASTATIC COLON CANCER TO LIVER: ICD-10-CM

## 2022-04-12 DIAGNOSIS — F11.20 OPIATE DEPENDENCE, CONTINUOUS: ICD-10-CM

## 2022-04-12 DIAGNOSIS — E53.8 B12 DEFICIENCY: ICD-10-CM

## 2022-04-12 DIAGNOSIS — M25.571 CHRONIC PAIN OF BOTH ANKLES: ICD-10-CM

## 2022-04-12 DIAGNOSIS — G89.29 CHRONIC PAIN OF BOTH ANKLES: ICD-10-CM

## 2022-04-12 DIAGNOSIS — G89.29 OTHER CHRONIC PAIN: Primary | ICD-10-CM

## 2022-04-12 DIAGNOSIS — M25.572 CHRONIC PAIN OF BOTH ANKLES: ICD-10-CM

## 2022-04-12 DIAGNOSIS — N52.9 ERECTILE DYSFUNCTION, UNSPECIFIED ERECTILE DYSFUNCTION TYPE: ICD-10-CM

## 2022-04-12 DIAGNOSIS — Z86.010 HISTORY OF COLONIC POLYPS: ICD-10-CM

## 2022-04-12 DIAGNOSIS — I10 ESSENTIAL HYPERTENSION: ICD-10-CM

## 2022-04-12 DIAGNOSIS — G89.3 CANCER RELATED PAIN: ICD-10-CM

## 2022-04-12 PROCEDURE — 3075F SYST BP GE 130 - 139MM HG: CPT | Mod: CPTII,S$GLB,, | Performed by: INTERNAL MEDICINE

## 2022-04-12 PROCEDURE — 3075F PR MOST RECENT SYSTOLIC BLOOD PRESS GE 130-139MM HG: ICD-10-PCS | Mod: CPTII,S$GLB,, | Performed by: INTERNAL MEDICINE

## 2022-04-12 PROCEDURE — 3079F PR MOST RECENT DIASTOLIC BLOOD PRESSURE 80-89 MM HG: ICD-10-PCS | Mod: CPTII,S$GLB,, | Performed by: INTERNAL MEDICINE

## 2022-04-12 PROCEDURE — 99214 PR OFFICE/OUTPT VISIT, EST, LEVL IV, 30-39 MIN: ICD-10-PCS | Mod: S$GLB,,, | Performed by: INTERNAL MEDICINE

## 2022-04-12 PROCEDURE — 3288F PR FALLS RISK ASSESSMENT DOCUMENTED: ICD-10-PCS | Mod: CPTII,S$GLB,, | Performed by: INTERNAL MEDICINE

## 2022-04-12 PROCEDURE — 3008F BODY MASS INDEX DOCD: CPT | Mod: CPTII,S$GLB,, | Performed by: INTERNAL MEDICINE

## 2022-04-12 PROCEDURE — 1101F PR PT FALLS ASSESS DOC 0-1 FALLS W/OUT INJ PAST YR: ICD-10-PCS | Mod: CPTII,S$GLB,, | Performed by: INTERNAL MEDICINE

## 2022-04-12 PROCEDURE — 3288F FALL RISK ASSESSMENT DOCD: CPT | Mod: CPTII,S$GLB,, | Performed by: INTERNAL MEDICINE

## 2022-04-12 PROCEDURE — 99214 OFFICE O/P EST MOD 30 MIN: CPT | Mod: S$GLB,,, | Performed by: INTERNAL MEDICINE

## 2022-04-12 PROCEDURE — 3008F PR BODY MASS INDEX (BMI) DOCUMENTED: ICD-10-PCS | Mod: CPTII,S$GLB,, | Performed by: INTERNAL MEDICINE

## 2022-04-12 PROCEDURE — 3079F DIAST BP 80-89 MM HG: CPT | Mod: CPTII,S$GLB,, | Performed by: INTERNAL MEDICINE

## 2022-04-12 PROCEDURE — 99999 PR PBB SHADOW E&M-EST. PATIENT-LVL III: ICD-10-PCS | Mod: PBBFAC,,, | Performed by: INTERNAL MEDICINE

## 2022-04-12 PROCEDURE — 1101F PT FALLS ASSESS-DOCD LE1/YR: CPT | Mod: CPTII,S$GLB,, | Performed by: INTERNAL MEDICINE

## 2022-04-12 PROCEDURE — 99999 PR PBB SHADOW E&M-EST. PATIENT-LVL III: CPT | Mod: PBBFAC,,, | Performed by: INTERNAL MEDICINE

## 2022-04-12 RX ORDER — HYDROCODONE BITARTRATE AND ACETAMINOPHEN 5; 325 MG/1; MG/1
1 TABLET ORAL EVERY 6 HOURS PRN
Qty: 120 TABLET | Refills: 0 | Status: SHIPPED | OUTPATIENT
Start: 2022-04-12 | End: 2022-10-19 | Stop reason: SDUPTHER

## 2022-04-12 RX ORDER — HYDROCODONE BITARTRATE AND ACETAMINOPHEN 5; 325 MG/1; MG/1
1 TABLET ORAL EVERY 6 HOURS PRN
Qty: 120 TABLET | Refills: 0 | Status: SHIPPED | OUTPATIENT
Start: 2022-05-12 | End: 2022-10-19 | Stop reason: SDUPTHER

## 2022-04-12 RX ORDER — HYDROCODONE BITARTRATE AND ACETAMINOPHEN 5; 325 MG/1; MG/1
1 TABLET ORAL EVERY 6 HOURS PRN
Qty: 120 TABLET | Refills: 0 | Status: SHIPPED | OUTPATIENT
Start: 2022-06-12 | End: 2022-10-19 | Stop reason: SDUPTHER

## 2022-04-12 NOTE — PROGRESS NOTES
Chief complaint follow-up on pain medications and discussed follow-up on cancer    67-year-old white male .  He is on his yearly FMLA form related to his colon cancer treatment and arthritis of both ankles.  He does sometimes need to miss work sometimes over weekends and holidays.  We completed the form previously so that he can have intermittent leave off.  He also needs to eventually follow-up for his colonoscopy will need to arrange time off for that.    His use of the medication has been chronic and stable and I provided with refills for the next couple of months.    Blood pressure and pulse were initially elevated but his pulse came down to 96 and blood pressure improved.  He will start rechecking at home as is Avapro could be increased from the current 150.  We did discuss is previous colon cancer reviewed the last oncology note from 2019.  He was due probably June of 2021 for his follow-up colonoscopy but canceled due to pandemic.  He has had work and pandemic related issues getting that set up but he does plan to try and do it this year.  I sent a message to Oncology to make specific recommendations about what follow-up testing might be indicated which appears it could include a PET scan and/or MRI of the liver.  He will definitely try to get those tests done if ordered.    Recent labs reviewed in all unremarkable including PSA.      Taking B12 which responded, level normal      Previous MRI  Impression       Interval right hepatic lobe ablation.  No residual enhancement at this site.    No new concerning liver lesions identified, noting limited evaluation due to innumerable underlying cysts of varying sizes.  Continued CT PET imaging for future follow-up recommended.      Electronically signed by: Eduin Castillo MD  Date: 02/14/2019           ROS:   CONST: weight stable. EYES: no vision change. ENT: no sore throat. CV: no chest pain w/ exertion. RESP: no shortness of breath. GI: no nausea, vomiting, diarrhea.  No dysphagia. : no urinary issues. MUSCULOSKELETAL: no new myalgias or arthralgias. SKIN: no new changes. NEURO: no focal deficits. PSYCH: no new issues. ENDOCRINE: no polyuria. HEME: no lymph nodes. ALLERGY: no general pruritis.    PAST MEDICAL HISTORY:   1. ED.   2. HTN.   3. Smoker.   4. Allergic rhinitis with exposure to mold   5. Frequent bronchitis   6. Lumbar strain due to work on [employer]   7. Large colon polyp removed before age 50 - Colon  w polyps and fair prep -rec 6 mo repeat  8.  Chronic pain referable to ankle arthritis   9.  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 One younger brother with no medical problems       Gen: no distress  Exam otherwise deferred    Tr was seen today for medication refill.    Diagnoses and all orders for this visit:    Other chronic pain, high risk medication with high risk of complications and side effects address today.  3 months or refills given.  Use his chronic inappropriate and allows him to function and continue to work.  He does plan to work another two years to pay down his house note.  He had a lot of questions about Medicare coverage versus his current insurance coverage.    Chronic pain of both ankles    Opiate dependence, continuous    Essential hypertension, chronic and stable    Cancer related pain    Metastatic colon cancer to liver, overdue for PET scan and MRI, message sent to Oncology to get some guidance on that matter.  Not quite sure exactly if I would order them correctly some may need some assistance on that as well    B12 deficiency    History of colonic polyps    Erectile dysfunction, unspecified erectile dysfunction type, chronic and stable, Cialis apparently still covered on his insurance    Tobacco use disorder    Other  orders  -     HYDROcodone-acetaminophen (NORCO) 5-325 mg per tablet; Take 1 tablet by mouth every 6 (six) hours as needed for Pain.  -     HYDROcodone-acetaminophen (NORCO) 5-325 mg per tablet; Take 1 tablet by mouth every 6 (six) hours as needed for Pain (Chronic pain, G89.4). :MORE THAN A SEVEN DAY SUPPLY OF OPIOID IS MEDICALLY NECESSARY.  -     HYDROcodone-acetaminophen (NORCO) 5-325 mg per tablet; Take 1 tablet by mouth every 6 (six) hours as needed for Pain. :MORE THAN A SEVEN DAY SUPPLY OF OPIOID IS MEDICALLY NECESSARY.

## 2022-05-23 ENCOUNTER — TELEPHONE (OUTPATIENT)
Dept: FAMILY MEDICINE | Facility: CLINIC | Age: 67
End: 2022-05-23
Payer: COMMERCIAL

## 2022-05-23 NOTE — TELEPHONE ENCOUNTER
LVM letting pt know paperwork has been received. As soon as it's completed and faxed someone will call to let him know.

## 2022-05-23 NOTE — TELEPHONE ENCOUNTER
----- Message from Ev Alvarado sent at 5/23/2022  1:13 PM CDT -----  Regarding: self  .Type: Patient Call Back    Who called: self     What is the request in detail:checking on stat of paperwork that was dropped off on Friday. Fax number is on the forms. Please call with update.     Can the clinic reply by [facility]?    Would the patient rather a call back or a response via [facility]?call     Best call back number: .[phone]

## 2022-05-23 NOTE — TELEPHONE ENCOUNTER
Patient says he is ok to to go back to work, patient says he brought paper work to  on Friday. Will contact patient when paper work is received

## 2022-05-23 NOTE — TELEPHONE ENCOUNTER
----- Message from Fabi Nicole sent at 5/23/2022  1:55 PM CDT -----  Regarding: Call  Contact: Patient  Type: Patient Call Back    Who called:Patient    What is the request in detail: Patient is requesting a call back. Please advise.    Can the clinic reply by [facility]? No    Would the patient rather a call back or a response via [facility]? Call    Best call back number: [phone]    Additional Information:Patient states with the documents can it be noted his return to work date as 05/21/2022.     Thanks

## 2022-05-25 ENCOUNTER — TELEPHONE (OUTPATIENT)
Dept: FAMILY MEDICINE | Facility: CLINIC | Age: 67
End: 2022-05-25
Payer: COMMERCIAL

## 2022-05-25 NOTE — TELEPHONE ENCOUNTER
----- Message from Michael Pendleton sent at 5/25/2022  8:05 AM CDT -----  Type: Patient Call Back    Who called:self    What is the request in detail:Pt states he works for the [employer] and he needs another letter stating he is able to return to work. Pt needs it sent to [employer] at the fax number [phone]. Pt would also like to know if letter can also be mailed to him.     Can the clinic reply by [facility]?no    Would the patient rather a call back or a response via [facility]? call    Best call back number:[phone] (home)

## 2022-05-26 ENCOUNTER — TELEPHONE (OUTPATIENT)
Dept: FAMILY MEDICINE | Facility: CLINIC | Age: 67
End: 2022-05-26
Payer: COMMERCIAL

## 2022-05-31 ENCOUNTER — TELEPHONE (OUTPATIENT)
Dept: FAMILY MEDICINE | Facility: CLINIC | Age: 67
End: 2022-05-31
Payer: COMMERCIAL

## 2022-05-31 NOTE — LETTER
May 31, 2022      [facility]  [address] [facility]  [place] [address]  Phone: [phone]  Fax: [phone]       Patient: Tr Chen   YOB: 1955  Date of Visit: 05/19/2022    To Whom It May Concern:    Magdi Chen  was at [facility] on 05/19/2022. The patient may return to work on   05/21/2022  with no restrictions. If you have any questions or concerns, or if I can be of further assistance, please do not hesitate to contact me.    Sincerely,          Boby Weinstein LPN

## 2022-06-09 ENCOUNTER — TELEPHONE (OUTPATIENT)
Dept: FAMILY MEDICINE | Facility: CLINIC | Age: 67
End: 2022-06-09
Payer: COMMERCIAL

## 2022-06-09 DIAGNOSIS — C78.7 METASTATIC COLON CANCER TO LIVER: Primary | ICD-10-CM

## 2022-06-09 DIAGNOSIS — C18.9 METASTATIC COLON CANCER TO LIVER: Primary | ICD-10-CM

## 2022-06-09 NOTE — TELEPHONE ENCOUNTER
Please call patient.  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  Okay to leave a message as well    Please advise that    Dr. Bragg did get in touch and hear back from the oncologist about what x-ray test was needed    They suggested just a CT scan of the chest abdomen and pelvis without the IV dye but will need to drink the die as usual.    They should be calling you to set that up.

## 2022-06-09 NOTE — TELEPHONE ENCOUNTER
----- Message from Latrice Jones MD sent at 6/8/2022  3:23 PM CDT -----  I think okay now to start CT c/a/p  w/out contrast.   Last MRI imaging was neg  If CT shows any concerning findings, then will proceed with PET and/or MRI imaging.       ----- Message -----  From: Aster Sanchez RN  Sent: 6/8/2022   8:13 AM CDT  To: Latrice Jones MD      ----- Message -----  From: Cornelius Bragg MD  Sent: 6/7/2022   7:11 PM CDT  To: [facility] Staff    Due to my lack of familiarity ordering PET scan, want to make sure I order the proper PET scan and MRI of the liver as not quite sure how to put the orders in and if MRI of the liver needs to be done with contrast.  Please let me know and I can order that up.    Thanks, Dr. Bragg  ----- Message -----  From: Latrice Jones MD  Sent: 4/12/2022   4:02 PM CDT  To: Cornelius Bragg MD    Lost to f/u since 2019  Lab testing cbc,cmp CEA  MRI liver and PET would be recommended  ----- Message -----  From: Cornelius Bragg MD  Sent: 4/12/2022   1:27 PM CDT  To: Latrice Jones MD    Pt lost to follow up due to his work on the [employer] and covid. He wants to know what testing he can update this year i.e PET and/or MRI of liver etc. He does plan to update the colonoscopy.     Thanks, Cornelius

## 2022-06-27 ENCOUNTER — TELEPHONE (OUTPATIENT)
Dept: FAMILY MEDICINE | Facility: CLINIC | Age: 67
End: 2022-06-27
Payer: COMMERCIAL

## 2022-06-27 NOTE — TELEPHONE ENCOUNTER
Message left again to call us back to confirm he is getting our messages. No answer to this call. Vm left.  
Patient/Caregiver requests family/friend to interpret.

## 2022-07-11 ENCOUNTER — TELEPHONE (OUTPATIENT)
Dept: FAMILY MEDICINE | Facility: CLINIC | Age: 67
End: 2022-07-11
Payer: COMMERCIAL

## 2022-07-11 NOTE — TELEPHONE ENCOUNTER
----- Message from Rebeca Rivera sent at 7/11/2022 10:35 AM CDT -----  Contact: Patient [phone]  Type: Patient Call Back    Who called: Patient     What is the request in detail: Calling to find out if a fax has been received from his company nurse Irish Hays, last week? Also need to get a copy of appointment days and times faxed to company nurse Irish Hays, before 07-17-22. 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 Please fax to [phone]. Please call pt in regards to this.                      States he may have to take off on 07-17-22 in order to make his appointments on 07-19-22. Please call.     Would the patient rather a call back or a response via [facility]? Call back    Best call back number: [phone]

## 2022-07-11 NOTE — TELEPHONE ENCOUNTER
Spoke to pt. Let him know that paperwork from nurse at his job has not been received. Pt asked if an appt reminder could be faxed to his job. Fax has been sent.

## 2022-07-14 ENCOUNTER — TELEPHONE (OUTPATIENT)
Dept: FAMILY MEDICINE | Facility: CLINIC | Age: 67
End: 2022-07-14
Payer: COMMERCIAL

## 2022-07-14 NOTE — TELEPHONE ENCOUNTER
Informed Josie that we have not received paperwork she's asking about and that she was fasting it to the wrong number. She will refax the paperwork.

## 2022-07-14 NOTE — TELEPHONE ENCOUNTER
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 They need additional documentation from the office, in order for the patient to obtain a leave of absence from work. Please return call at earliest convenience.    Can the clinic reply by [facility]?no     Would the patient rather a call back or a response via [facility]? Call back     Best call back number: [phone]

## 2022-07-19 ENCOUNTER — OFFICE VISIT (OUTPATIENT)
Dept: FAMILY MEDICINE | Facility: CLINIC | Age: 67
End: 2022-07-19
Payer: COMMERCIAL

## 2022-07-19 ENCOUNTER — TELEPHONE (OUTPATIENT)
Dept: HEMATOLOGY/ONCOLOGY | Facility: CLINIC | Age: 67
End: 2022-07-19
Payer: COMMERCIAL

## 2022-07-19 VITALS
OXYGEN SATURATION: 97 % | SYSTOLIC BLOOD PRESSURE: 124 MMHG | WEIGHT: 174.19 LBS | HEART RATE: 107 BPM | DIASTOLIC BLOOD PRESSURE: 68 MMHG | HEIGHT: 75 IN | TEMPERATURE: 99 F | BODY MASS INDEX: 21.66 KG/M2

## 2022-07-19 DIAGNOSIS — M25.571 CHRONIC PAIN OF BOTH ANKLES: ICD-10-CM

## 2022-07-19 DIAGNOSIS — C78.7 METASTATIC COLON CANCER TO LIVER: ICD-10-CM

## 2022-07-19 DIAGNOSIS — Z12.5 SCREENING FOR PROSTATE CANCER: ICD-10-CM

## 2022-07-19 DIAGNOSIS — G89.29 CHRONIC PAIN OF BOTH ANKLES: ICD-10-CM

## 2022-07-19 DIAGNOSIS — F11.20 OPIATE DEPENDENCE, CONTINUOUS: ICD-10-CM

## 2022-07-19 DIAGNOSIS — G89.29 OTHER CHRONIC PAIN: Primary | ICD-10-CM

## 2022-07-19 DIAGNOSIS — F17.200 TOBACCO USE DISORDER: ICD-10-CM

## 2022-07-19 DIAGNOSIS — C18.9 METASTATIC COLON CANCER TO LIVER: ICD-10-CM

## 2022-07-19 DIAGNOSIS — Z86.010 HISTORY OF COLONIC POLYPS: ICD-10-CM

## 2022-07-19 DIAGNOSIS — N40.0 BENIGN PROSTATIC HYPERPLASIA, UNSPECIFIED WHETHER LOWER URINARY TRACT SYMPTOMS PRESENT: ICD-10-CM

## 2022-07-19 DIAGNOSIS — E53.8 B12 DEFICIENCY: ICD-10-CM

## 2022-07-19 DIAGNOSIS — I10 ESSENTIAL HYPERTENSION: ICD-10-CM

## 2022-07-19 DIAGNOSIS — N52.9 ERECTILE DYSFUNCTION, UNSPECIFIED ERECTILE DYSFUNCTION TYPE: ICD-10-CM

## 2022-07-19 DIAGNOSIS — M25.572 CHRONIC PAIN OF BOTH ANKLES: ICD-10-CM

## 2022-07-19 DIAGNOSIS — G89.3 CANCER RELATED PAIN: ICD-10-CM

## 2022-07-19 PROCEDURE — 3078F DIAST BP <80 MM HG: CPT | Mod: CPTII,S$GLB,, | Performed by: INTERNAL MEDICINE

## 2022-07-19 PROCEDURE — 3008F PR BODY MASS INDEX (BMI) DOCUMENTED: ICD-10-PCS | Mod: CPTII,S$GLB,, | Performed by: INTERNAL MEDICINE

## 2022-07-19 PROCEDURE — 99999 PR PBB SHADOW E&M-EST. PATIENT-LVL IV: CPT | Mod: PBBFAC,,, | Performed by: INTERNAL MEDICINE

## 2022-07-19 PROCEDURE — 99999 PR PBB SHADOW E&M-EST. PATIENT-LVL IV: ICD-10-PCS | Mod: PBBFAC,,, | Performed by: INTERNAL MEDICINE

## 2022-07-19 PROCEDURE — 3074F SYST BP LT 130 MM HG: CPT | Mod: CPTII,S$GLB,, | Performed by: INTERNAL MEDICINE

## 2022-07-19 PROCEDURE — 3074F PR MOST RECENT SYSTOLIC BLOOD PRESSURE < 130 MM HG: ICD-10-PCS | Mod: CPTII,S$GLB,, | Performed by: INTERNAL MEDICINE

## 2022-07-19 PROCEDURE — 3078F PR MOST RECENT DIASTOLIC BLOOD PRESSURE < 80 MM HG: ICD-10-PCS | Mod: CPTII,S$GLB,, | Performed by: INTERNAL MEDICINE

## 2022-07-19 PROCEDURE — 99214 OFFICE O/P EST MOD 30 MIN: CPT | Mod: S$GLB,,, | Performed by: INTERNAL MEDICINE

## 2022-07-19 PROCEDURE — 1159F PR MEDICATION LIST DOCUMENTED IN MEDICAL RECORD: ICD-10-PCS | Mod: CPTII,S$GLB,, | Performed by: INTERNAL MEDICINE

## 2022-07-19 PROCEDURE — 3008F BODY MASS INDEX DOCD: CPT | Mod: CPTII,S$GLB,, | Performed by: INTERNAL MEDICINE

## 2022-07-19 PROCEDURE — 1159F MED LIST DOCD IN RCRD: CPT | Mod: CPTII,S$GLB,, | Performed by: INTERNAL MEDICINE

## 2022-07-19 PROCEDURE — 99214 PR OFFICE/OUTPT VISIT, EST, LEVL IV, 30-39 MIN: ICD-10-PCS | Mod: S$GLB,,, | Performed by: INTERNAL MEDICINE

## 2022-07-19 RX ORDER — HYDROCODONE BITARTRATE AND ACETAMINOPHEN 5; 325 MG/1; MG/1
1 TABLET ORAL EVERY 6 HOURS PRN
Qty: 120 TABLET | Refills: 0 | Status: SHIPPED | OUTPATIENT
Start: 2022-09-19 | End: 2023-01-19 | Stop reason: SDUPTHER

## 2022-07-19 RX ORDER — HYDROCODONE BITARTRATE AND ACETAMINOPHEN 5; 325 MG/1; MG/1
1 TABLET ORAL EVERY 6 HOURS PRN
Qty: 120 TABLET | Refills: 0 | Status: SHIPPED | OUTPATIENT
Start: 2022-08-19 | End: 2023-01-19 | Stop reason: SDUPTHER

## 2022-07-19 RX ORDER — HYDROCODONE BITARTRATE AND ACETAMINOPHEN 5; 325 MG/1; MG/1
1 TABLET ORAL EVERY 6 HOURS PRN
Qty: 120 TABLET | Refills: 0 | Status: SHIPPED | OUTPATIENT
Start: 2022-07-19 | End: 2023-01-19 | Stop reason: SDUPTHER

## 2022-07-19 NOTE — TELEPHONE ENCOUNTER
Tc to pt to schedule a NP appt from referral   Pt had hx of colon ca "years back"   Stated he was told by PCP that he needed follow up as he has not had an check up  in years   Numerous appointment dates & times offered to pt he declined them all Stated he works for [employer] and it has to fit in his schedule He said it is very difficult to get off    He advised nurse that he will call make to make appointment when he can fit it in

## 2022-07-19 NOTE — PROGRESS NOTES
Chief complaint follow-up on pain medications and discussed follow-up on cancer    67-year-old white male .  He is on his yearly FMLA form related to his colon cancer treatment and arthritis of both ankles.  He does sometimes need to miss work sometimes over weekends and holidays.  We completed the form previously so that he can have intermittent leave off.  He also needs to eventually follow-up for his colonoscopy will need to arrange time off for that.    His use of the medication has been chronic and stable and I provided with refills for the next couple of months.    Blood pressure and pulse were initially elevated but his pulse came down to 96 and blood pressure improved.  He will start rechecking at home as is Avapro could be increased from the current 150.  We did discuss is previous colon cancer reviewed the last oncology note from 2019.  He was due probably June of 2021 for his follow-up colonoscopy but canceled due to pandemic.  He has had work and pandemic related issues getting that set up but he does plan to try and do it this year.  I sent a message to Oncology to make specific recommendations about what follow-up testing might be indicated which appears it could include a PET scan and/or MRI of the liver.  He will definitely try to get those tests done if ordered.    Messaged Oncology who rec start with CT but ins denied as below  The initial review of your request for CT CHEST ABDOMEN PELVIS WITHOUT CONTRAST(XPD) 61522 and - CO CT SCAN,ABDOMENT AND PELVIS,W/O CONTRAST   01533 has been completed by a physician reviewer and the clinical information received does not support the requirements of medical necessity guidelines for this procedure. The auth is in the process of being DENIED by a physician reviewer.   Unclear as to why not approved by insurance for perhaps because of the duration since his prior CT being a couple of years and several years since seeing Oncology.  Explained that to him.  We  will refer him back call to get re-established and have appropriate testing done.  Be due for his PSA in August so will add that as I am sure oncology will order some future lab work including CEA level which looks like it is phone two years ago or so.    Previous labs reviewed in all unremarkable including PSA.      Taking B12 which responded, level normal      Previous MRI  Impression       Interval right hepatic lobe ablation.  No residual enhancement at this site.    No new concerning liver lesions identified, noting limited evaluation due to innumerable underlying cysts of varying sizes.  Continued CT PET imaging for future follow-up recommended.      Electronically signed by: Eduin Castillo MD  Date: 2019           ROS:   CONST: weight stable. EYES: no vision change. ENT: no sore throat. CV: no chest pain w/ exertion. RESP: no shortness of breath. GI: no nausea, vomiting, diarrhea. No dysphagia. : no urinary issues. MUSCULOSKELETAL: no new myalgias or arthralgias. SKIN: no new changes. NEURO: no focal deficits. PSYCH: no new issues. ENDOCRINE: no polyuria. HEME: no lymph nodes. ALLERGY: no general pruritis.    PAST MEDICAL HISTORY:   1. ED.   2. HTN.   3. Smoker.   4. Allergic rhinitis with exposure to mold   5. Frequent bronchitis   6. Lumbar strain due to work on [employer]   7. Large colon polyp removed before age 50 - Colon 12/20 w polyps and fair prep -rec 6 mo repeat  8.  Chronic pain referable to ankle arthritis   9.  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 One younger brother with no medical problems       Gen: no distress  Exam otherwise deferred      Tr was seen today for annual exam.    Diagnoses and all orders for this visit:    Other chronic pain, high risk medication with high risk  of complications of side effects address today.  Pain meds refilled.  Will do [place] paperwork.  I faxed him a work release note for later this week and will await the paperwork and completed.    Chronic pain of both ankles    Metastatic colon cancer to liver  -     Ambulatory referral/consult to Hematology / Oncology; Future    Opiate dependence, continuous     Essential hypertension, chronic and stable    Cancer related pain    History of colonic polyps    Erectile dysfunction, unspecified erectile dysfunction type    B12 deficiency, continue B12    Tobacco use disorder    Screening for prostate cancer, will be due for PSA shortly    Benign prostatic hyperplasia, unspecified whether lower urinary tract symptoms present  -     Prostate Specific Antigen, Diagnostic; Future    Other orders  -     HYDROcodone-acetaminophen (NORCO) 5-325 mg per tablet; Take 1 tablet by mouth every 6 (six) hours as needed for Pain (Chronic pain, G89.4). :MORE THAN A SEVEN DAY SUPPLY OF OPIOID IS MEDICALLY NECESSARY.  -     HYDROcodone-acetaminophen (NORCO) 5-325 mg per tablet; Take 1 tablet by mouth every 6 (six) hours as needed for Pain (Chronic pain, G89.4). :MORE THAN A SEVEN DAY SUPPLY OF OPIOID IS MEDICALLY NECESSARY.  -     HYDROcodone-acetaminophen (NORCO) 5-325 mg per tablet; Take 1 tablet by mouth every 6 (six) hours as needed for Pain (Chronic pain, G89.4). :MORE THAN A SEVEN DAY SUPPLY OF OPIOID IS MEDICALLY NECESSARY.

## 2022-09-07 DIAGNOSIS — I10 HTN (HYPERTENSION): ICD-10-CM

## 2022-10-05 DIAGNOSIS — I10 HTN (HYPERTENSION): ICD-10-CM

## 2022-10-19 ENCOUNTER — TELEPHONE (OUTPATIENT)
Dept: HEMATOLOGY/ONCOLOGY | Facility: CLINIC | Age: 67
End: 2022-10-19
Payer: MEDICARE

## 2022-10-19 ENCOUNTER — OFFICE VISIT (OUTPATIENT)
Dept: FAMILY MEDICINE | Facility: CLINIC | Age: 67
End: 2022-10-19
Payer: COMMERCIAL

## 2022-10-19 ENCOUNTER — LAB VISIT (OUTPATIENT)
Dept: LAB | Facility: HOSPITAL | Age: 67
End: 2022-10-19
Attending: INTERNAL MEDICINE
Payer: MEDICARE

## 2022-10-19 VITALS
OXYGEN SATURATION: 98 % | SYSTOLIC BLOOD PRESSURE: 128 MMHG | HEIGHT: 75 IN | TEMPERATURE: 98 F | BODY MASS INDEX: 21.27 KG/M2 | WEIGHT: 171.06 LBS | DIASTOLIC BLOOD PRESSURE: 70 MMHG | HEART RATE: 85 BPM

## 2022-10-19 DIAGNOSIS — F11.20 OPIATE DEPENDENCE, CONTINUOUS: ICD-10-CM

## 2022-10-19 DIAGNOSIS — N40.0 BENIGN PROSTATIC HYPERPLASIA, UNSPECIFIED WHETHER LOWER URINARY TRACT SYMPTOMS PRESENT: ICD-10-CM

## 2022-10-19 DIAGNOSIS — C78.7 METASTATIC COLON CANCER TO LIVER: ICD-10-CM

## 2022-10-19 DIAGNOSIS — C18.9 METASTATIC COLON CANCER TO LIVER: ICD-10-CM

## 2022-10-19 DIAGNOSIS — M25.572 CHRONIC PAIN OF BOTH ANKLES: ICD-10-CM

## 2022-10-19 DIAGNOSIS — G89.29 CHRONIC PAIN OF BOTH ANKLES: ICD-10-CM

## 2022-10-19 DIAGNOSIS — Z12.5 SCREENING FOR PROSTATE CANCER: ICD-10-CM

## 2022-10-19 DIAGNOSIS — I10 ESSENTIAL HYPERTENSION: ICD-10-CM

## 2022-10-19 DIAGNOSIS — F17.200 TOBACCO USE DISORDER: ICD-10-CM

## 2022-10-19 DIAGNOSIS — M25.571 CHRONIC PAIN OF BOTH ANKLES: ICD-10-CM

## 2022-10-19 DIAGNOSIS — G89.29 OTHER CHRONIC PAIN: Primary | ICD-10-CM

## 2022-10-19 DIAGNOSIS — Z86.010 HISTORY OF COLONIC POLYPS: ICD-10-CM

## 2022-10-19 DIAGNOSIS — K76.9 LIVER LESION: ICD-10-CM

## 2022-10-19 DIAGNOSIS — N52.9 ERECTILE DYSFUNCTION, UNSPECIFIED ERECTILE DYSFUNCTION TYPE: ICD-10-CM

## 2022-10-19 DIAGNOSIS — G89.3 CANCER RELATED PAIN: ICD-10-CM

## 2022-10-19 LAB
ALBUMIN SERPL BCP-MCNC: 3.3 G/DL (ref 3.5–5.2)
ALP SERPL-CCNC: 128 U/L (ref 55–135)
ALT SERPL W/O P-5'-P-CCNC: 8 U/L (ref 10–44)
ANION GAP SERPL CALC-SCNC: 11 MMOL/L (ref 8–16)
AST SERPL-CCNC: 20 U/L (ref 10–40)
BASOPHILS # BLD AUTO: 0.04 K/UL (ref 0–0.2)
BASOPHILS NFR BLD: 0.5 % (ref 0–1.9)
BILIRUB SERPL-MCNC: 0.4 MG/DL (ref 0.1–1)
BUN SERPL-MCNC: 6 MG/DL (ref 8–23)
CALCIUM SERPL-MCNC: 9.2 MG/DL (ref 8.7–10.5)
CEA SERPL-MCNC: 21.4 NG/ML (ref 0–5)
CHLORIDE SERPL-SCNC: 100 MMOL/L (ref 95–110)
CO2 SERPL-SCNC: 23 MMOL/L (ref 23–29)
COMPLEXED PSA SERPL-MCNC: 1.3 NG/ML (ref 0–4)
CREAT SERPL-MCNC: 0.7 MG/DL (ref 0.5–1.4)
DIFFERENTIAL METHOD: ABNORMAL
EOSINOPHIL # BLD AUTO: 0.1 K/UL (ref 0–0.5)
EOSINOPHIL NFR BLD: 1.3 % (ref 0–8)
ERYTHROCYTE [DISTWIDTH] IN BLOOD BY AUTOMATED COUNT: 14.3 % (ref 11.5–14.5)
EST. GFR  (NO RACE VARIABLE): >60 ML/MIN/1.73 M^2
ESTIMATED AVG GLUCOSE: 100 MG/DL (ref 68–131)
GLUCOSE SERPL-MCNC: 84 MG/DL (ref 70–110)
HBA1C MFR BLD: 5.1 % (ref 4–5.6)
HCT VFR BLD AUTO: 43 % (ref 40–54)
HGB BLD-MCNC: 13.2 G/DL (ref 14–18)
IMM GRANULOCYTES # BLD AUTO: 0.04 K/UL (ref 0–0.04)
IMM GRANULOCYTES NFR BLD AUTO: 0.5 % (ref 0–0.5)
LYMPHOCYTES # BLD AUTO: 1.3 K/UL (ref 1–4.8)
LYMPHOCYTES NFR BLD: 16.3 % (ref 18–48)
MCH RBC QN AUTO: 28.6 PG (ref 27–31)
MCHC RBC AUTO-ENTMCNC: 30.7 G/DL (ref 32–36)
MCV RBC AUTO: 93 FL (ref 82–98)
MONOCYTES # BLD AUTO: 0.8 K/UL (ref 0.3–1)
MONOCYTES NFR BLD: 10.1 % (ref 4–15)
NEUTROPHILS # BLD AUTO: 5.6 K/UL (ref 1.8–7.7)
NEUTROPHILS NFR BLD: 71.3 % (ref 38–73)
NRBC BLD-RTO: 0 /100 WBC
PLATELET # BLD AUTO: 395 K/UL (ref 150–450)
PMV BLD AUTO: 9.4 FL (ref 9.2–12.9)
POTASSIUM SERPL-SCNC: 4.2 MMOL/L (ref 3.5–5.1)
PROT SERPL-MCNC: 7.6 G/DL (ref 6–8.4)
RBC # BLD AUTO: 4.61 M/UL (ref 4.6–6.2)
SODIUM SERPL-SCNC: 134 MMOL/L (ref 136–145)
TSH SERPL DL<=0.005 MIU/L-ACNC: 1.64 UIU/ML (ref 0.4–4)
WBC # BLD AUTO: 7.86 K/UL (ref 3.9–12.7)

## 2022-10-19 PROCEDURE — 99999 PR PBB SHADOW E&M-EST. PATIENT-LVL IV: ICD-10-PCS | Mod: PBBFAC,,, | Performed by: INTERNAL MEDICINE

## 2022-10-19 PROCEDURE — 83036 HEMOGLOBIN GLYCOSYLATED A1C: CPT | Performed by: INTERNAL MEDICINE

## 2022-10-19 PROCEDURE — 80053 COMPREHEN METABOLIC PANEL: CPT | Performed by: INTERNAL MEDICINE

## 2022-10-19 PROCEDURE — 1101F PR PT FALLS ASSESS DOC 0-1 FALLS W/OUT INJ PAST YR: ICD-10-PCS | Mod: CPTII,S$GLB,, | Performed by: INTERNAL MEDICINE

## 2022-10-19 PROCEDURE — 1101F PT FALLS ASSESS-DOCD LE1/YR: CPT | Mod: CPTII,S$GLB,, | Performed by: INTERNAL MEDICINE

## 2022-10-19 PROCEDURE — 1159F MED LIST DOCD IN RCRD: CPT | Mod: CPTII,S$GLB,, | Performed by: INTERNAL MEDICINE

## 2022-10-19 PROCEDURE — 82378 CARCINOEMBRYONIC ANTIGEN: CPT | Performed by: INTERNAL MEDICINE

## 2022-10-19 PROCEDURE — 85025 COMPLETE CBC W/AUTO DIFF WBC: CPT | Performed by: INTERNAL MEDICINE

## 2022-10-19 PROCEDURE — 1159F PR MEDICATION LIST DOCUMENTED IN MEDICAL RECORD: ICD-10-PCS | Mod: CPTII,S$GLB,, | Performed by: INTERNAL MEDICINE

## 2022-10-19 PROCEDURE — 3074F SYST BP LT 130 MM HG: CPT | Mod: CPTII,S$GLB,, | Performed by: INTERNAL MEDICINE

## 2022-10-19 PROCEDURE — 3074F PR MOST RECENT SYSTOLIC BLOOD PRESSURE < 130 MM HG: ICD-10-PCS | Mod: CPTII,S$GLB,, | Performed by: INTERNAL MEDICINE

## 2022-10-19 PROCEDURE — 3288F FALL RISK ASSESSMENT DOCD: CPT | Mod: CPTII,S$GLB,, | Performed by: INTERNAL MEDICINE

## 2022-10-19 PROCEDURE — 84443 ASSAY THYROID STIM HORMONE: CPT | Performed by: INTERNAL MEDICINE

## 2022-10-19 PROCEDURE — 3078F DIAST BP <80 MM HG: CPT | Mod: CPTII,S$GLB,, | Performed by: INTERNAL MEDICINE

## 2022-10-19 PROCEDURE — 99215 OFFICE O/P EST HI 40 MIN: CPT | Mod: S$GLB,,, | Performed by: INTERNAL MEDICINE

## 2022-10-19 PROCEDURE — 3078F PR MOST RECENT DIASTOLIC BLOOD PRESSURE < 80 MM HG: ICD-10-PCS | Mod: CPTII,S$GLB,, | Performed by: INTERNAL MEDICINE

## 2022-10-19 PROCEDURE — 3288F PR FALLS RISK ASSESSMENT DOCUMENTED: ICD-10-PCS | Mod: CPTII,S$GLB,, | Performed by: INTERNAL MEDICINE

## 2022-10-19 PROCEDURE — 36415 COLL VENOUS BLD VENIPUNCTURE: CPT | Mod: PO | Performed by: INTERNAL MEDICINE

## 2022-10-19 PROCEDURE — 84153 ASSAY OF PSA TOTAL: CPT | Performed by: INTERNAL MEDICINE

## 2022-10-19 PROCEDURE — 99999 PR PBB SHADOW E&M-EST. PATIENT-LVL IV: CPT | Mod: PBBFAC,,, | Performed by: INTERNAL MEDICINE

## 2022-10-19 PROCEDURE — 99215 PR OFFICE/OUTPT VISIT, EST, LEVL V, 40-54 MIN: ICD-10-PCS | Mod: S$GLB,,, | Performed by: INTERNAL MEDICINE

## 2022-10-19 RX ORDER — HYDROCODONE BITARTRATE AND ACETAMINOPHEN 5; 325 MG/1; MG/1
1 TABLET ORAL EVERY 6 HOURS PRN
Qty: 120 TABLET | Refills: 0 | Status: SHIPPED | OUTPATIENT
Start: 2022-10-19 | End: 2023-05-11

## 2022-10-19 RX ORDER — HYDROCODONE BITARTRATE AND ACETAMINOPHEN 5; 325 MG/1; MG/1
1 TABLET ORAL EVERY 6 HOURS PRN
Qty: 120 TABLET | Refills: 0 | Status: SHIPPED | OUTPATIENT
Start: 2022-11-19 | End: 2023-05-11

## 2022-10-19 RX ORDER — HYDROCODONE BITARTRATE AND ACETAMINOPHEN 5; 325 MG/1; MG/1
1 TABLET ORAL EVERY 6 HOURS PRN
Qty: 120 TABLET | Refills: 0 | Status: SHIPPED | OUTPATIENT
Start: 2022-12-19 | End: 2023-05-11

## 2022-10-19 NOTE — PROGRESS NOTES
Chief complaint follow-up on pain medications and discussed follow-up on cancer    67-year-old white male .  He is on his yearly FMLA form related to his colon cancer treatment and arthritis of both ankles.  He does sometimes need to miss work sometimes over weekends and holidays.  We completed the form previously so that he can have intermittent leave off.  He also needs to eventually follow-up for his colonoscopy will need to arrange time off for that.    His use of the medication has been chronic and stable and I provided with refills for the next couple of months.      He is willing to get the colonoscopy so will put in the order.  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  Sounds appropriate.  I have made two separate work notes and will fax them to the work.  Apparently they said they could not except his FMLA form which he has a copy of which has all the details and made a correction about the 24 hour issue on that.  He also is interested in seeing Hematology and has been trying to get in touch with the office.  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  I will send a message to the oncology staff.  Also will attempt to order a CT scan of the abdomen and pelvis.  Previously the chest abdomen and pelvis CT was denied by the insurance for some unknown reason even though was linked to a diagnosis of colon cancer, metastatic colon cancer and liver lesion.  I see no logic and that and he does need and is overdue for reassessment of his potential metastatic cancer which at least by on the last reports was slightly enlarging.    Polyps 12/2020 -Repeat colonoscopy within 6 months because the                        bowel  preparation was suboptimal.     Blood pressure appears to be running good.  We did discuss is previous colon cancer reviewed the last oncology note from 2019.  He was due probably June of 2021 for his follow-up colonoscopy but canceled due to pandemic.  He has had work and pandemic related issues getting that set up but he does plan to try and do it this year.  I sent a message to Oncology to make specific recommendations about what follow-up testing might be indicated which appears it could include a PET scan and/or MRI of the liver.  He will definitely try to get those tests done if ordered.    Messaged Oncology who rec start with CT but ins denied as below  The initial review of your request for CT CHEST ABDOMEN PELVIS WITHOUT CONTRAST(XPD) 34866 and - DC CT SCAN,ABDOMENT AND PELVIS,W/O CONTRAST   39784 has been completed by a physician reviewer and the clinical information received does not support the requirements of medical necessity guidelines for this procedure. The auth is in the process of being DENIED by a physician reviewer.   Unclear as to why not approved by insurance for perhaps because of the duration since his prior CT being a couple of years and several years since seeing Oncology.  Explained that to him.  We will refer him back call to get re-established and have appropriate testing done.  Be due for his PSA in August so will add that as I am sure oncology will order some future lab work including CEA level which looks like it is phone two years ago or so.  Onc phone note--NP appt from referral   Pt had hx of colon ca "years back"   Stated he was told by PCP that he needed follow up as he has not had an check up  in years   Numerous appointment dates & times offered to pt he declined them all Stated he works for [employer] and it has to fit in his schedule He said it is very difficult to get off    He advised nurse that he will call make to make appointment when he can fit it in   Previous labs  reviewed in all unremarkable including PSA.      Taking B12 which responded, level normal      Previous MRI  Impression       Interval right hepatic lobe ablation.  No residual enhancement at this site.    No new concerning liver lesions identified, noting limited evaluation due to innumerable underlying cysts of varying sizes.  Continued CT PET imaging for future follow-up recommended.      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 Over 70 min  minutes of total time spent on the encounter, which includes face to face time and non-face to face time preparing to see the patient (eg, review of tests), Obtaining and/or reviewing separately obtained history, Documenting clinical information in the electronic or other health record, Independently interpreting results (not separately reported) and communicating results to the patient/family/caregiver, or Care coordination (not separately reported).     ROS:   CONST: weight stable. EYES: no vision change. ENT: no sore throat. CV: no chest pain w/ exertion. RESP: no shortness of breath. GI: no nausea, vomiting, diarrhea. No dysphagia. : no urinary issues. MUSCULOSKELETAL: no new myalgias or arthralgias. SKIN: no new changes. NEURO: no focal deficits. PSYCH: no new issues. ENDOCRINE: no polyuria. HEME: no lymph nodes. ALLERGY: no general pruritis.    PAST MEDICAL HISTORY:   1. ED.   2. HTN.   3. Smoker.   4. Allergic rhinitis with exposure to mold   5. Frequent bronchitis   6. Lumbar strain due to work on [employer]   7. Large colon polyp removed before age 50 - Colon 12/20 w polyps and fair prep -rec 6 mo repeat  8.  Chronic pain referable to ankle arthritis   9.  Colon cancer 2016 with low anterior resection- chemo/XRT 2017    Social history: Still works both one pack per day, no alcohol previously  with kids,  works on the [employer]     Family history: Mom  of breast cancer, father  with dementia. One younger brother with no medical problems       Gen: no distress  Exam otherwise deferred    Tr was seen today for annual exam.    Diagnoses and all orders for this visit:    Other chronic pain, high risk medications with high risk of complications and side effects address today and his use has been chronic and stable and appropriate and allows him to function    Chronic pain of both ankles    Metastatic colon cancer to liver, overdue for reassessment  -     CEA; Future  -     CT Abdomen Pelvis With Contrast; Future  -     Ambulatory referral/consult to Hematology / Oncology; Future  -     Ambulatory referral/consult to Endo Procedure ; Future    Opiate dependence, continuous    Essential hypertension, chronic and stable  -     Prostate Specific Antigen, Diagnostic; Future  -     CBC Auto Differential; Future  -     Comprehensive Metabolic Panel; Future  -     Hemoglobin A1C; Future  -     TSH; Future    Cancer related pain  -     CT Abdomen Pelvis With Contrast; Future  -     Ambulatory referral/consult to Hematology / Oncology; Future    History of colonic polyps  -     Ambulatory referral/consult to Endo Procedure ; Future    Erectile dysfunction, unspecified erectile dysfunction type    Tobacco use disorder    Benign prostatic hyperplasia, unspecified whether lower urinary tract symptoms present  -     Prostate Specific Antigen, Diagnostic; Future    Screening for prostate cancer  -     Prostate Specific Antigen, Diagnostic; Future    Liver lesion    Other orders  -     HYDROcodone-acetaminophen (NORCO) 5-325 mg per tablet; Take 1 tablet by mouth every 6 (six) hours as needed for Pain.  -     HYDROcodone-acetaminophen (NORCO) 5-325 mg per tablet; Take 1 tablet by mouth every 6 (six) hours as needed for Pain (Chronic pain, G89.4). :MORE THAN A SEVEN DAY SUPPLY OF OPIOID IS MEDICALLY NECESSARY.  -      HYDROcodone-acetaminophen (NORCO) 5-325 mg per tablet; Take 1 tablet by mouth every 6 (six) hours as needed for Pain. :MORE THAN A SEVEN DAY SUPPLY OF OPIOID IS MEDICALLY NECESSARY.

## 2022-10-20 ENCOUNTER — TELEPHONE (OUTPATIENT)
Dept: HEMATOLOGY/ONCOLOGY | Facility: CLINIC | Age: 67
End: 2022-10-20
Payer: MEDICARE

## 2022-10-20 ENCOUNTER — OFFICE VISIT (OUTPATIENT)
Dept: HEMATOLOGY/ONCOLOGY | Facility: CLINIC | Age: 67
End: 2022-10-20
Payer: COMMERCIAL

## 2022-10-20 VITALS
HEART RATE: 83 BPM | BODY MASS INDEX: 21.11 KG/M2 | HEIGHT: 75 IN | DIASTOLIC BLOOD PRESSURE: 73 MMHG | OXYGEN SATURATION: 98 % | SYSTOLIC BLOOD PRESSURE: 151 MMHG | WEIGHT: 169.75 LBS | TEMPERATURE: 98 F

## 2022-10-20 DIAGNOSIS — C20 RECTAL CARCINOMA: Primary | ICD-10-CM

## 2022-10-20 DIAGNOSIS — C78.7 SECONDARY MALIGNANT NEOPLASM OF LIVER: ICD-10-CM

## 2022-10-20 DIAGNOSIS — R97.0 ELEVATED CEA: ICD-10-CM

## 2022-10-20 PROCEDURE — 3008F BODY MASS INDEX DOCD: CPT | Mod: CPTII,S$GLB,, | Performed by: INTERNAL MEDICINE

## 2022-10-20 PROCEDURE — 3008F PR BODY MASS INDEX (BMI) DOCUMENTED: ICD-10-PCS | Mod: CPTII,S$GLB,, | Performed by: INTERNAL MEDICINE

## 2022-10-20 PROCEDURE — 1159F MED LIST DOCD IN RCRD: CPT | Mod: CPTII,S$GLB,, | Performed by: INTERNAL MEDICINE

## 2022-10-20 PROCEDURE — 3044F PR MOST RECENT HEMOGLOBIN A1C LEVEL <7.0%: ICD-10-PCS | Mod: CPTII,S$GLB,, | Performed by: INTERNAL MEDICINE

## 2022-10-20 PROCEDURE — 1101F PR PT FALLS ASSESS DOC 0-1 FALLS W/OUT INJ PAST YR: ICD-10-PCS | Mod: CPTII,S$GLB,, | Performed by: INTERNAL MEDICINE

## 2022-10-20 PROCEDURE — 3044F HG A1C LEVEL LT 7.0%: CPT | Mod: CPTII,S$GLB,, | Performed by: INTERNAL MEDICINE

## 2022-10-20 PROCEDURE — 99999 PR PBB SHADOW E&M-EST. PATIENT-LVL V: CPT | Mod: PBBFAC,,, | Performed by: INTERNAL MEDICINE

## 2022-10-20 PROCEDURE — 3288F PR FALLS RISK ASSESSMENT DOCUMENTED: ICD-10-PCS | Mod: CPTII,S$GLB,, | Performed by: INTERNAL MEDICINE

## 2022-10-20 PROCEDURE — 3078F DIAST BP <80 MM HG: CPT | Mod: CPTII,S$GLB,, | Performed by: INTERNAL MEDICINE

## 2022-10-20 PROCEDURE — 99215 PR OFFICE/OUTPT VISIT, EST, LEVL V, 40-54 MIN: ICD-10-PCS | Mod: S$GLB,,, | Performed by: INTERNAL MEDICINE

## 2022-10-20 PROCEDURE — 1126F AMNT PAIN NOTED NONE PRSNT: CPT | Mod: CPTII,S$GLB,, | Performed by: INTERNAL MEDICINE

## 2022-10-20 PROCEDURE — 3077F PR MOST RECENT SYSTOLIC BLOOD PRESSURE >= 140 MM HG: ICD-10-PCS | Mod: CPTII,S$GLB,, | Performed by: INTERNAL MEDICINE

## 2022-10-20 PROCEDURE — 1159F PR MEDICATION LIST DOCUMENTED IN MEDICAL RECORD: ICD-10-PCS | Mod: CPTII,S$GLB,, | Performed by: INTERNAL MEDICINE

## 2022-10-20 PROCEDURE — 1126F PR PAIN SEVERITY QUANTIFIED, NO PAIN PRESENT: ICD-10-PCS | Mod: CPTII,S$GLB,, | Performed by: INTERNAL MEDICINE

## 2022-10-20 PROCEDURE — 3288F FALL RISK ASSESSMENT DOCD: CPT | Mod: CPTII,S$GLB,, | Performed by: INTERNAL MEDICINE

## 2022-10-20 PROCEDURE — 99999 PR PBB SHADOW E&M-EST. PATIENT-LVL V: ICD-10-PCS | Mod: PBBFAC,,, | Performed by: INTERNAL MEDICINE

## 2022-10-20 PROCEDURE — 1101F PT FALLS ASSESS-DOCD LE1/YR: CPT | Mod: CPTII,S$GLB,, | Performed by: INTERNAL MEDICINE

## 2022-10-20 PROCEDURE — 99215 OFFICE O/P EST HI 40 MIN: CPT | Mod: S$GLB,,, | Performed by: INTERNAL MEDICINE

## 2022-10-20 PROCEDURE — 3078F PR MOST RECENT DIASTOLIC BLOOD PRESSURE < 80 MM HG: ICD-10-PCS | Mod: CPTII,S$GLB,, | Performed by: INTERNAL MEDICINE

## 2022-10-20 PROCEDURE — 3077F SYST BP >= 140 MM HG: CPT | Mod: CPTII,S$GLB,, | Performed by: INTERNAL MEDICINE

## 2022-10-20 NOTE — Clinical Note
CT chest ( attempt to schedule  same day at CT a/p) Schedule PET/CT  Cbc,cmp , CEA prior to f/u 3 weeks or 1 mo ( after scans)

## 2022-10-20 NOTE — TELEPHONE ENCOUNTER
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 Appointment faxed per patient request.

## 2022-10-20 NOTE — PROGRESS NOTES
Subjective:       Patient ID: Tr Chen is a 67 y.o. male.    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 CT imaging studies negative for evidence of distant disease.  1/27/2017 PET/CT imaging  reveals   hepatic metastatic lesion . He subsequently underwent CT guided liver bx 2/3/2017 - NEG for malignancy .    status post LAR,12/27/2016.pT3 pN1cM0 Stage IIIB  He is s/p adjuvant XELOX cycle 4  Completed 4/17/2017  Followed by chemo/RT initiated  5/22/2017   He completed XRT July 6, 2017  s/p partial completion of XELODA approx 6/21/2017 ( due to ASE)    PET scan on 8/16/2018 showed continued growth of this lesion to 3.8cm. LIver lesion was biopsied again on 10/18/2018. Pathology revealed adenocarcinoma compatible with a metastatic carcinoma of colorectal origin.    Pt delayed follow up imaging ( work related issues)   He works for the [employer] and works for weeks at at time sometims  Recent PET/CT 12/18/2018 reveals  Isolated right lobe liver metastasis more metabolic from the prior study more prominent worrisome for progression and poor response to therapy.  No new lesions are seen.   Pt followed by IR   s/p ablation of liver lesion 12/31/2018   MRI abd w w/o contrast 2/14/2019 reveals interval right hepatic lobe ablation with no residual enhancement at site,no new concerning liver lesions noed      Interval Hx: Lost to follow-up   Last follow-up 2019  He continues to work for [employer] full time  He needs his workup coordinated with his  work schedule   He continues to  work full-time   Appetite and weight stable   No shortness of breath or chest pain  No abd pain   No N/V   No melena, hematochezia   He continues to smoke   No cough /SOB     Surveillance colonoscopy 12/27/2017- NEG        Prev Hx: Pt  with past medical history of colonic polyps, hypertension, tobacco abuse, seen today in consultation for  diagnosed rectosigmoid adenocarcinoma.  The patient reports intermittent rectal bleeding for the past 3 to 4 months.  He subsequently underwent a colonoscopy on 11/21/2016, which revealed polyps noted in the descending colon, sigmoid colon, and rectum and also noted for a malignant-appearing partially obstructing tumor in the distal sigmoid colon with ulceration. Pathology of the rectosigmoid mass revealed a tubulovillous adenoma and polyps benign..The patient reports he underwent a colonoscopy during his 40s for rectal bleeding.  He was diagnosed with a large bleeding polyp, for which he underwent removal.  He did not undergo surveillance colonoscopies as advised.  No family history of colon cancer.  CT of the abdomen and pelvis on 11/22/2016 revealed thickening of the rectal wall, findings, rectosigmoid neoplasm at the rectosigmoid junction and numerous hepatic lesions compatible with hepatic cysts. He  subsequently underwent a rectal sigmoidectomy on 12/27/2016 .  Pathology revealed an adenocarcinoma, low grade, clear margins. The patient underwent removal of 24 lymph nodes with 0 lymph nodes involved.  Evidence of tumor deposits noted with pathologic staging pT3, pN1c.            Review of Systems   Constitutional:  Negative for appetite change, fatigue, fever and unexpected weight change.   HENT:  Negative for mouth sores.    Eyes:  Negative for visual disturbance.   Respiratory:  Negative for cough and shortness of breath.    Cardiovascular:  Negative for chest pain.   Gastrointestinal:  Negative for abdominal pain and diarrhea.  "  Genitourinary:  Negative for frequency.   Musculoskeletal:  Negative for back pain.   Integumentary:  Negative for rash.   Neurological:  Negative for headaches.   Hematological:  Negative for adenopathy.   Psychiatric/Behavioral:  The patient is not nervous/anxious.        Objective:       Vitals:    10/20/22 1317   BP: (!) 151/73   BP Location: Right arm   Patient Position: Sitting   BP Method: Large (Automatic)   Pulse: 83   Temp: 98.2 °F (36.8 °C)   TempSrc: Oral   SpO2: 98%   Weight: 77 kg (169 lb 12.1 oz)   Height: 6' 3" (1.905 m)       Physical Exam  Constitutional:       Appearance: He is well-developed.   HENT:      Head: Normocephalic.      Mouth/Throat:      Pharynx: No oropharyngeal exudate.   Eyes:      General: No scleral icterus.        Right eye: No discharge.         Left eye: No discharge.      Conjunctiva/sclera: Conjunctivae normal.   Neck:      Thyroid: No thyromegaly.   Cardiovascular:      Rate and Rhythm: Normal rate and regular rhythm.      Heart sounds: Normal heart sounds. No murmur heard.  Pulmonary:      Effort: Pulmonary effort is normal.      Breath sounds: Normal breath sounds. No wheezing or rales.   Abdominal:      General: Bowel sounds are normal.      Palpations: Abdomen is soft.      Tenderness: There is no abdominal tenderness. There is no guarding or rebound.   Musculoskeletal:         General: No swelling. Normal range of motion.      Cervical back: Normal range of motion and neck supple.   Lymphadenopathy:      Cervical: No cervical adenopathy.      Upper Body:      Right upper body: No supraclavicular adenopathy.      Left upper body: No supraclavicular adenopathy.   Skin:     Findings: No erythema or rash.   Neurological:      Mental Status: He is alert and oriented to person, place, and time.      Cranial Nerves: No cranial nerve deficit.   Psychiatric:         Mood and Affect: Mood normal.         Behavior: Behavior normal.         MRI abd w w/out contrast " 2/14/2019  IMPRESSION:      Interval right hepatic lobe ablation.  No residual enhancement at this site.     No new concerning liver lesions identified, noting limited evaluation due to innumerable underlying cysts of varying sizes.  Continued CT PET imaging for future follow-up recommended.    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    CT imaging  - negative for evidence of distant disease.     PET/CT imaging studies - hepatic lesion  S/p liver bx 2/3/2017  - benign   status post LAR,12/27/2016.pT3 pN1cM0 Stage IIIB  He is s/p adjuvant XELOX cycle 4  Completed 4/17/2017  Followed by chemo/RT initiated  5/22/2017   s/p partial completion of XELODA approx 6/21/2017 ( due to ASE)   PET/CT 12/18/2018 reveals  Isolated right lobe liver metastasis more metabolic from the prior study more prominent worrisome for progression and poor response to therapy.  No new lesions are seen.   S/p ablation of liver lesion 12/31/2018   Follow-up MRI abd w w/out contrast 2/14/2019 reveals no areas of enhancement, no new liver lesions  Last colonoscopy 2020-2 benign polyps , suboptimal prep  Plan follow-up imaging studies urgent in setting of elevated CEA  Pt elects to schedule around his work schedule      Relevant Orders    NM PET CT Routine Skull to Mid Thigh    CT Chest Without Contrast    CBC Auto Differential    Comprehensive Metabolic Panel    CEA    Secondary malignant neoplasm   to liver     Other Visit Diagnoses       Elevated CEA        Relevant Orders    NM PET CT Routine Skull to Mid Thigh    CT Chest Without Contrast            Follow-up :         F/u  1 month with  CBC, CMP,CEA and  imaging studies prior to f/u in 1mo         I spent a total of  40  minutes on the day of the visit.This includes face  to face time and non-face to face time preparing to see the patient (eg, review of tests), obtaining and/or reviewing separately obtained history, documenting clinical information in the electronic or other health record, independently interpreting results and communicating results to the patient/family/caregiver, and coordinating care.      Cc: Cornelius Bragg MD

## 2022-11-08 NOTE — TELEPHONE ENCOUNTER
Called Patient to confirm if he got letter as requested.  No answer.  Left voice message on an unidentified recorder requesting a call back.    Quality 110: Preventive Care And Screening: Influenza Immunization: Influenza Immunization Administered during Influenza season Quality 130: Documentation Of Current Medications In The Medical Record: Current Medications Documented Detail Level: Detailed

## 2022-11-11 ENCOUNTER — TELEPHONE (OUTPATIENT)
Dept: HEMATOLOGY/ONCOLOGY | Facility: CLINIC | Age: 67
End: 2022-11-11
Payer: MEDICARE

## 2022-11-11 NOTE — TELEPHONE ENCOUNTER
Patient called to get information about his CT on Monday. Provided patient the information needed per Kourtney that he can only have his medicine and water that morning. Patient is requesting a go back to work note after his CT he will pick it up at the clinic.

## 2022-11-14 ENCOUNTER — HOSPITAL ENCOUNTER (OUTPATIENT)
Dept: RADIOLOGY | Facility: HOSPITAL | Age: 67
Discharge: HOME | End: 2022-11-14
Attending: INTERNAL MEDICINE
Payer: COMMERCIAL

## 2022-11-14 DIAGNOSIS — C78.7 METASTATIC COLON CANCER TO LIVER: ICD-10-CM

## 2022-11-14 DIAGNOSIS — C18.9 METASTATIC COLON CANCER TO LIVER: ICD-10-CM

## 2022-11-14 DIAGNOSIS — C78.7 SECONDARY MALIGNANT NEOPLASM OF LIVER: ICD-10-CM

## 2022-11-14 DIAGNOSIS — C20 RECTAL CARCINOMA: ICD-10-CM

## 2022-11-14 DIAGNOSIS — R97.0 ELEVATED CEA: ICD-10-CM

## 2022-11-14 DIAGNOSIS — G89.3 CANCER RELATED PAIN: ICD-10-CM

## 2022-11-14 PROCEDURE — 74177 CT ABD & PELVIS W/CONTRAST: CPT | Mod: TC

## 2022-11-14 PROCEDURE — 74177 CT ABD & PELVIS W/CONTRAST: CPT | Mod: 26,,, | Performed by: RADIOLOGY

## 2022-11-14 PROCEDURE — 71250 CT CHEST WITHOUT CONTRAST: ICD-10-PCS | Mod: 26,,, | Performed by: RADIOLOGY

## 2022-11-14 PROCEDURE — 25500020 PHARM REV CODE 255: Performed by: INTERNAL MEDICINE

## 2022-11-14 PROCEDURE — 71250 CT THORAX DX C-: CPT | Mod: 26,,, | Performed by: RADIOLOGY

## 2022-11-14 PROCEDURE — 74177 CT ABDOMEN PELVIS WITH CONTRAST: ICD-10-PCS | Mod: 26,,, | Performed by: RADIOLOGY

## 2022-11-14 PROCEDURE — 71250 CT THORAX DX C-: CPT | Mod: TC

## 2022-11-14 RX ADMIN — IOHEXOL 75 ML: 350 INJECTION, SOLUTION INTRAVENOUS at 07:11

## 2022-11-15 ENCOUNTER — TELEPHONE (OUTPATIENT)
Dept: HEMATOLOGY/ONCOLOGY | Facility: CLINIC | Age: 67
End: 2022-11-15
Payer: MEDICARE

## 2022-11-15 NOTE — TELEPHONE ENCOUNTER
Lm for pt to call office back to inform note was faxed and will be mailed after being scanned. [email or web address]

## 2022-11-15 NOTE — TELEPHONE ENCOUNTER
Patient came in to clinic to ask for a return to work letter from having recent ct. He had to take off of work and they require it to go back to work. Please advise if this is okay.

## 2022-11-28 ENCOUNTER — PATIENT OUTREACH (OUTPATIENT)
Dept: ADMINISTRATIVE | Facility: HOSPITAL | Age: 67
End: 2022-11-28
Payer: MEDICARE

## 2022-11-30 ENCOUNTER — LAB VISIT (OUTPATIENT)
Dept: LAB | Facility: HOSPITAL | Age: 67
End: 2022-11-30
Attending: INTERNAL MEDICINE
Payer: MEDICARE

## 2022-11-30 ENCOUNTER — OFFICE VISIT (OUTPATIENT)
Dept: HEMATOLOGY/ONCOLOGY | Facility: CLINIC | Age: 67
End: 2022-11-30
Payer: COMMERCIAL

## 2022-11-30 DIAGNOSIS — C20 RECTAL CARCINOMA: Primary | ICD-10-CM

## 2022-11-30 DIAGNOSIS — R97.0 ELEVATED CEA: ICD-10-CM

## 2022-11-30 DIAGNOSIS — C20 RECTAL CARCINOMA: ICD-10-CM

## 2022-11-30 DIAGNOSIS — C78.7 SECONDARY MALIGNANT NEOPLASM OF LIVER: ICD-10-CM

## 2022-11-30 LAB
ALBUMIN SERPL BCP-MCNC: 3 G/DL (ref 3.5–5.2)
ALP SERPL-CCNC: 121 U/L (ref 55–135)
ALT SERPL W/O P-5'-P-CCNC: 8 U/L (ref 10–44)
ANION GAP SERPL CALC-SCNC: 9 MMOL/L (ref 8–16)
AST SERPL-CCNC: 16 U/L (ref 10–40)
BASOPHILS # BLD AUTO: 0.03 K/UL (ref 0–0.2)
BASOPHILS NFR BLD: 0.4 % (ref 0–1.9)
BILIRUB SERPL-MCNC: 0.4 MG/DL (ref 0.1–1)
BUN SERPL-MCNC: 10 MG/DL (ref 8–23)
CALCIUM SERPL-MCNC: 9.2 MG/DL (ref 8.7–10.5)
CEA SERPL-MCNC: 20.3 NG/ML (ref 0–5)
CHLORIDE SERPL-SCNC: 105 MMOL/L (ref 95–110)
CO2 SERPL-SCNC: 24 MMOL/L (ref 23–29)
CREAT SERPL-MCNC: 0.8 MG/DL (ref 0.5–1.4)
DIFFERENTIAL METHOD: ABNORMAL
EOSINOPHIL # BLD AUTO: 0.1 K/UL (ref 0–0.5)
EOSINOPHIL NFR BLD: 2 % (ref 0–8)
ERYTHROCYTE [DISTWIDTH] IN BLOOD BY AUTOMATED COUNT: 14.4 % (ref 11.5–14.5)
EST. GFR  (NO RACE VARIABLE): >60 ML/MIN/1.73 M^2
GLUCOSE SERPL-MCNC: 112 MG/DL (ref 70–110)
HCT VFR BLD AUTO: 40.8 % (ref 40–54)
HGB BLD-MCNC: 13.2 G/DL (ref 14–18)
IMM GRANULOCYTES # BLD AUTO: 0.03 K/UL (ref 0–0.04)
IMM GRANULOCYTES NFR BLD AUTO: 0.4 % (ref 0–0.5)
LYMPHOCYTES # BLD AUTO: 1.2 K/UL (ref 1–4.8)
LYMPHOCYTES NFR BLD: 16.4 % (ref 18–48)
MCH RBC QN AUTO: 28.4 PG (ref 27–31)
MCHC RBC AUTO-ENTMCNC: 32.4 G/DL (ref 32–36)
MCV RBC AUTO: 88 FL (ref 82–98)
MONOCYTES # BLD AUTO: 0.8 K/UL (ref 0.3–1)
MONOCYTES NFR BLD: 11.1 % (ref 4–15)
NEUTROPHILS # BLD AUTO: 4.9 K/UL (ref 1.8–7.7)
NEUTROPHILS NFR BLD: 69.7 % (ref 38–73)
NRBC BLD-RTO: 0 /100 WBC
PLATELET # BLD AUTO: 277 K/UL (ref 150–450)
PMV BLD AUTO: 8.4 FL (ref 9.2–12.9)
POTASSIUM SERPL-SCNC: 3.9 MMOL/L (ref 3.5–5.1)
PROT SERPL-MCNC: 7.7 G/DL (ref 6–8.4)
RBC # BLD AUTO: 4.64 M/UL (ref 4.6–6.2)
SODIUM SERPL-SCNC: 138 MMOL/L (ref 136–145)
WBC # BLD AUTO: 7.01 K/UL (ref 3.9–12.7)

## 2022-11-30 PROCEDURE — 3044F PR MOST RECENT HEMOGLOBIN A1C LEVEL <7.0%: ICD-10-PCS | Mod: CPTII,95,, | Performed by: INTERNAL MEDICINE

## 2022-11-30 PROCEDURE — 99214 PR OFFICE/OUTPT VISIT, EST, LEVL IV, 30-39 MIN: ICD-10-PCS | Mod: 95,,, | Performed by: INTERNAL MEDICINE

## 2022-11-30 PROCEDURE — 82378 CARCINOEMBRYONIC ANTIGEN: CPT | Performed by: INTERNAL MEDICINE

## 2022-11-30 PROCEDURE — 85025 COMPLETE CBC W/AUTO DIFF WBC: CPT | Performed by: INTERNAL MEDICINE

## 2022-11-30 PROCEDURE — 36415 COLL VENOUS BLD VENIPUNCTURE: CPT | Performed by: INTERNAL MEDICINE

## 2022-11-30 PROCEDURE — 3044F HG A1C LEVEL LT 7.0%: CPT | Mod: CPTII,95,, | Performed by: INTERNAL MEDICINE

## 2022-11-30 PROCEDURE — 80053 COMPREHEN METABOLIC PANEL: CPT | Performed by: INTERNAL MEDICINE

## 2022-11-30 PROCEDURE — 99214 OFFICE O/P EST MOD 30 MIN: CPT | Mod: 95,,, | Performed by: INTERNAL MEDICINE

## 2022-11-30 NOTE — PROGRESS NOTES
Subjective:      Established Patient - Audio Only Telehealth Visit     The patient location is: home  The chief complaint leading to consultation is: f/u rectal ca  Visit type: Virtual visit with audio only (telephone)  Total time spent with patient: 20 min       The reason for the audio only service rather than synchronous audio and video virtual visit was related to technical difficulties or patient preference/necessity.     Each patient to whom I provide medical services by telemedicine is:  (1) informed of the relationship between the physician and patient and the respective role of any other health care provider with respect to management of the patient; and (2) notified that they may decline to receive medical services by telemedicine and may withdraw from such care at any time. Patient verbally consented to receive this service via voice-only telephone call).     .    Notes:         Patient ID: Tr Chen is a 67 y.o. male.    Chief Complaint: No chief complaint on file.    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 CT imaging studies negative for evidence of distant disease.  1/27/2017 PET/CT imaging  reveals   hepatic metastatic lesion . He subsequently underwent CT guided liver bx 2/3/2017 - NEG for malignancy .    status post LAR,12/27/2016.pT3 pN1cM0 Stage IIIB  He is s/p adjuvant XELOX cycle 4  Completed 4/17/2017  Followed by chemo/RT initiated  5/22/2017   He completed XRT July 6, 2017  s/p partial completion of XELODA approx 6/21/2017 ( due  to ASE)    PET scan on 8/16/2018 showed continued growth of this lesion to 3.8cm. LIver lesion was biopsied again on 10/18/2018. Pathology revealed adenocarcinoma compatible with a metastatic carcinoma of colorectal origin.    Pt delayed follow up imaging ( work related issues)   He works for the [employer] and works for weeks at at time sometims  Recent PET/CT 12/18/2018 reveals  Isolated right lobe liver metastasis more metabolic from the prior study more prominent worrisome for progression and poor response to therapy.  No new lesions are seen.   Pt followed by IR   s/p ablation of liver lesion 12/31/2018   MRI abd w w/o contrast 2/14/2019 reveals interval right hepatic lobe ablation with no residual enhancement at site,no new concerning liver lesions noed     He continues to work for [id number] full time  He needs his workup coordinated with his work schedule   He continues to  work full-time   Appetite and weight stable   No shortness of breath or chest pain  No abd pain   No N/V   No melena, hematochezia   He continues to smoke   No cough /SOB     Surveillance colonoscopy 12/27/2017- NEG        Prev Hx: Pt  with past medical history of colonic polyps, hypertension, tobacco abuse, seen today in consultation for  diagnosed rectosigmoid adenocarcinoma.  The patient reports intermittent rectal bleeding for the past 3 to 4 months.  He subsequently underwent a colonoscopy on 11/21/2016, which revealed polyps noted in the descending colon, sigmoid colon, and rectum and also noted for a malignant-appearing partially obstructing tumor in the distal sigmoid colon with ulceration. Pathology of the rectosigmoid mass revealed a tubulovillous adenoma and polyps benign..The patient reports he underwent a colonoscopy during his 40s for rectal bleeding.  He was diagnosed with a large bleeding polyp, for which he underwent removal.  He did not undergo surveillance colonoscopies as advised.  No family history of colon cancer.  CT of  the abdomen and pelvis on 11/22/2016 revealed thickening of the rectal wall, findings, rectosigmoid neoplasm at the rectosigmoid junction and numerous hepatic lesions compatible with hepatic cysts. He  subsequently underwent a rectal sigmoidectomy on 12/27/2016 .  Pathology revealed an adenocarcinoma, low grade, clear margins. The patient underwent removal of 24 lymph nodes with 0 lymph nodes involved.  Evidence of tumor deposits noted with pathologic staging pT3, pN1c.            Review of Systems   Constitutional:  Negative for appetite change, fatigue, fever and unexpected weight change.   HENT:  Negative for mouth sores.    Eyes:  Negative for visual disturbance.   Respiratory:  Negative for cough and shortness of breath.    Cardiovascular:  Negative for chest pain.   Gastrointestinal:  Negative for abdominal pain and diarrhea.   Genitourinary:  Negative for frequency.   Musculoskeletal:  Negative for back pain.   Integumentary:  Negative for rash.   Neurological:  Negative for headaches.   Hematological:  Negative for adenopathy.   Psychiatric/Behavioral:  The patient is not nervous/anxious.        Objective:       There were no vitals filed for this visit.            MRI abd w w/out contrast 2/14/2019  IMPRESSION:      Interval right hepatic lobe ablation.  No residual enhancement at this site.     No new concerning liver lesions identified, noting limited evaluation due to innumerable underlying cysts of varying sizes.  Continued CT PET imaging for future follow-up recommended.    CT c/a/p w/contrast  11/14/22   Impression:     Index left lung metastasis 8.8 x 6.7 cm.     Index liver metastasis right lobe measuring 8 x 6.4 cm.     Index right pelvic mass obstructing the right ureter 3 cm.     Index thickening right rectosigmoid junction 1.5 cm.     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    CT imaging  - negative for evidence of distant disease.     PET/CT imaging studies - hepatic lesion  S/p liver bx 2/3/2017  - benign   status post LAR,12/27/2016.pT3 pN1cM0 Stage IIIB  He is s/p adjuvant XELOX cycle 4  Completed 4/17/2017  Followed by chemo/RT initiated  5/22/2017   s/p partial completion of XELODA approx 6/21/2017 ( due to ASE)   PET/CT 12/18/2018 reveals  Isolated right lobe liver metastasis more metabolic from the prior study more prominent worrisome for progression and poor response to therapy.  No new lesions are seen.   S/p ablation of liver lesion 12/31/2018   Follow-up MRI abd w w/out contrast 2/14/2019 reveals no areas of enhancement, no new liver lesions  Last colonoscopy 2020-2 benign polyps , suboptimal prep  Plan follow-up imaging studies urgent in setting of elevated CEA  Pt elects to schedule around his work schedule  CT chest imaging reviewed w/pt  Plan follow-up PET imaging and plan IR guided tissue biopsy pending CT findings    Relevant Orders    NM PET CT Routine Skull to Mid Thigh    CT Chest Without Contrast    CBC Auto Differential    Comprehensive Metabolic Panel    CEA    Secondary malignant neoplasm   to liver     Other Visit Diagnoses       Elevated CEA        Relevant Orders    NM PET CT Routine Skull to Mid Thigh                Follow-up :         F/u  1 month with  CBC, CMP,CEA and  imaging studies prior to f/u in 1mo         I spent a total of  20 minutes on the day of the visit.This includes face to face time and non-face to face time preparing to see the patient (eg, review of tests), obtaining and/or reviewing separately obtained history, documenting clinical information in the electronic or other health record, independently interpreting results and communicating results to the patient/family/caregiver, and coordinating care.       Cc: Cornelius Bragg MD            The reason for the audio only service rather than synchronous audio and video virtual visit was related to technical difficulties or patient preference/necessity.     Each patient to whom I provide medical services by telemedicine is:  (1) informed of the relationship between the physician and patient and the respective role of any other health care provider with respect to management of the patient; and (2) notified that they may decline to receive medical services by telemedicine and may withdraw from such care at any time. Patient verbally consented to receive this service via voice-only telephone call.            This service was not originating from a related E/M service provided within the previous 7 days nor will  to an E/M service or procedure within the next 24 hours or my soonest available appointment.  Prevailing standard of care was able to be met in this audio-only visit.

## 2022-12-01 ENCOUNTER — HOSPITAL ENCOUNTER (OUTPATIENT)
Dept: RADIOLOGY | Facility: HOSPITAL | Age: 67
Discharge: HOME | End: 2022-12-01
Attending: INTERNAL MEDICINE
Payer: COMMERCIAL

## 2022-12-01 DIAGNOSIS — C78.7 SECONDARY MALIGNANT NEOPLASM OF LIVER: ICD-10-CM

## 2022-12-01 DIAGNOSIS — R97.0 ELEVATED CEA: ICD-10-CM

## 2022-12-01 DIAGNOSIS — C20 RECTAL CARCINOMA: ICD-10-CM

## 2022-12-01 PROCEDURE — A9698 NON-RAD CONTRAST MATERIALNOC: HCPCS | Performed by: INTERNAL MEDICINE

## 2022-12-01 PROCEDURE — 25500020 PHARM REV CODE 255: Performed by: INTERNAL MEDICINE

## 2022-12-01 PROCEDURE — 78815 NM PET CT ROUTINE: ICD-10-PCS | Mod: 26,PS,, | Performed by: RADIOLOGY

## 2022-12-01 PROCEDURE — 78815 PET IMAGE W/CT SKULL-THIGH: CPT | Mod: TC

## 2022-12-01 PROCEDURE — 78815 PET IMAGE W/CT SKULL-THIGH: CPT | Mod: 26,PS,, | Performed by: RADIOLOGY

## 2022-12-01 RX ADMIN — BARIUM SULFATE 900 ML: 20 SUSPENSION ORAL at 11:12

## 2022-12-02 LAB — POCT GLUCOSE: 98 MG/DL (ref 70–110)

## 2022-12-19 ENCOUNTER — TELEPHONE (OUTPATIENT)
Dept: HEMATOLOGY/ONCOLOGY | Facility: CLINIC | Age: 67
End: 2022-12-19
Payer: MEDICARE

## 2022-12-19 DIAGNOSIS — R94.8 ABNORMAL POSITRON EMISSION TOMOGRAPHY (PET) SCAN: ICD-10-CM

## 2022-12-19 DIAGNOSIS — C20 RECTAL CARCINOMA: Primary | ICD-10-CM

## 2022-12-19 DIAGNOSIS — R97.0 ELEVATED CEA: ICD-10-CM

## 2022-12-20 ENCOUNTER — TELEPHONE (OUTPATIENT)
Dept: HEMATOLOGY/ONCOLOGY | Facility: CLINIC | Age: 67
End: 2022-12-20
Payer: MEDICARE

## 2022-12-21 ENCOUNTER — TELEPHONE (OUTPATIENT)
Dept: INTERVENTIONAL RADIOLOGY/VASCULAR | Facility: HOSPITAL | Age: 67
End: 2022-12-21
Payer: MEDICARE

## 2022-12-21 NOTE — TELEPHONE ENCOUNTER
Attempted to call patient to schedule IR procedure. Unable to reach patient, a voicemail was left with our contact information ([phone]).

## 2023-01-03 ENCOUNTER — TELEPHONE (OUTPATIENT)
Dept: HEMATOLOGY/ONCOLOGY | Facility: CLINIC | Age: 68
End: 2023-01-03
Payer: MEDICARE

## 2023-01-03 NOTE — TELEPHONE ENCOUNTER
Can you rescheduled the things he missed for next month including his biopsy w. IR and advise him of whne they are scheduled   This is per his request due to him retiring [employer]

## 2023-01-03 NOTE — TELEPHONE ENCOUNTER
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 Informed patient that I will send a message to the staff about his request and if we need more information from him we will contact him. Patient verbalized understanding.

## 2023-01-04 ENCOUNTER — TELEPHONE (OUTPATIENT)
Dept: HEMATOLOGY/ONCOLOGY | Facility: CLINIC | Age: 68
End: 2023-01-04
Payer: MEDICARE

## 2023-01-04 NOTE — TELEPHONE ENCOUNTER
----- Message from Kourtney Bhardwaj MA sent at 1/3/2023  3:50 PM CST -----  SOBEIDA Middleton MA  Caller: Unspecified (Today, 11:29 AM)  Can you rescheduled the things he missed for next month including his biopsy w. IR and advise him of whne they are scheduled   This is per his request due to him retiring [employer]

## 2023-01-04 NOTE — TELEPHONE ENCOUNTER
Spoke with amos in ir, he will contact patient to schedule for after he retires on February 11th.

## 2023-01-18 ENCOUNTER — TELEPHONE (OUTPATIENT)
Dept: FAMILY MEDICINE | Facility: CLINIC | Age: 68
End: 2023-01-18
Payer: MEDICARE

## 2023-01-19 ENCOUNTER — LAB VISIT (OUTPATIENT)
Dept: LAB | Facility: HOSPITAL | Age: 68
End: 2023-01-19
Attending: INTERNAL MEDICINE
Payer: MEDICARE

## 2023-01-19 ENCOUNTER — OFFICE VISIT (OUTPATIENT)
Dept: FAMILY MEDICINE | Facility: CLINIC | Age: 68
End: 2023-01-19
Payer: COMMERCIAL

## 2023-01-19 VITALS
SYSTOLIC BLOOD PRESSURE: 128 MMHG | BODY MASS INDEX: 20.23 KG/M2 | TEMPERATURE: 98 F | DIASTOLIC BLOOD PRESSURE: 70 MMHG | HEIGHT: 75 IN | OXYGEN SATURATION: 98 % | WEIGHT: 162.69 LBS | HEART RATE: 89 BPM

## 2023-01-19 DIAGNOSIS — C78.7 METASTATIC COLON CANCER TO LIVER: ICD-10-CM

## 2023-01-19 DIAGNOSIS — I10 ESSENTIAL HYPERTENSION: ICD-10-CM

## 2023-01-19 DIAGNOSIS — C18.9 METASTATIC COLON CANCER TO LIVER: ICD-10-CM

## 2023-01-19 DIAGNOSIS — F17.200 TOBACCO USE DISORDER: ICD-10-CM

## 2023-01-19 DIAGNOSIS — N52.35 ERECTILE DYSFUNCTION FOLLOWING RADIATION THERAPY: ICD-10-CM

## 2023-01-19 DIAGNOSIS — N13.30 HYDRONEPHROSIS, UNSPECIFIED HYDRONEPHROSIS TYPE: ICD-10-CM

## 2023-01-19 DIAGNOSIS — F11.20 OPIATE DEPENDENCE, CONTINUOUS: ICD-10-CM

## 2023-01-19 DIAGNOSIS — G89.29 OTHER CHRONIC PAIN: Primary | ICD-10-CM

## 2023-01-19 DIAGNOSIS — K76.9 LIVER LESION: ICD-10-CM

## 2023-01-19 DIAGNOSIS — G89.3 CANCER RELATED PAIN: ICD-10-CM

## 2023-01-19 DIAGNOSIS — C78.00 MALIGNANT NEOPLASM METASTATIC TO LUNG, UNSPECIFIED LATERALITY: ICD-10-CM

## 2023-01-19 LAB
ALBUMIN SERPL BCP-MCNC: 3.1 G/DL (ref 3.5–5.2)
ALP SERPL-CCNC: 113 U/L (ref 55–135)
ALT SERPL W/O P-5'-P-CCNC: 8 U/L (ref 10–44)
ANION GAP SERPL CALC-SCNC: 11 MMOL/L (ref 8–16)
AST SERPL-CCNC: 14 U/L (ref 10–40)
BASOPHILS # BLD AUTO: 0.06 K/UL (ref 0–0.2)
BASOPHILS NFR BLD: 0.7 % (ref 0–1.9)
BILIRUB SERPL-MCNC: 0.4 MG/DL (ref 0.1–1)
BUN SERPL-MCNC: 9 MG/DL (ref 8–23)
CALCIUM SERPL-MCNC: 9.5 MG/DL (ref 8.7–10.5)
CHLORIDE SERPL-SCNC: 104 MMOL/L (ref 95–110)
CO2 SERPL-SCNC: 22 MMOL/L (ref 23–29)
CREAT SERPL-MCNC: 0.8 MG/DL (ref 0.5–1.4)
DIFFERENTIAL METHOD: ABNORMAL
EOSINOPHIL # BLD AUTO: 0.1 K/UL (ref 0–0.5)
EOSINOPHIL NFR BLD: 1.7 % (ref 0–8)
ERYTHROCYTE [DISTWIDTH] IN BLOOD BY AUTOMATED COUNT: 15.2 % (ref 11.5–14.5)
EST. GFR  (NO RACE VARIABLE): >60 ML/MIN/1.73 M^2
GLUCOSE SERPL-MCNC: 96 MG/DL (ref 70–110)
HCT VFR BLD AUTO: 40.3 % (ref 40–54)
HGB BLD-MCNC: 12.2 G/DL (ref 14–18)
IMM GRANULOCYTES # BLD AUTO: 0.03 K/UL (ref 0–0.04)
IMM GRANULOCYTES NFR BLD AUTO: 0.4 % (ref 0–0.5)
LYMPHOCYTES # BLD AUTO: 1.2 K/UL (ref 1–4.8)
LYMPHOCYTES NFR BLD: 14.7 % (ref 18–48)
MCH RBC QN AUTO: 27.7 PG (ref 27–31)
MCHC RBC AUTO-ENTMCNC: 30.3 G/DL (ref 32–36)
MCV RBC AUTO: 92 FL (ref 82–98)
MONOCYTES # BLD AUTO: 0.8 K/UL (ref 0.3–1)
MONOCYTES NFR BLD: 9.7 % (ref 4–15)
NEUTROPHILS # BLD AUTO: 5.9 K/UL (ref 1.8–7.7)
NEUTROPHILS NFR BLD: 72.8 % (ref 38–73)
NRBC BLD-RTO: 0 /100 WBC
PLATELET # BLD AUTO: 389 K/UL (ref 150–450)
PMV BLD AUTO: 9.4 FL (ref 9.2–12.9)
POTASSIUM SERPL-SCNC: 3.9 MMOL/L (ref 3.5–5.1)
PROT SERPL-MCNC: 7.7 G/DL (ref 6–8.4)
RBC # BLD AUTO: 4.4 M/UL (ref 4.6–6.2)
SODIUM SERPL-SCNC: 137 MMOL/L (ref 136–145)
WBC # BLD AUTO: 8.16 K/UL (ref 3.9–12.7)

## 2023-01-19 PROCEDURE — 3078F PR MOST RECENT DIASTOLIC BLOOD PRESSURE < 80 MM HG: ICD-10-PCS | Mod: CPTII,S$GLB,, | Performed by: INTERNAL MEDICINE

## 2023-01-19 PROCEDURE — 36415 COLL VENOUS BLD VENIPUNCTURE: CPT | Mod: PO | Performed by: INTERNAL MEDICINE

## 2023-01-19 PROCEDURE — 3074F SYST BP LT 130 MM HG: CPT | Mod: CPTII,S$GLB,, | Performed by: INTERNAL MEDICINE

## 2023-01-19 PROCEDURE — 85025 COMPLETE CBC W/AUTO DIFF WBC: CPT | Performed by: INTERNAL MEDICINE

## 2023-01-19 PROCEDURE — 1126F PR PAIN SEVERITY QUANTIFIED, NO PAIN PRESENT: ICD-10-PCS | Mod: CPTII,S$GLB,, | Performed by: INTERNAL MEDICINE

## 2023-01-19 PROCEDURE — 99215 OFFICE O/P EST HI 40 MIN: CPT | Mod: S$GLB,,, | Performed by: INTERNAL MEDICINE

## 2023-01-19 PROCEDURE — 3288F PR FALLS RISK ASSESSMENT DOCUMENTED: ICD-10-PCS | Mod: CPTII,S$GLB,, | Performed by: INTERNAL MEDICINE

## 2023-01-19 PROCEDURE — 99215 PR OFFICE/OUTPT VISIT, EST, LEVL V, 40-54 MIN: ICD-10-PCS | Mod: S$GLB,,, | Performed by: INTERNAL MEDICINE

## 2023-01-19 PROCEDURE — 3008F BODY MASS INDEX DOCD: CPT | Mod: CPTII,S$GLB,, | Performed by: INTERNAL MEDICINE

## 2023-01-19 PROCEDURE — 1159F PR MEDICATION LIST DOCUMENTED IN MEDICAL RECORD: ICD-10-PCS | Mod: CPTII,S$GLB,, | Performed by: INTERNAL MEDICINE

## 2023-01-19 PROCEDURE — 3074F PR MOST RECENT SYSTOLIC BLOOD PRESSURE < 130 MM HG: ICD-10-PCS | Mod: CPTII,S$GLB,, | Performed by: INTERNAL MEDICINE

## 2023-01-19 PROCEDURE — 1101F PR PT FALLS ASSESS DOC 0-1 FALLS W/OUT INJ PAST YR: ICD-10-PCS | Mod: CPTII,S$GLB,, | Performed by: INTERNAL MEDICINE

## 2023-01-19 PROCEDURE — 3078F DIAST BP <80 MM HG: CPT | Mod: CPTII,S$GLB,, | Performed by: INTERNAL MEDICINE

## 2023-01-19 PROCEDURE — 3288F FALL RISK ASSESSMENT DOCD: CPT | Mod: CPTII,S$GLB,, | Performed by: INTERNAL MEDICINE

## 2023-01-19 PROCEDURE — 80053 COMPREHEN METABOLIC PANEL: CPT | Performed by: INTERNAL MEDICINE

## 2023-01-19 PROCEDURE — 1159F MED LIST DOCD IN RCRD: CPT | Mod: CPTII,S$GLB,, | Performed by: INTERNAL MEDICINE

## 2023-01-19 PROCEDURE — 3008F PR BODY MASS INDEX (BMI) DOCUMENTED: ICD-10-PCS | Mod: CPTII,S$GLB,, | Performed by: INTERNAL MEDICINE

## 2023-01-19 PROCEDURE — 1126F AMNT PAIN NOTED NONE PRSNT: CPT | Mod: CPTII,S$GLB,, | Performed by: INTERNAL MEDICINE

## 2023-01-19 PROCEDURE — 1101F PT FALLS ASSESS-DOCD LE1/YR: CPT | Mod: CPTII,S$GLB,, | Performed by: INTERNAL MEDICINE

## 2023-01-19 PROCEDURE — 99999 PR PBB SHADOW E&M-EST. PATIENT-LVL III: CPT | Mod: PBBFAC,,, | Performed by: INTERNAL MEDICINE

## 2023-01-19 PROCEDURE — 99999 PR PBB SHADOW E&M-EST. PATIENT-LVL III: ICD-10-PCS | Mod: PBBFAC,,, | Performed by: INTERNAL MEDICINE

## 2023-01-19 RX ORDER — LACTULOSE 10 G/15ML
SOLUTION ORAL; RECTAL
COMMUNITY
Start: 2022-09-26

## 2023-01-19 RX ORDER — HYDROCODONE BITARTRATE AND ACETAMINOPHEN 5; 325 MG/1; MG/1
1 TABLET ORAL EVERY 6 HOURS PRN
Qty: 120 TABLET | Refills: 0 | Status: SHIPPED | OUTPATIENT
Start: 2023-03-18 | End: 2023-04-19 | Stop reason: SDUPTHER

## 2023-01-19 RX ORDER — PAPAVERINE HYDROCHLORIDE 30 MG/ML
INJECTION INTRAMUSCULAR; INTRAVENOUS
Qty: 10 ML | Refills: 0 | Status: SHIPPED | OUTPATIENT
Start: 2023-01-19

## 2023-01-19 RX ORDER — HYDROCODONE BITARTRATE AND ACETAMINOPHEN 5; 325 MG/1; MG/1
1 TABLET ORAL EVERY 6 HOURS PRN
Qty: 120 TABLET | Refills: 0 | Status: SHIPPED | OUTPATIENT
Start: 2023-01-19 | End: 2023-05-15 | Stop reason: SDUPTHER

## 2023-01-19 RX ORDER — HYDROCODONE BITARTRATE AND ACETAMINOPHEN 5; 325 MG/1; MG/1
1 TABLET ORAL EVERY 6 HOURS PRN
Qty: 120 TABLET | Refills: 0 | Status: SHIPPED | OUTPATIENT
Start: 2023-02-18 | End: 2023-05-11

## 2023-01-19 NOTE — PROGRESS NOTES
Chief complaint follow-up on pain medications and discussed follow-up on cancer    67-year-old white male .  Discussed at length the interval diagnosis of metastatic cancer.  Discussed that it is highly likely this is colon cancer based on the findings in the colon and Oncology apparently wants to do a liver biopsy.  We discussed that it is probably indicated in order to direct treatment and make sure that the liver metastases is not a primary liver cancer or metastatic lung cancer.  We discussed that treatment can be based upon biopsy results now.  I did discuss with him that any treatment would not be curable at this point but to extend the time of his life.  He does have the biopsy set for early February and he is willing to follow through on that as I am in agreement with that plan.  He is losing weight.  We did discuss that he had hydronephrosis on one side and it has been about a month and a half so we need to reassess his kidney function today.  No signs of overload or any urinary symptoms.  We discussed he might need to see Urology if there is obstruction and loss of kidney function.  He is not too interested in that obviously.  He was looking forward to jail we discussed that obviously he needs to get this treated or least establish a plan of action.  We discussed increasing calorie intake and explained him that his weight loss likely has been increased catabolism fighting off the cancer.  Counseled at length.  Chronic pains been about the same and will fill his prescriptions.      Interested in retry the injections for erectile dysfunction which did work well but at the time and with the pandemic there was no immediate need for him to perform sexually.  He would like to try it one more time before he dies.  Under the circumstances I looked up his prior prescription written by Urology and [place].  He has already been instructed on  proper use and so forth.      Polyps 12/2020 -Repeat colonoscopy within 6 months because the                        bowel preparation was suboptimal.     Blood pressure appears to be running good.  We did discuss is previous colon cancer reviewed the last oncology note .  He was due probably June of 2021 for his follow-up colonoscopy but canceled due to pandemic.  He has had work and pandemic related issues getting that set up but he does plan to try and do it this year.  I sent a message to Oncology to make specific recommendations about what follow-up testing might be indicated which appears it could include a PET scan and/or MRI of the liver.  He will definitely try to get those tests done if ordered.    Messaged Oncology who rec start with CT but ins denied as below  The initial review of your request for CT CHEST ABDOMEN PELVIS WITHOUT CONTRAST(XPD) 64436 and - MO CT SCAN,ABDOMENT AND PELVIS,W/O CONTRAST   10665 has been completed by a physician reviewer and the clinical information received does not support the requirements of medical necessity guidelines for this procedure. The auth is in the process of being DENIED by a physician reviewer.   Unclear as to why not approved by insurance for perhaps because of the duration since his prior CT being a couple of years and several years since seeing Oncology.  Explained that to him.  We will refer him back call to get re-established and have appropriate testing done.  Be due for his PSA in August so will add that as I am sure oncology will order some future lab work including CEA level which looks like it is phone two years ago or so.  Onc phone note--NP appt from referral   Pt had hx of colon ca "years back"   Stated he was told by PCP that he needed follow up as he has not had an check up  in years   Numerous appointment dates & times offered to pt he declined them all Stated he works for [employer] and it has to fit in his schedule He said it is very difficult  to get off    He advised nurse that he will call make to make appointment when he can fit it in   Previous labs reviewed in all unremarkable including PSA.      CT c/a/p w/contrast  11/14/22   Impression:     Index left lung metastasis 8.8 x 6.7 cm.     Index liver metastasis right lobe measuring 8 x 6.4 cm.     Index right pelvic mass obstructing the right ureter 3 cm.     Index thickening right rectosigmoid junction 1.5 cm.     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     There are additional numerous hypermetabolic lesions throughout the liver and bilateral lungs worrisome for malignancy.     Incidental hypermetabolic focus in the left side of the prostate gland.  Differential considerations include focal prostatitis versus malignancy.     Similar to comparison CT abdomen pelvis 11/14/2022, the para-rectal right pelvic mass obstructs the right ureter resulting in grossly stable upstream hydroureteronephrosis          Extensive time counseling, doing controlled substance management and physician himself road several work notes and summary letters. Over 70 min  minutes of total time spent on the encounter, which includes face to face time and non-face to face time preparing to see the patient (eg, review of tests), Obtaining and/or reviewing separately obtained history, Documenting clinical information in the electronic or other health record, Independently interpreting results (not separately reported) and communicating results to the patient/family/caregiver, or Care coordination (not separately reported).     ROS:   CONST: weight stable. EYES: no vision change. ENT: no sore throat. CV: no chest pain w/ exertion. RESP: no shortness of breath. GI: no  nausea, vomiting, diarrhea. No dysphagia. : no urinary issues. MUSCULOSKELETAL: no new myalgias or arthralgias. SKIN: no new changes. NEURO: no focal deficits. PSYCH: no new issues. ENDOCRINE: no polyuria. HEME: no lymph nodes. ALLERGY: no general pruritis.    PAST MEDICAL HISTORY:   1. ED.   2. HTN.   3. Smoker.   4. Allergic rhinitis with exposure to mold   5. Frequent bronchitis   6. Lumbar strain due to work on [employer]   7. Large colon polyp removed before age 50 - Colon  w polyps and fair prep -rec 6 mo repeat  8.  Chronic pain referable to ankle arthritis   9.  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 One younger brother with no medical problems       Gen: no distress  Exam otherwise deferred    Diagnoses and all orders for this visit:    Other chronic pain, high risk medication with high risk of complications and side effects address today.  Use is appropriate and allows him to function    Metastatic colon cancer to liver, long discussion, agree with biopsy to direct management, hopefully there could be some oral palliative therapy    Cancer related pain    Essential hypertension, chronic and stable    Opiate dependence, continuous    Tobacco use disorder    Liver lesion    Malignant neoplasm metastatic to lung, unspecified laterality    Hydronephrosis, unspecified hydronephrosis type, reassess kidney function might need to see Urology, the extended intervention and so forth might be based upon his overall plan for his cancer treatment  -     CBC Auto Differential; Future  -     Comprehensive Metabolic Panel; Future    Erectile dysfunction following radiation therapy  -     papaverine 30 mg/mL injection; Add: Phentolamine 1 mg Add: PGE1 10 mcg Sig:  Inject 20 units as directed; increase up 5  units until desired results.    Other orders  -     HYDROcodone-acetaminophen (NORCO) 5-325 mg per tablet; Take 1 tablet by mouth every 6 (six) hours as needed for Pain (Chronic pain, G89.4). :MORE THAN A SEVEN DAY SUPPLY OF OPIOID IS MEDICALLY NECESSARY.  -     HYDROcodone-acetaminophen (NORCO) 5-325 mg per tablet; Take 1 tablet by mouth every 6 (six) hours as needed for Pain (Chronic pain, G89.4). :MORE THAN A SEVEN DAY SUPPLY OF OPIOID IS MEDICALLY NECESSARY.  -     HYDROcodone-acetaminophen (NORCO) 5-325 mg per tablet; Take 1 tablet by mouth every 6 (six) hours as needed for Pain (Chronic pain, G89.4). :MORE THAN A SEVEN DAY SUPPLY OF OPIOID IS MEDICALLY NECESSARY.

## 2023-02-02 ENCOUNTER — TELEPHONE (OUTPATIENT)
Dept: HEMATOLOGY/ONCOLOGY | Facility: CLINIC | Age: 68
End: 2023-02-02
Payer: MEDICARE

## 2023-02-06 DIAGNOSIS — C20 RECTAL CARCINOMA: Primary | ICD-10-CM

## 2023-02-06 DIAGNOSIS — R97.0 ELEVATED CEA: ICD-10-CM

## 2023-02-13 ENCOUNTER — HOSPITAL ENCOUNTER (OUTPATIENT)
Dept: PREADMISSION TESTING | Facility: HOSPITAL | Age: 68
Discharge: HOME | End: 2023-02-13
Attending: INTERNAL MEDICINE
Payer: MEDICARE

## 2023-02-13 VITALS
RESPIRATION RATE: 16 BRPM | TEMPERATURE: 97 F | OXYGEN SATURATION: 100 % | SYSTOLIC BLOOD PRESSURE: 137 MMHG | HEART RATE: 90 BPM | BODY MASS INDEX: 20.45 KG/M2 | HEIGHT: 75 IN | DIASTOLIC BLOOD PRESSURE: 6 MMHG | WEIGHT: 164.44 LBS

## 2023-02-13 DIAGNOSIS — Z01.818 PREOPERATIVE TESTING: Primary | ICD-10-CM

## 2023-02-13 LAB
ALBUMIN SERPL BCP-MCNC: 3.1 G/DL (ref 3.5–5.2)
ALP SERPL-CCNC: 134 U/L (ref 55–135)
ALT SERPL W/O P-5'-P-CCNC: 9 U/L (ref 10–44)
ANION GAP SERPL CALC-SCNC: 12 MMOL/L (ref 8–16)
AST SERPL-CCNC: 16 U/L (ref 10–40)
BASOPHILS # BLD AUTO: 0.05 K/UL (ref 0–0.2)
BASOPHILS NFR BLD: 0.6 % (ref 0–1.9)
BILIRUB SERPL-MCNC: 0.4 MG/DL (ref 0.1–1)
BUN SERPL-MCNC: 8 MG/DL (ref 8–23)
CALCIUM SERPL-MCNC: 9 MG/DL (ref 8.7–10.5)
CHLORIDE SERPL-SCNC: 102 MMOL/L (ref 95–110)
CO2 SERPL-SCNC: 24 MMOL/L (ref 23–29)
CREAT SERPL-MCNC: 0.9 MG/DL (ref 0.5–1.4)
DIFFERENTIAL METHOD: ABNORMAL
EOSINOPHIL # BLD AUTO: 0.1 K/UL (ref 0–0.5)
EOSINOPHIL NFR BLD: 1.5 % (ref 0–8)
ERYTHROCYTE [DISTWIDTH] IN BLOOD BY AUTOMATED COUNT: 14.7 % (ref 11.5–14.5)
EST. GFR  (NO RACE VARIABLE): >60 ML/MIN/1.73 M^2
GLUCOSE SERPL-MCNC: 87 MG/DL (ref 70–110)
HCT VFR BLD AUTO: 38 % (ref 40–54)
HGB BLD-MCNC: 12 G/DL (ref 14–18)
IMM GRANULOCYTES # BLD AUTO: 0.03 K/UL (ref 0–0.04)
IMM GRANULOCYTES NFR BLD AUTO: 0.3 % (ref 0–0.5)
INR PPP: 1.1 (ref 0.8–1.2)
LYMPHOCYTES # BLD AUTO: 1.3 K/UL (ref 1–4.8)
LYMPHOCYTES NFR BLD: 14.7 % (ref 18–48)
MCH RBC QN AUTO: 27.4 PG (ref 27–31)
MCHC RBC AUTO-ENTMCNC: 31.6 G/DL (ref 32–36)
MCV RBC AUTO: 87 FL (ref 82–98)
MONOCYTES # BLD AUTO: 0.9 K/UL (ref 0.3–1)
MONOCYTES NFR BLD: 10.4 % (ref 4–15)
NEUTROPHILS # BLD AUTO: 6.3 K/UL (ref 1.8–7.7)
NEUTROPHILS NFR BLD: 72.5 % (ref 38–73)
NRBC BLD-RTO: 0 /100 WBC
PLATELET # BLD AUTO: 376 K/UL (ref 150–450)
PMV BLD AUTO: 8.9 FL (ref 9.2–12.9)
POTASSIUM SERPL-SCNC: 4.4 MMOL/L (ref 3.5–5.1)
PROT SERPL-MCNC: 7.8 G/DL (ref 6–8.4)
PROTHROMBIN TIME: 11.3 SEC (ref 9–12.5)
RBC # BLD AUTO: 4.38 M/UL (ref 4.6–6.2)
SODIUM SERPL-SCNC: 138 MMOL/L (ref 136–145)
WBC # BLD AUTO: 8.65 K/UL (ref 3.9–12.7)

## 2023-02-13 PROCEDURE — 85610 PROTHROMBIN TIME: CPT | Performed by: RADIOLOGY

## 2023-02-13 PROCEDURE — 80053 COMPREHEN METABOLIC PANEL: CPT | Performed by: RADIOLOGY

## 2023-02-13 PROCEDURE — 36415 COLL VENOUS BLD VENIPUNCTURE: CPT | Performed by: RADIOLOGY

## 2023-02-13 PROCEDURE — 85025 COMPLETE CBC W/AUTO DIFF WBC: CPT | Performed by: RADIOLOGY

## 2023-02-13 NOTE — DISCHARGE INSTRUCTIONS
Before 7 AM, enter through the Emergency Entrance..   After 7 AM enter through the Main Entrance.      Your procedure  is scheduled for ___2/14/2023_______.   Arrive in Same Day Surgery at ____7:00 am________    You may have one visitor.  No children.    You will be going to the Same Day Surgery Unit on the 2nd floor of the hospital.    Important instructions:  Do not eat anything after midnight.         SEE MEDICATION SHEET.   TAKE MEDICATIONS AS DIRECTED WITH SIPS OF WATER.    STOP taking Aspirin, Ibuprofen,  Advil, Motrin, Mobic(meloxicam), Aleve (naproxen), Fish oil, and Vitamin E for at least 7 days before your surgery.     You may take Tylenol if needed which is not a blood thinner.    Please shower the night before and the morning of your surgery.      No shaving of procedural area at least 4-5 days before surgery due to increased risk of skin irritation and/or possible infection.    Contact lenses and removable denture work may not be worn during your procedure.    You may wear deodorant only. If you are having breast surgery, do not wear deodorant on the operative side.    Do not wear powder, body lotion, perfume/cologne or make-up.    Do not wear any jewelry or have any metal on your body.    You will be asked to remove any dentures or partials for the procedure.    If you are going home on the same day of surgery, you must arrange for a family member or a friend to drive you home.  Public transportation is prohibited.  You will not be able to drive home if you were given anesthesia or sedation.    Patients who want to have their Post-op prescriptions filled from our in-house [facility], bring a Credit/Debit Card  or cash with you. A co-pay may be required.  The pharmacy closes at 5:30 pm.    Children under 18 years of age require a parent/guardian present the entire time that they are here.    Wear loose fitting clothes allowing for bandages.    Please leave money and valuables home.      You  may bring your cell phone.    Call the doctor if fever or illness should occur before your surgery.    Call [phone] to contact us here if needed.

## 2023-02-14 ENCOUNTER — HOSPITAL ENCOUNTER (OUTPATIENT)
Dept: RADIOLOGY | Facility: HOSPITAL | Age: 68
Discharge: HOME | End: 2023-02-14
Attending: RADIOLOGY | Admitting: RADIOLOGY
Payer: COMMERCIAL

## 2023-02-14 ENCOUNTER — HOSPITAL ENCOUNTER (OUTPATIENT)
Dept: INTERVENTIONAL RADIOLOGY/VASCULAR | Facility: HOSPITAL | Age: 68
Discharge: HOME | End: 2023-02-14
Attending: INTERNAL MEDICINE | Admitting: RADIOLOGY
Payer: COMMERCIAL

## 2023-02-14 VITALS
OXYGEN SATURATION: 96 % | TEMPERATURE: 98 F | HEART RATE: 91 BPM | RESPIRATION RATE: 16 BRPM | SYSTOLIC BLOOD PRESSURE: 129 MMHG | DIASTOLIC BLOOD PRESSURE: 69 MMHG

## 2023-02-14 DIAGNOSIS — R97.0 ELEVATED CEA: ICD-10-CM

## 2023-02-14 DIAGNOSIS — R91.8 LUNG MASS: ICD-10-CM

## 2023-02-14 DIAGNOSIS — R91.8 MASS OF LOWER LOBE OF LEFT LUNG: ICD-10-CM

## 2023-02-14 DIAGNOSIS — C20 RECTAL CARCINOMA: ICD-10-CM

## 2023-02-14 DIAGNOSIS — R94.8 ABNORMAL POSITRON EMISSION TOMOGRAPHY (PET) SCAN: ICD-10-CM

## 2023-02-14 PROCEDURE — 88341 PR IHC OR ICC EACH ADD'L SINGLE ANTIBODY  STAINPR: ICD-10-PCS | Mod: 26,,, | Performed by: PATHOLOGY

## 2023-02-14 PROCEDURE — 99153 MOD SED SAME PHYS/QHP EA: CPT | Performed by: RADIOLOGY

## 2023-02-14 PROCEDURE — 88341 IMHCHEM/IMCYTCHM EA ADD ANTB: CPT | Mod: 26,,, | Performed by: PATHOLOGY

## 2023-02-14 PROCEDURE — A4215 STERILE NEEDLE: HCPCS

## 2023-02-14 PROCEDURE — 71045 XR CHEST 1 VIEW: ICD-10-PCS | Mod: 26,,, | Performed by: RADIOLOGY

## 2023-02-14 PROCEDURE — A4550 SURGICAL TRAYS: HCPCS

## 2023-02-14 PROCEDURE — 63600175 PHARM REV CODE 636 W HCPCS: Performed by: RADIOLOGY

## 2023-02-14 PROCEDURE — 88305 TISSUE EXAM BY PATHOLOGIST: CPT | Performed by: PATHOLOGY

## 2023-02-14 PROCEDURE — 32408 IR BIOPSY LUNG W/ GUIDANCE: ICD-10-PCS | Mod: ,,, | Performed by: RADIOLOGY

## 2023-02-14 PROCEDURE — 88342 CHG IMMUNOCYTOCHEMISTRY: ICD-10-PCS | Mod: 26,,, | Performed by: PATHOLOGY

## 2023-02-14 PROCEDURE — 99152 MOD SED SAME PHYS/QHP 5/>YRS: CPT | Performed by: RADIOLOGY

## 2023-02-14 PROCEDURE — 71045 X-RAY EXAM CHEST 1 VIEW: CPT | Mod: TC,FY

## 2023-02-14 PROCEDURE — 99152 MOD SED SAME PHYS/QHP 5/>YRS: CPT

## 2023-02-14 PROCEDURE — 71045 XR CHEST AP PORTABLE: ICD-10-PCS | Mod: 26,76,, | Performed by: RADIOLOGY

## 2023-02-14 PROCEDURE — 71045 X-RAY EXAM CHEST 1 VIEW: CPT | Mod: 26,,, | Performed by: RADIOLOGY

## 2023-02-14 PROCEDURE — 88305 TISSUE EXAM BY PATHOLOGIST: CPT | Mod: 26,,, | Performed by: PATHOLOGY

## 2023-02-14 PROCEDURE — 32408 CORE NDL BX LNG/MED PERQ: CPT | Performed by: RADIOLOGY

## 2023-02-14 PROCEDURE — 99152 IR CT GUIDANCE FOR NEEDLE PLACEMENT: ICD-10-PCS | Mod: ,,, | Performed by: RADIOLOGY

## 2023-02-14 PROCEDURE — 88342 IMHCHEM/IMCYTCHM 1ST ANTB: CPT | Mod: 26,,, | Performed by: PATHOLOGY

## 2023-02-14 PROCEDURE — 88342 IMHCHEM/IMCYTCHM 1ST ANTB: CPT | Performed by: PATHOLOGY

## 2023-02-14 PROCEDURE — 88305 TISSUE EXAM BY PATHOLOGIST: ICD-10-PCS | Mod: 26,,, | Performed by: PATHOLOGY

## 2023-02-14 PROCEDURE — 88341 IMHCHEM/IMCYTCHM EA ADD ANTB: CPT | Mod: 59 | Performed by: PATHOLOGY

## 2023-02-14 PROCEDURE — 71045 X-RAY EXAM CHEST 1 VIEW: CPT | Mod: 26,76,, | Performed by: RADIOLOGY

## 2023-02-14 RX ORDER — MIDAZOLAM HYDROCHLORIDE 1 MG/ML
INJECTION INTRAMUSCULAR; INTRAVENOUS
Status: COMPLETED | OUTPATIENT
Start: 2023-02-14 | End: 2023-02-14

## 2023-02-14 RX ORDER — HYDROCODONE BITARTRATE AND ACETAMINOPHEN 5; 325 MG/1; MG/1
1 TABLET ORAL EVERY 4 HOURS PRN
Status: DISCONTINUED | OUTPATIENT
Start: 2023-02-14 | End: 2023-02-15 | Stop reason: HOSPADM

## 2023-02-14 RX ORDER — FENTANYL CITRATE 50 UG/ML
INJECTION, SOLUTION INTRAMUSCULAR; INTRAVENOUS
Status: COMPLETED | OUTPATIENT
Start: 2023-02-14 | End: 2023-02-14

## 2023-02-14 RX ORDER — MORPHINE SULFATE 10 MG/ML
2 INJECTION INTRAMUSCULAR; INTRAVENOUS; SUBCUTANEOUS
Status: DISCONTINUED | OUTPATIENT
Start: 2023-02-14 | End: 2023-02-15 | Stop reason: HOSPADM

## 2023-02-14 RX ADMIN — FENTANYL CITRATE 50 MCG: 50 INJECTION, SOLUTION INTRAMUSCULAR; INTRAVENOUS at 08:02

## 2023-02-14 RX ADMIN — MIDAZOLAM HYDROCHLORIDE 1 MG: 1 INJECTION, SOLUTION INTRAMUSCULAR; INTRAVENOUS at 08:02

## 2023-02-14 NOTE — BRIEF OP NOTE
Radiology Post-Procedure Note    Pre Op Diagnosis: Metastatic rectal adenocarcinoma  Post Op Diagnosis: Same    Procedure: 1. CT-guided percutaneous Lt posterior-approach 18-gauge core biopsy of suspicious LLL pulmonary mass    Procedure performed by: Lacho Balderrama MD    Written Informed Consent Obtained: Yes  Specimen Removed: YES, 18-G cores x 8  Estimated Blood Loss: none    Findings:   Successful CT-guided percutaneous Lt posterior-approach 18-gauge core biopsy of suspicious LLL pulmonary mass with local anesthetic and moderate conscious sedation. Patient tolerated the procedure well. No immediate post-procedural complications noted.     Immediate and 2 hour post-op CXR to exclude potential for immediate and/or delayed post-procedural complications.    No driving today, heavy lifting, strenuous activity, exercise or submerging in water x 2 days.    Thank you for considering IR for the care of your patient.     Lacho Balderrama MD  Interventional Radiology

## 2023-02-14 NOTE — DISCHARGE SUMMARY
Radiology Discharge Summary      Hospital Course: No complications    Admit Date: 2/14/2023  Discharge Date: 02/14/2023     Instructions Given to Patient: Yes  Diet: Resume prior diet  Activity: no driving today or heavy lifting, strenuous exercise submerging in water x 2 days    Description of Condition on Discharge: Stable  Vital Signs (Most Recent): Temp: 98.2 °F (36.8 °C) (02/14/23 0718)  Pulse: 91 (02/14/23 0909)  Resp: 14 (02/14/23 0909)  BP: (!) 146/80 (02/14/23 0909)  SpO2: 100 % (02/14/23 0909)    Discharge Disposition: Home    Discharge Diagnosis:   67 y.o. male with pertinent PMHx of rectal adenocarcinoma s/p LAR 12/2016 with hepatic metastases s/p hepatic lesion ablation 12/2018 with new PET-CT revealing para-rectal, hepatic  and pulmonary masses concerning for wide-spread metastases requiring image-guided tissue sampling for diagnosis and treatment planning. Pt now s/p successful CT-guided percutaneous Lt posterior-approach 18-gauge core biopsy of suspicious LLL pulmonary mass with local anesthetic and moderate conscious sedation. Patient tolerated the procedure well. No immediate post-procedural complications noted.      No driving today, heavy lifting, strenuous activity, exercise or submerging in water x 2 days.     Thank you for considering IR for the care of your patient.      Lacho Balderrama MD  Interventional Radiology

## 2023-02-14 NOTE — DISCHARGE INSTRUCTIONS
What you should know:    You may experience common minor surgical & anesthesia related discomforts such as: ?  muscle aches, headaches, pain/discomfort, nausea & vomiting. These should improve in   24-48 hrs.    Remove the bandage in 24 hours, then you can shower. Please no baths, do not submerge site in water--tubs, pools etc.     Swelling and discoloration/bruising are common around surgical incision; a cold pack over the   wound during the first 24 hours will help to minimize swelling and bruising.     You have received sedation/anesthesia today: You may NOT drive, drink alcohol, sign legal documents for the next 24 hours.    You may take Acetaminophen (Tylenol) or Ibuprofen (Advil) for minor discomfort, unless you are restricted from using these.    Rest today: A responsible adult should stay with you the first night after your procedure.  You may resume regular activities 24 hours after the procedure. You may drive when you feel comfortable, usually after 24 hours following   surgery.    You may slowly resume your regular diet as tolerated. Drink plenty of fluids the first 48 hours and you may resume your   usual diet.     Activity: No heavy lifting (over 10 pounds), pushing or pulling until your   post op visit. Your doctor's office may have told you to limit your lifting to less weight, or even no weight.  Be sure to follow those instructions.    Call the doctor for any of the following problems:   fever above 101,   severe pain, bleeding, or abdominal distention (swelling).     Please call 911/go to the nearest ER if you feel the need to be seen immediately.    Occasionally small areas of skin numbness or an unpleasant skin sensation can result. Also, you may find that your incision is swollen and tender for a few days.  Some redness around sutures and staples is a normal reaction, but if the discomfort persists or worsens, call you doctor. Please go to nearest Emergency Room/call 911 if you feel your  symptoms need immediate attention.        Fall Prevention  Millions of people fall every year and injure themselves. You may have had anesthesia or sedation which may increase your risk of falling. You may have health issues that put you at an increased risk of falling.     Here are ways to reduce your risk of falling.    Make your home safe by keeping walkways clear of objects you may trip over.  Use non-slip pads under rugs. Do not use area rugs or small throw rugs.  Use non-slip mats in bathtubs and showers.  Install handrails and lights on staircases.  Do not walk in poorly lit areas.  Do not stand on chairs or wobbly ladders.  Use caution when reaching overhead or looking upward. This position can cause a loss of balance.  Be sure your shoes fit properly, have non-slip bottoms and are in good condition.   Wear shoes both inside and out. Avoid going barefoot or wearing slippers.  Be cautious when going up and down stairs, curbs, and when walking on uneven sidewalks.  If your balance is poor, consider using a cane or walker.  If your fall was related to alcohol use, stop or limit alcohol intake.   If your fall was related to use of sleeping medicines, talk to your doctor about this. You may need to reduce your dosage at bedtime if you awaken during the night to go to the bathroom.    To reduce the need for nighttime bathroom trips:  Avoid drinking fluids for several hours before going to bed  Empty your bladder before going to bed  Men can keep a urinal at the bedside  Stay as active as you can. Balance, flexibility, strength, and endurance all come from exercise. They all play a role in preventing falls. Ask your healthcare provider which types of activity are right for you.  Get your vision checked on a regular basis.  If you have pets, know where they are before you stand up or walk so you don't trip over them.  Use night lights.

## 2023-02-14 NOTE — H&P
Radiology History & Physical      SUBJECTIVE:     Chief Complaint: Metastatic rectal adenocarcinoma    History of Present Illness:  Tr Chen is a 67 y.o. male with pertinent PMHx of rectal adenocarcinoma s/p LAR 12/2016 with hepatic metastases s/p hepatic lesion ablation 12/2018 with new PET-CT revealing para-rectal, hepatic  and pulmonary masses concerning for wide-spread metastases requiring image-guided tissue sampling for diagnosis and treatment planning.    A new outpatient IR consult received for CT-guided percutaneous Lt posterior-approach 18-gauge core biopsy of suspicious LLL pulmonary mass.    Past Medical History:   Diagnosis Date    Cancer     metastatic colon cancer    Chronic heel pain     Bilateral    Chronic pain 11/20/2012    Erectile dysfunction 5/16/2014    History of chemotherapy 11/28/2017    History of colonic polyps 7/16/2012    HTN (hypertension) 7/16/2012    Hypertension     Liver lesion     October biopsy showed liver mets    Tobacco use disorder 1/19/2015     Past Surgical History:   Procedure Laterality Date    COLON SURGERY  12/27/2016    colon resection    COLONOSCOPY N/A 11/21/2016    Procedure: COLONOSCOPY;  Surgeon: Jono Resendez MD;  Location: [facility] ENDO;  Service: Endoscopy;  Laterality: N/A;  needs scope done 2/24/16  -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     COLONOSCOPY N/A 12/27/2017    Procedure: COLONOSCOPY;  Surgeon: Jono eRsendez MD;  Location: [facility] ENDO;  Service: Endoscopy;  Laterality: N/A;    COLONOSCOPY N/A 12/22/2020    Procedure: COLONOSCOPY;  Surgeon: Portillo Mcclelland MD;  Location: [facility] ENDO;  Service: Gastroenterology;  Laterality: N/A;  SPOKE TO CLIENT R/T PREP AND COVID TESTING (12/19/2020 @ URGENT CARE); CONFIRMED UNDERSTANDING.    FRACTURE SURGERY      both feet    LIVER BIOPSY N/A 10/18/2018    Procedure: BIOPSY, LIVER;  Surgeon: Zaire Diagnostic Provider;  Location: [facility] OR;  Service: Radiology;  Laterality: N/A;  9AM START  RN PREOP 10/12/2018     TONSILLECTOMY       Home Meds:   Prior to Admission medications    Medication Sig Start Date End Date Taking? Authorizing Provider   HYDROcodone-acetaminophen (NORCO) 5-325 mg per tablet Take 1 tablet by mouth every 6 (six) hours as needed for Pain. 12/19/22  Yes Cornelius Bragg MD   HYDROcodone-acetaminophen (NORCO) 5-325 mg per tablet Take 1 tablet by mouth every 6 (six) hours as needed for Pain (Chronic pain, G89.4). :MORE THAN A SEVEN DAY SUPPLY OF OPIOID IS MEDICALLY NECESSARY. 11/19/22   Cornelius Bragg MD   HYDROcodone-acetaminophen (NORCO) 5-325 mg per tablet Take 1 tablet by mouth every 6 (six) hours as needed for Pain. :MORE THAN A SEVEN DAY SUPPLY OF OPIOID IS MEDICALLY NECESSARY. 10/19/22   Cornelius Bragg MD   HYDROcodone-acetaminophen (NORCO) 5-325 mg per tablet Take 1 tablet by mouth every 6 (six) hours as needed for Pain (Chronic pain, G89.4). :MORE THAN A SEVEN DAY SUPPLY OF OPIOID IS MEDICALLY NECESSARY. 3/18/23   Cornelius Bragg MD   HYDROcodone-acetaminophen (NORCO) 5-325 mg per tablet Take 1 tablet by mouth every 6 (six) hours as needed for Pain (Chronic pain, G89.4). :MORE THAN A SEVEN DAY SUPPLY OF OPIOID IS MEDICALLY NECESSARY. 2/18/23   Cornelius Bragg MD   HYDROcodone-acetaminophen (NORCO) 5-325 mg per tablet Take 1 tablet by mouth every 6 (six) hours as needed for Pain (Chronic pain, G89.4). :MORE THAN A SEVEN DAY SUPPLY OF OPIOID IS MEDICALLY NECESSARY. 1/19/23   Cornelius Bragg MD   insulin syringe-needle U-100 1 mL 31 gauge x 5/16 Syrg Use as directed with [facility] Therapy 3/9/20   Radha Miles NP   lactulose (CHRONULAC) 10 gram/15 mL solution SMARTSIG:15 Milliliter(s) By Mouth Twice Daily PRN 9/26/22   Historical Provider   papaverine 30 mg/mL injection Add: Phentolamine 1 mg Add: PGE1 10 mcg Sig:  Inject 20 units as directed; increase up 5 units until desired results. 1/19/23   Cornelius Bragg MD   sildenafil (VIAGRA) 100 MG tablet Take 1 tablet (100 mg  total) by mouth daily as needed for Erectile Dysfunction. half -1 Tablet Oral .  Take 30 min before anticipated need.  Patient taking differently: Take 100 mg by mouth daily as needed for Erectile Dysfunction. half -1 Tablet Oral .  Take 30 min before anticipated need. 5/23/19   Cornelius Bragg MD   tadalafiL (CIALIS) 20 MG Tab TAKE 1 TABLET BY MOUTH EVERY 36 HOURS 2/9/22   Cornelius Bragg MD     Anticoagulants/Antiplatelets: no anticoagulation    Allergies:   Review of patient's allergies indicates:   Allergen Reactions    No known allergies      Sedation History:  no adverse reactions    Review of Systems:   Hematological: no known coagulopathies  Respiratory: positive for - cough and pleuritic pain  Cardiovascular: positive for - chest pain  Gastrointestinal: no abdominal pain, change in bowel habits, or black or bloody stools  Genito-Urinary: no dysuria, trouble voiding, or hematuria  Musculoskeletal: negative  Neurological: no TIA or stroke symptoms       OBJECTIVE:     Vital Signs (Most Recent)  Temp: 98.2 °F (36.8 °C) (02/14/23 0718)  Pulse: 88 (02/14/23 0718)  Resp: 18 (02/14/23 0718)  BP: 129/73 (02/14/23 0718)  SpO2: 96 % (02/14/23 0718)    Physical Exam:  ASA: III  Mallampati: II    General: no acute distress  Mental Status: alert and oriented to person, place and time  HEENT: normocephalic, atraumatic  Chest: unlabored breathing  Heart: regular heart rate  Abdomen: nondistended  Extremity: moves all extremities    Laboratory  Lab Results   Component Value Date    INR 1.1 02/13/2023       Lab Results   Component Value Date    WBC 8.65 02/13/2023    HGB 12.0 (L) 02/13/2023    HCT 38.0 (L) 02/13/2023    MCV 87 02/13/2023     02/13/2023      Lab Results   Component Value Date    GLU 87 02/13/2023     02/13/2023    K 4.4 02/13/2023     02/13/2023    CO2 24 02/13/2023    BUN 8 02/13/2023    CREATININE 0.9 02/13/2023    CALCIUM 9.0 02/13/2023    MG 1.8 05/04/2020    ALT 9 (L)  02/13/2023    AST 16 02/13/2023    ALBUMIN 3.1 (L) 02/13/2023    BILITOT 0.4 02/13/2023     ASSESSMENT/PLAN:     67 y.o. male with pertinent PMHx of rectal adenocarcinoma s/p LAR 12/2016 with hepatic metastases s/p hepatic lesion ablation 12/2018 with new PET-CT revealing para-rectal, hepatic  and pulmonary masses concerning for wide-spread metastases requiring image-guided tissue sampling for diagnosis and treatment planning.    Metastatic rectal adenocarcinoma - Will attempt CT-guided percutaneous Lt posterior-approach 18-gauge core biopsy of suspicious LLL pulmonary mass with local anesthetic and moderate conscious sedation.    Risks (including, but not limited to, pain, bleeding, infection, damage to nearby structures, failure to obtain sufficient material for a diagnosis, the need for additional procedures, and death), benefits, and alternatives were discussed with the patient. All questions were answered to the best of my abilities. The patient wishes to proceed with the procedure. Written informed consent was obtained.    Thank you for considering IR for the care of your patient.     Lacho Balderrama MD  Interventional Radiology

## 2023-02-14 NOTE — PLAN OF CARE
Pre-op plan of care reviewed with patient. Admit assessment complete. Questions encouraged and answered. Post-op education begun with pt.

## 2023-02-27 ENCOUNTER — LAB VISIT (OUTPATIENT)
Dept: LAB | Facility: HOSPITAL | Age: 68
End: 2023-02-27
Attending: INTERNAL MEDICINE
Payer: MEDICARE

## 2023-02-27 DIAGNOSIS — R97.0 ELEVATED CEA: ICD-10-CM

## 2023-02-27 DIAGNOSIS — C20 RECTAL CARCINOMA: ICD-10-CM

## 2023-02-27 LAB
ALBUMIN SERPL BCP-MCNC: 3 G/DL (ref 3.5–5.2)
ALP SERPL-CCNC: 132 U/L (ref 55–135)
ALT SERPL W/O P-5'-P-CCNC: 5 U/L (ref 10–44)
ANION GAP SERPL CALC-SCNC: 12 MMOL/L (ref 8–16)
AST SERPL-CCNC: 19 U/L (ref 10–40)
BASOPHILS # BLD AUTO: 0.04 K/UL (ref 0–0.2)
BASOPHILS NFR BLD: 0.4 % (ref 0–1.9)
BILIRUB SERPL-MCNC: 0.3 MG/DL (ref 0.1–1)
BUN SERPL-MCNC: 8 MG/DL (ref 8–23)
CALCIUM SERPL-MCNC: 8.9 MG/DL (ref 8.7–10.5)
CEA SERPL-MCNC: 28.9 NG/ML (ref 0–5)
CHLORIDE SERPL-SCNC: 103 MMOL/L (ref 95–110)
CO2 SERPL-SCNC: 21 MMOL/L (ref 23–29)
CREAT SERPL-MCNC: 0.8 MG/DL (ref 0.5–1.4)
DIFFERENTIAL METHOD: ABNORMAL
EOSINOPHIL # BLD AUTO: 0.2 K/UL (ref 0–0.5)
EOSINOPHIL NFR BLD: 1.7 % (ref 0–8)
ERYTHROCYTE [DISTWIDTH] IN BLOOD BY AUTOMATED COUNT: 15.2 % (ref 11.5–14.5)
EST. GFR  (NO RACE VARIABLE): >60 ML/MIN/1.73 M^2
GLUCOSE SERPL-MCNC: 98 MG/DL (ref 70–110)
HCT VFR BLD AUTO: 36.5 % (ref 40–54)
HGB BLD-MCNC: 11.6 G/DL (ref 14–18)
IMM GRANULOCYTES # BLD AUTO: 0.05 K/UL (ref 0–0.04)
IMM GRANULOCYTES NFR BLD AUTO: 0.5 % (ref 0–0.5)
LYMPHOCYTES # BLD AUTO: 1.5 K/UL (ref 1–4.8)
LYMPHOCYTES NFR BLD: 16.8 % (ref 18–48)
MCH RBC QN AUTO: 27.5 PG (ref 27–31)
MCHC RBC AUTO-ENTMCNC: 31.8 G/DL (ref 32–36)
MCV RBC AUTO: 87 FL (ref 82–98)
MONOCYTES # BLD AUTO: 0.9 K/UL (ref 0.3–1)
MONOCYTES NFR BLD: 9.3 % (ref 4–15)
NEUTROPHILS # BLD AUTO: 6.5 K/UL (ref 1.8–7.7)
NEUTROPHILS NFR BLD: 71.3 % (ref 38–73)
NRBC BLD-RTO: 0 /100 WBC
PLATELET # BLD AUTO: 346 K/UL (ref 150–450)
PMV BLD AUTO: 8.2 FL (ref 9.2–12.9)
POTASSIUM SERPL-SCNC: 4 MMOL/L (ref 3.5–5.1)
PROT SERPL-MCNC: 7.4 G/DL (ref 6–8.4)
RBC # BLD AUTO: 4.22 M/UL (ref 4.6–6.2)
SODIUM SERPL-SCNC: 136 MMOL/L (ref 136–145)
WBC # BLD AUTO: 9.17 K/UL (ref 3.9–12.7)

## 2023-02-27 PROCEDURE — 36415 COLL VENOUS BLD VENIPUNCTURE: CPT | Performed by: INTERNAL MEDICINE

## 2023-02-27 PROCEDURE — 80053 COMPREHEN METABOLIC PANEL: CPT | Performed by: INTERNAL MEDICINE

## 2023-02-27 PROCEDURE — 82378 CARCINOEMBRYONIC ANTIGEN: CPT | Performed by: INTERNAL MEDICINE

## 2023-02-27 PROCEDURE — 85025 COMPLETE CBC W/AUTO DIFF WBC: CPT | Performed by: INTERNAL MEDICINE

## 2023-03-01 ENCOUNTER — OFFICE VISIT (OUTPATIENT)
Dept: HEMATOLOGY/ONCOLOGY | Facility: CLINIC | Age: 68
End: 2023-03-01
Payer: MEDICARE

## 2023-03-01 VITALS
OXYGEN SATURATION: 96 % | HEIGHT: 75 IN | HEART RATE: 99 BPM | WEIGHT: 159.19 LBS | DIASTOLIC BLOOD PRESSURE: 86 MMHG | BODY MASS INDEX: 19.79 KG/M2 | SYSTOLIC BLOOD PRESSURE: 145 MMHG

## 2023-03-01 DIAGNOSIS — C20 RECTAL CARCINOMA: Primary | ICD-10-CM

## 2023-03-01 DIAGNOSIS — C78.00 MALIGNANT NEOPLASM METASTATIC TO LUNG, UNSPECIFIED LATERALITY: ICD-10-CM

## 2023-03-01 DIAGNOSIS — C78.7 METASTASES TO THE LIVER: ICD-10-CM

## 2023-03-01 PROCEDURE — 99213 OFFICE O/P EST LOW 20 MIN: CPT | Mod: PBBFAC | Performed by: INTERNAL MEDICINE

## 2023-03-01 PROCEDURE — 99999 PR PBB SHADOW E&M-EST. PATIENT-LVL III: CPT | Mod: PBBFAC,,, | Performed by: INTERNAL MEDICINE

## 2023-03-01 PROCEDURE — 99215 PR OFFICE/OUTPT VISIT, EST, LEVL V, 40-54 MIN: ICD-10-PCS | Mod: S$PBB,,, | Performed by: INTERNAL MEDICINE

## 2023-03-01 PROCEDURE — 99215 OFFICE O/P EST HI 40 MIN: CPT | Mod: S$PBB,,, | Performed by: INTERNAL MEDICINE

## 2023-03-01 PROCEDURE — 99999 PR PBB SHADOW E&M-EST. PATIENT-LVL III: ICD-10-PCS | Mod: PBBFAC,,, | Performed by: INTERNAL MEDICINE

## 2023-03-01 NOTE — Clinical Note
F/u  1 month with  CBC, CMP,CEA prior to f/u   TEMPUS BLOOD TEST TOMORROW AT 11 AM   Needs tempus results PRIOR to f/u

## 2023-03-01 NOTE — PROGRESS NOTES
Subjective:         Patient ID: Tr Chen is a 68 y.o. male.    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 CT imaging studies negative for evidence of distant disease.  1/27/2017 PET/CT imaging  reveals   hepatic metastatic lesion . He subsequently underwent CT guided liver bx 2/3/2017 - NEG for malignancy .    status post LAR,12/27/2016.pT3 pN1cM0 Stage IIIB  He is s/p adjuvant XELOX cycle 4  Completed 4/17/2017  Followed by chemo/RT initiated  5/22/2017   He completed XRT July 6, 2017  s/p partial completion of XELODA approx 6/21/2017 ( due to ASE)    PET scan on 8/16/2018 showed continued growth of this lesion to 3.8cm. LIver lesion was biopsied again on 10/18/2018. Pathology revealed adenocarcinoma compatible with a metastatic carcinoma of colorectal origin.    Pt delayed follow up imaging ( work related issues)   PET/CT 12/18/2018 reveals  Isolated right lobe liver metastasis more metabolic from the prior study more prominent worrisome for progression and poor response to therapy.  No new lesions are seen.   Pt followed by IR   s/p ablation of liver lesion 12/31/2018   MRI abd w w/o contrast 2/14/2019 reveals interval right hepatic lobe ablation with no residual enhancement at site,no new concerning liver lesions noed     Interval Hx: Lost to follow up   Pt recently re established care   HE IS OFFICIALLY  RETIRED FROM [employer]  FEB 11TH  He delayed lung biopsy 2/2 work related issues   APPETITE DIMINISHED AND WT DOWN  He reports about 20 lb wt loss past 6mos  NO PAIN   No shortness of breath or chest pain  NO COUGH  No N/V   No melena, hematochezia   HE REPORTS INCREASE IN BM  NO DIARRHEA  He continues to smoke   No fatigue  No weakness      He underwent LUNG Biopsy 2/14/23 Positive for malignancy, adenocarcinoma             Prev Hx: Pt  with past medical history of colonic polyps, hypertension, tobacco abuse, seen today in consultation for  diagnosed rectosigmoid adenocarcinoma.  The patient reports intermittent rectal bleeding for the past 3 to 4 months.  He subsequently underwent a colonoscopy on 11/21/2016, which revealed polyps noted in the descending colon, sigmoid colon, and rectum and also noted for a malignant-appearing partially obstructing tumor in the distal sigmoid colon with ulceration. Pathology of the rectosigmoid mass revealed a tubulovillous adenoma and polyps benign..The patient reports he underwent a colonoscopy during his 40s for rectal bleeding.  He was diagnosed with a large bleeding polyp, for which he underwent removal.  He did not undergo surveillance colonoscopies as advised.  No family history of colon cancer.  CT of the abdomen and pelvis on 11/22/2016 revealed thickening of the rectal wall, findings, rectosigmoid neoplasm at the rectosigmoid junction and numerous hepatic lesions compatible with hepatic cysts. He  subsequently underwent a rectal sigmoidectomy on 12/27/2016 .  Pathology revealed an adenocarcinoma, low grade, clear margins. The patient underwent removal of 24 lymph nodes with 0 lymph nodes involved.  Evidence of tumor deposits noted with pathologic staging pT3, pN1c.            Review of Systems   Constitutional:  Negative for appetite change, fatigue, fever and unexpected weight change.   HENT:  Negative for mouth sores.    Eyes:  Negative for visual disturbance.  "  Respiratory:  Negative for cough and shortness of breath.    Cardiovascular:  Negative for chest pain.   Gastrointestinal:  Negative for abdominal pain and diarrhea.   Genitourinary:  Negative for frequency.   Musculoskeletal:  Negative for back pain.   Integumentary:  Negative for rash.   Neurological:  Negative for headaches.   Hematological:  Negative for adenopathy.   Psychiatric/Behavioral:  The patient is not nervous/anxious.        Objective:       Vitals:    03/01/23 0831   BP: (!) 145/86   BP Location: Left arm   Patient Position: Sitting   BP Method: Large (Automatic)   Pulse: 99   SpO2: 96%   Weight: 72.2 kg (159 lb 2.8 oz)   Height: 6' 3" (1.905 m)       Physical Exam   Constitutional: She is oriented to person, place, and time. She appears well-developed and well-nourished.   HENT:   Head: Normocephalic.   Mouth/Throat: Oropharynx is clear and moist. No oropharyngeal exudate.   Eyes: Conjunctivae and lids are normal. Pupils are equal, round, and reactive to light. No scleral icterus.   Neck: Normal range of motion. Neck supple. No thyromegaly present.   Cardiovascular: Normal rate, regular rhythm and normal heart sounds.    No murmur heard.  Pulmonary/Chest: Breath sounds normal. She has no wheezes. She has no rales.   Abdominal: Soft. Bowel sounds are normal. There is no tenderness. There is no rebound and no guarding.   Musculoskeletal: Normal range of motion. She exhibits no edema and no tenderness.   Lymphadenopathy:     She has no cervical adenopathy.     She has no axillary adenopathy.        Right: No supraclavicular adenopathy present.        Left: No supraclavicular adenopathy present.   Neurological: She is alert and oriented to person, place, and time. No cranial nerve deficit. Coordination normal.   Skin: Skin is warm and dry. No ecchymosis, no petechiae and no rash noted. No erythema.   Psychiatric: She has a normal mood and affect.          MRI abd w w/out contrast 2/14/2019  IMPRESSION: "      Interval right hepatic lobe ablation.  No residual enhancement at this site.     No new concerning liver lesions identified, noting limited evaluation due to innumerable underlying cysts of varying sizes.  Continued CT PET imaging for future follow-up recommended.    CT c/a/p w/contrast  11/14/22   Impression:     Index left lung metastasis 8.8 x 6.7 cm.     Index liver metastasis right lobe measuring 8 x 6.4 cm.     Index right pelvic mass obstructing the right ureter 3 cm.     Index thickening right rectosigmoid junction 1.5 cm.     PET-CT scan could be helpful      PET/CT 12/1/22  Impression:     In this patient with rectosigmoid adenocarcinoma status post low anterior resection, there is hypermetabolic thickening of the rectosigmoid anastomosis with an adjacent hypermetabolic para-rectal right pelvic mass consistent with malignancy.     There are additional numerous hypermetabolic lesions throughout the liver and bilateral lungs worrisome for malignancy.     Incidental hypermetabolic focus in the left side of the prostate gland.  Differential considerations include focal prostatitis versus malignancy.     Similar to comparison CT abdomen pelvis 11/14/2022, the para-rectal right pelvic mass obstructs the right ureter resulting in grossly stable upstream hydroureteronephrosis.       Left lung, biopsy: 2/14/23   Positive for malignancy, adenocarcinoma   Abundant tumor necrosis   Immunohistochemical stains pending for further classification of primary site.  VC       Comment: Interp By Girish Tyler MD, Signed on 02/23/2023 at 10:26   Supplemental Diagnosis     SUPPLEMENTAL #2   Immunohistochemistry (IHC) Testing for Mismatch Repair (MMR) Proteins:   MLH1 - Intact nuclear expression   MSH2 - Intact nuclear expression   MSH6 - Intact nuclear expression   PMS2 - Intact nuclear expression            Assessment:       Problem List Items Addressed This Visit          Oncology    Rectal carcinoma -  Primary  68-year-old male with diagnosed rectosigmoid adenocarcinoma, status post LAR,12/27/2016.pT3 pN1c with 0 of 24 lymph nodes involved with tumor deposits present.    CT imaging  - negative for evidence of distant disease.     PET/CT imaging studies - hepatic lesion  S/p liver bx 2/3/2017  - benign   status post LAR,12/27/2016.pT3 pN1cM0 Stage IIIB  He is s/p adjuvant XELOX cycle 4  Completed 4/17/2017  Followed by chemo/RT initiated  5/22/2017   s/p partial completion of XELODA approx 6/21/2017 ( due to ASE)   PET/CT 12/18/2018 reveals  Isolated right lobe liver metastasis more metabolic from the prior study more prominent worrisome for progression and poor response to therapy.  No new lesions are seen.   S/p ablation of liver lesion 12/31/2018   Follow-up MRI abd w w/out contrast 2/14/2019 reveals no areas of enhancement, no new liver lesions  Last colonoscopy 2020benign polyps , suboptimal prep    PET/CT 12/1/22  shows hypermetabolic thickening of the rectosigmoid anastomosis with an adjacent hypermetabolic para-rectal right pelvic mass consistent with malignancy. additional numerous hypermetabolic lesions throughout the liver and bilateral lungs worrisome for malignancy.    LUNG Biopsy 2/14/23 Positive for malignancy, adenocarcinoma   Detailed discussion with patient regarding radiographic and pathology findings. Discussed staging and prognosis. Pt adamant he does not want to proceed with palliative chemotherapy 2/2 potential toxicities. He also may be relocating to [place].   Plan Ambulatory Referral to Palliative Care     Relevant Orders    CBC Auto Differential        Secondary malignant neoplasm   to liver          Malignant neoplasm metastatic to lung                        Follow-up :         F/u  1 month with  CBC, CMP,CEA prior to f/u    TEMPUS BLOOD TEST TOMORROW AT 11 AM       I spent a total of  45 minutes on the day of the visit.This includes face to face time and non-face to face time preparing  to see the patient (eg, review of tests), obtaining and/or reviewing separately obtained history, documenting clinical information in the electronic or other health record, independently interpreting results and communicating results to the patient/family/caregiver, and coordinating care.      Cc: Cornelius Bragg MD

## 2023-03-03 ENCOUNTER — TELEPHONE (OUTPATIENT)
Dept: HEMATOLOGY/ONCOLOGY | Facility: CLINIC | Age: 68
End: 2023-03-03
Payer: MEDICARE

## 2023-03-03 DIAGNOSIS — C79.9 METASTATIC ADENOCARCINOMA: ICD-10-CM

## 2023-03-03 DIAGNOSIS — C20 RECTAL CARCINOMA: Primary | ICD-10-CM

## 2023-03-08 ENCOUNTER — TELEPHONE (OUTPATIENT)
Dept: HEMATOLOGY/ONCOLOGY | Facility: CLINIC | Age: 68
End: 2023-03-08
Payer: MEDICARE

## 2023-03-08 NOTE — TELEPHONE ENCOUNTER
Called patient to discuss Tempus testing, LVM, and sent [employer] with further info.

## 2023-03-08 NOTE — TELEPHONE ENCOUNTER
Called patient, LVM, and sent [employer]Books in [employer] message regarding Tempus testing and scheduling lab work.

## 2023-03-13 ENCOUNTER — TELEPHONE (OUTPATIENT)
Dept: HEMATOLOGY/ONCOLOGY | Facility: CLINIC | Age: 68
End: 2023-03-13
Payer: MEDICARE

## 2023-03-13 NOTE — TELEPHONE ENCOUNTER
----- Message from Aster Sanchez RN sent at 3/13/2023 11:21 AM CDT -----    ----- Message -----  From: Ashley Cotton  Sent: 3/13/2023  10:39 AM CDT  To: [facility] Staff     Name of Who is Calling:     What is the request in detail:  patient request call back in reference to  reschedule lab for tomorrow  Please contact to further discuss and advise      Can the clinic reply by [facility]:     What Number to Call Back if not in [facility]:  [phone]

## 2023-03-13 NOTE — TELEPHONE ENCOUNTER
Spoke with patient. Patient would like to have labs done tomorrow as he just arrived back in town. Labs rescheduled per patient request.

## 2023-03-23 LAB
DNA RANGE(S) EXAMINED NAR: NORMAL
GENE DIS ANL INTERP-IMP: POSITIVE
GENE DIS ASSESSED: NORMAL
GENE MUT TESTED BLD/T: 4.2 M/MB
MLH1+MSH2+MSH6+PMS2 GN DEL+DUP+FUL M: NORMAL
MMR ENDO PMS2 CA SPEC QL IMSTN: PRESENT
MMR PROT MLH1 CA SPEC QL IMSTN: PRESENT
MMR PROT MSH2 CA SPEC QL IMSTN: PRESENT
MMR PROT MSH6 CA SPEC QL IMSTN: PRESENT
MSI CA SPEC-IMP: NORMAL
REASON FOR STUDY: NORMAL
TEMPUS FUSIONADDENDUM: NORMAL
TEMPUS PERTINENTNEGATIVES: NORMAL
TEMPUS PORTAL: NORMAL
TEMPUS THERAPY1: NORMAL
TEMPUS THERAPYCOUNT: 1
TEMPUS TRIAL1: NORMAL
TEMPUS TRIAL2: NORMAL
TEMPUS TRIAL3: NORMAL
TEMPUS TRIALCOUNT: 3

## 2023-03-24 LAB
DNA RANGE(S) EXAMINED NAR: NORMAL
GENE DIS ANL INTERP-IMP: POSITIVE
GENE DIS ASSESSED: NORMAL
MSI CA SPEC-IMP: NOT DETECTED
REASON FOR STUDY: NORMAL
TEMPUS LCA: NORMAL
TEMPUS PORTAL: NORMAL
TEMPUS THERAPY1: NORMAL
TEMPUS THERAPYCOUNT: 1
TEMPUS TRIAL1: NORMAL
TEMPUS TRIAL2: NORMAL
TEMPUS TRIAL3: NORMAL
TEMPUS TRIALCOUNT: 3

## 2023-03-31 ENCOUNTER — LAB VISIT (OUTPATIENT)
Dept: LAB | Facility: HOSPITAL | Age: 68
End: 2023-03-31
Attending: INTERNAL MEDICINE
Payer: COMMERCIAL

## 2023-03-31 DIAGNOSIS — C78.00 MALIGNANT NEOPLASM METASTATIC TO LUNG, UNSPECIFIED LATERALITY: ICD-10-CM

## 2023-03-31 DIAGNOSIS — C20 RECTAL CARCINOMA: ICD-10-CM

## 2023-03-31 DIAGNOSIS — C78.7 METASTASES TO THE LIVER: ICD-10-CM

## 2023-03-31 LAB
ALBUMIN SERPL BCP-MCNC: 3.2 G/DL (ref 3.5–5.2)
ALP SERPL-CCNC: 135 U/L (ref 55–135)
ALT SERPL W/O P-5'-P-CCNC: 9 U/L (ref 10–44)
ANION GAP SERPL CALC-SCNC: 9 MMOL/L (ref 8–16)
AST SERPL-CCNC: 15 U/L (ref 10–40)
BASOPHILS # BLD AUTO: 0.05 K/UL (ref 0–0.2)
BASOPHILS NFR BLD: 0.6 % (ref 0–1.9)
BILIRUB SERPL-MCNC: 0.4 MG/DL (ref 0.1–1)
BUN SERPL-MCNC: 9 MG/DL (ref 8–23)
CALCIUM SERPL-MCNC: 9.2 MG/DL (ref 8.7–10.5)
CEA SERPL-MCNC: 26.1 NG/ML (ref 0–5)
CHLORIDE SERPL-SCNC: 101 MMOL/L (ref 95–110)
CO2 SERPL-SCNC: 23 MMOL/L (ref 23–29)
CREAT SERPL-MCNC: 0.9 MG/DL (ref 0.5–1.4)
DIFFERENTIAL METHOD: ABNORMAL
EOSINOPHIL # BLD AUTO: 0.2 K/UL (ref 0–0.5)
EOSINOPHIL NFR BLD: 1.9 % (ref 0–8)
ERYTHROCYTE [DISTWIDTH] IN BLOOD BY AUTOMATED COUNT: 14.7 % (ref 11.5–14.5)
EST. GFR  (NO RACE VARIABLE): >60 ML/MIN/1.73 M^2
GLUCOSE SERPL-MCNC: 106 MG/DL (ref 70–110)
HCT VFR BLD AUTO: 37.6 % (ref 40–54)
HGB BLD-MCNC: 12 G/DL (ref 14–18)
IMM GRANULOCYTES # BLD AUTO: 0.04 K/UL (ref 0–0.04)
IMM GRANULOCYTES NFR BLD AUTO: 0.5 % (ref 0–0.5)
LYMPHOCYTES # BLD AUTO: 1.5 K/UL (ref 1–4.8)
LYMPHOCYTES NFR BLD: 17.1 % (ref 18–48)
MCH RBC QN AUTO: 27.6 PG (ref 27–31)
MCHC RBC AUTO-ENTMCNC: 31.9 G/DL (ref 32–36)
MCV RBC AUTO: 86 FL (ref 82–98)
MONOCYTES # BLD AUTO: 0.9 K/UL (ref 0.3–1)
MONOCYTES NFR BLD: 10.1 % (ref 4–15)
NEUTROPHILS # BLD AUTO: 6 K/UL (ref 1.8–7.7)
NEUTROPHILS NFR BLD: 69.8 % (ref 38–73)
NRBC BLD-RTO: 0 /100 WBC
PLATELET # BLD AUTO: 379 K/UL (ref 150–450)
PMV BLD AUTO: 8.2 FL (ref 9.2–12.9)
POTASSIUM SERPL-SCNC: 3.9 MMOL/L (ref 3.5–5.1)
PROT SERPL-MCNC: 7.7 G/DL (ref 6–8.4)
RBC # BLD AUTO: 4.35 M/UL (ref 4.6–6.2)
SODIUM SERPL-SCNC: 133 MMOL/L (ref 136–145)
WBC # BLD AUTO: 8.64 K/UL (ref 3.9–12.7)

## 2023-03-31 PROCEDURE — 80053 COMPREHEN METABOLIC PANEL: CPT | Performed by: INTERNAL MEDICINE

## 2023-03-31 PROCEDURE — 36415 COLL VENOUS BLD VENIPUNCTURE: CPT | Performed by: INTERNAL MEDICINE

## 2023-03-31 PROCEDURE — 82378 CARCINOEMBRYONIC ANTIGEN: CPT | Performed by: INTERNAL MEDICINE

## 2023-03-31 PROCEDURE — 85025 COMPLETE CBC W/AUTO DIFF WBC: CPT | Performed by: INTERNAL MEDICINE

## 2023-04-03 ENCOUNTER — TELEPHONE (OUTPATIENT)
Dept: HEMATOLOGY/ONCOLOGY | Facility: CLINIC | Age: 68
End: 2023-04-03
Payer: MEDICARE

## 2023-04-03 NOTE — TELEPHONE ENCOUNTER
Patient called and stated that he has appointment this morning for 820 but has caught a stomach bug and will not make appointment today. Patient rescheduled to 4/10 at 8:40, he wants to know if he needs to repeat blood work. [email or web address]

## 2023-04-07 NOTE — PROGRESS NOTES
/116, seen and examined by DR Browne, Labetalol given as prescribe, will continue to monitor  
IR notified pt ready for procedure, BAL and Dr. Cardoza aware pt has dentures that are very difficult to remove, gave [facility](BAL) beeper number to call when ready for pt in IR.  
Pt arrive to the IR Dept for [facility]  Liver tumor  
Pt stable, comfortable, no n/v, latest v/s within normal ranges, post op discharge instructions given, copies provided, prescription meds given to Pt from pharmacy  
Ambulatory

## 2023-04-10 ENCOUNTER — OFFICE VISIT (OUTPATIENT)
Dept: HEMATOLOGY/ONCOLOGY | Facility: CLINIC | Age: 68
End: 2023-04-10
Payer: MEDICARE

## 2023-04-10 VITALS
WEIGHT: 153 LBS | DIASTOLIC BLOOD PRESSURE: 85 MMHG | OXYGEN SATURATION: 97 % | SYSTOLIC BLOOD PRESSURE: 142 MMHG | HEIGHT: 75 IN | BODY MASS INDEX: 19.02 KG/M2 | HEART RATE: 107 BPM

## 2023-04-10 DIAGNOSIS — C20 RECTAL CARCINOMA: Primary | ICD-10-CM

## 2023-04-10 DIAGNOSIS — N13.30 HYDRONEPHROSIS, UNSPECIFIED HYDRONEPHROSIS TYPE: ICD-10-CM

## 2023-04-10 DIAGNOSIS — C78.00 MALIGNANT NEOPLASM METASTATIC TO LUNG, UNSPECIFIED LATERALITY: ICD-10-CM

## 2023-04-10 DIAGNOSIS — C78.7 METASTASES TO THE LIVER: ICD-10-CM

## 2023-04-10 PROCEDURE — 99497 ADVNCD CARE PLAN 30 MIN: CPT | Mod: S$PBB,,, | Performed by: INTERNAL MEDICINE

## 2023-04-10 PROCEDURE — 99215 PR OFFICE/OUTPT VISIT, EST, LEVL V, 40-54 MIN: ICD-10-PCS | Mod: S$PBB,,, | Performed by: INTERNAL MEDICINE

## 2023-04-10 PROCEDURE — 99213 OFFICE O/P EST LOW 20 MIN: CPT | Mod: PBBFAC,25 | Performed by: INTERNAL MEDICINE

## 2023-04-10 PROCEDURE — 99999 PR PBB SHADOW E&M-EST. PATIENT-LVL III: CPT | Mod: PBBFAC,,, | Performed by: INTERNAL MEDICINE

## 2023-04-10 PROCEDURE — 99999 PR PBB SHADOW E&M-EST. PATIENT-LVL III: ICD-10-PCS | Mod: PBBFAC,,, | Performed by: INTERNAL MEDICINE

## 2023-04-10 PROCEDURE — 99497 ADVNCD CARE PLAN 30 MIN: CPT | Mod: PBBFAC,27 | Performed by: INTERNAL MEDICINE

## 2023-04-10 PROCEDURE — 99497 PR ADVNCD CARE PLAN 30 MIN: ICD-10-PCS | Mod: S$PBB,,, | Performed by: INTERNAL MEDICINE

## 2023-04-10 PROCEDURE — 99215 OFFICE O/P EST HI 40 MIN: CPT | Mod: S$PBB,,, | Performed by: INTERNAL MEDICINE

## 2023-04-10 NOTE — PROGRESS NOTES
Subjective:         Patient ID: Tr Chen is a 68 y.o. male.    Chief Complaint: No chief complaint on file.  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 CT imaging studies negative for evidence of distant disease.  1/27/2017 PET/CT imaging  reveals   hepatic metastatic lesion . He subsequently underwent CT guided liver bx 2/3/2017 - NEG for malignancy .    status post LAR,12/27/2016.pT3 pN1cM0 Stage IIIB  He is s/p adjuvant XELOX cycle 4  Completed 4/17/2017  Followed by chemo/RT initiated  5/22/2017   He completed XRT July 6, 2017  s/p partial completion of XELODA approx 6/21/2017 ( due to ASE)    PET scan on 8/16/2018 showed continued growth of this lesion to 3.8cm. LIver lesion was biopsied again on 10/18/2018. Pathology revealed adenocarcinoma compatible with a metastatic carcinoma of colorectal origin.    Pt delayed follow up imaging ( work related issues)   PET/CT 12/18/2018 reveals  Isolated right lobe liver metastasis more metabolic from the prior study more prominent worrisome for progression and poor response to therapy.  No new lesions are seen.   Pt followed by IR   s/p ablation of liver lesion 12/31/2018   MRI abd w w/o contrast 2/14/2019 reveals interval right hepatic lobe ablation with no residual enhancement at site,no new concerning liver lesions noed   He underwent LUNG Biopsy 2/14/23 Positive for malignancy, adenocarcinoma          Interval Hx: Lost to follow up   Pt recently re established care   PET/CT 12/1/22  shows hypermetabolic thickening of the rectosigmoid anastomosis with an adjacent hypermetabolic para-rectal right pelvic mass consistent with malignancy. additional numerous hypermetabolic lesions throughout the liver and bilateral lungs worrisome for malignancy.   Incidental hypermetabolic focus in the left side of the prostate gland.  Differential considerations include focal prostatitis versus malignancy.   Similar to comparison CT abdomen pelvis 11/14/2022, the para-rectal right pelvic mass obstructs the right ureter resulting in grossly stable upstream hydroureteronephrosis    HE IS FINALLY OFFICIALLY RETIRED FROM [employer]  FEB 11TH  He delayed lung biopsy 2/2 work related issues   He underwent LUNG Biopsy 2/14/23 Positive for malignancy, adenocarcinoma   APPETITE DIMINISHED AND WT DOWN  He reports about 20 lb wt loss past 6mos  NO PAIN   No shortness of breath or chest pain  NO COUGH  No N/V   No melena, hematochezia   HE REPORTS INCREASE IN BM  He reports stools occasionally loose   No dysuria, hematuria or difficulty urinating  He continues to smoke   No fatigue  No weakness    Pt adamantly declines chemotherapy                 Prev Hx: Pt  with past medical history of colonic polyps, hypertension, tobacco abuse, seen today in consultation for  diagnosed rectosigmoid adenocarcinoma.  The patient reports intermittent rectal bleeding for the past 3 to 4 months.  He subsequently underwent a colonoscopy on 11/21/2016, which revealed polyps noted in the descending colon, sigmoid colon, and rectum and also noted for a malignant-appearing partially obstructing tumor in the distal sigmoid colon with ulceration. Pathology of the rectosigmoid mass revealed a tubulovillous adenoma and polyps benign..The patient reports he underwent a colonoscopy during his 40s for rectal bleeding.  He was diagnosed with a large bleeding polyp, for  "which he underwent removal.  He did not undergo surveillance colonoscopies as advised.  No family history of colon cancer.  CT of the abdomen and pelvis on 11/22/2016 revealed thickening of the rectal wall, findings, rectosigmoid neoplasm at the rectosigmoid junction and numerous hepatic lesions compatible with hepatic cysts. He  subsequently underwent a rectal sigmoidectomy on 12/27/2016 .  Pathology revealed an adenocarcinoma, low grade, clear margins. The patient underwent removal of 24 lymph nodes with 0 lymph nodes involved.  Evidence of tumor deposits noted with pathologic staging pT3, pN1c.            Review of Systems   Constitutional:  Positive for appetite change, fatigue and unexpected weight change. Negative for fever.   HENT:  Negative for mouth sores.    Eyes:  Negative for visual disturbance.   Respiratory:  Negative for cough and shortness of breath.    Cardiovascular:  Negative for chest pain.   Gastrointestinal:  Negative for abdominal pain and diarrhea.        Loose stools   Genitourinary:  Negative for frequency.   Musculoskeletal:  Negative for back pain.   Integumentary:  Negative for rash.   Neurological:  Negative for headaches.   Hematological:  Negative for adenopathy.   Psychiatric/Behavioral:  The patient is not nervous/anxious.        Objective:       ECOG 1  Vitals:    04/10/23 0835   BP: (!) 142/85   BP Location: Left arm   Patient Position: Sitting   BP Method: Large (Automatic)   Pulse: 107   SpO2: 97%   Weight: 69.4 kg (153 lb)   Height: 6' 3" (1.905 m)           Physical Exam   Constitutional: She is oriented to person, place, and time. She appears well-developed and well-nourished.   HENT:   Head: Normocephalic.   Mouth/Throat: Oropharynx is clear and moist. No oropharyngeal exudate.   Eyes: Conjunctivae and lids are normal. No scleral icterus.   Neck: Normal range of motion. Neck supple. No thyromegaly present.   Cardiovascular: Normal rate, regular rhythm and normal heart " sounds.    No murmur heard.  Pulmonary/Chest: Breath sounds normal. She has no wheezes. She has no rales.   Abdominal: Soft. Bowel sounds are normal. There is no tenderness. There is no rebound and no guarding.   Musculoskeletal: Normal range of motion. She exhibits no edema and no tenderness.   Lymphadenopathy:     She has no cervical adenopathy.     She has no axillary adenopathy.        Right: No supraclavicular adenopathy present.        Left: No supraclavicular adenopathy present.   Neurological: She is alert and oriented to person, place, and time. No cranial nerve deficit. Coordination normal.   Skin: Skin is warm and dry. No ecchymosis, no petechiae and no rash noted. No erythema.   Psychiatric: She has a normal mood and affect.          MRI abd w w/out contrast 2/14/2019  IMPRESSION:      Interval right hepatic lobe ablation.  No residual enhancement at this site.     No new concerning liver lesions identified, noting limited evaluation due to innumerable underlying cysts of varying sizes.  Continued CT PET imaging for future follow-up recommended.    CT c/a/p w/contrast  11/14/22   Impression:     Index left lung metastasis 8.8 x 6.7 cm.     Index liver metastasis right lobe measuring 8 x 6.4 cm.     Index right pelvic mass obstructing the right ureter 3 cm.     Index thickening right rectosigmoid junction 1.5 cm.     PET-CT scan could be helpful      PET/CT 12/1/22  Impression:     In this patient with rectosigmoid adenocarcinoma status post low anterior resection, there is hypermetabolic thickening of the rectosigmoid anastomosis with an adjacent hypermetabolic para-rectal right pelvic mass consistent with malignancy.     There are additional numerous hypermetabolic lesions throughout the liver and bilateral lungs worrisome for malignancy.     Incidental hypermetabolic focus in the left side of the prostate gland.  Differential considerations include focal prostatitis versus malignancy.     Similar to  comparison CT abdomen pelvis 11/14/2022, the para-rectal right pelvic mass obstructs the right ureter resulting in grossly stable upstream hydroureteronephrosis.       Left lung, biopsy: 2/14/23   Positive for malignancy, adenocarcinoma   Abundant tumor necrosis   Immunohistochemical stains pending for further classification of primary site.  VC       Comment: Interp By Girish Tyler MD, Signed on 02/23/2023 at 10:26   Supplemental Diagnosis     SUPPLEMENTAL #2   Immunohistochemistry (IHC) Testing for Mismatch Repair (MMR) Proteins:   MLH1 - Intact nuclear expression   MSH2 - Intact nuclear expression   MSH6 - Intact nuclear expression   PMS2 - Intact nuclear expression            Assessment:       Problem List Items Addressed This Visit          Oncology    Rectal carcinoma  with liver and pulmonary metastases  68-year-old male with diagnosed rectosigmoid adenocarcinoma, status post LAR,12/27/2016.pT3 pN1c with 0 of 24 lymph nodes involved with tumor deposits present.    CT imaging  - negative for evidence of distant disease.     PET/CT imaging studies - hepatic lesion  S/p liver bx 2/3/2017  - benign   status post LAR,12/27/2016.pT3 pN1cM0 Stage IIIB  He is s/p adjuvant XELOX cycle 4  Completed 4/17/2017  Followed by chemo/RT initiated  5/22/2017   s/p partial completion of XELODA approx 6/21/2017 ( due to ASE)   PET/CT 12/18/2018 reveals  Isolated right lobe liver metastasis more metabolic from the prior study more prominent worrisome for progression and poor response to therapy.  No new lesions are seen.   S/p ablation of liver lesion 12/31/2018   Follow-up MRI abd w w/out contrast 2/14/2019 reveals no areas of enhancement, no new liver lesions  Last colonoscopy 2020benign polyps , suboptimal prep    PET/CT 12/1/22  shows hypermetabolic thickening of the rectosigmoid anastomosis with an adjacent hypermetabolic para-rectal right pelvic mass consistent with malignancy. additional numerous hypermetabolic  lesions throughout the liver and bilateral lungs worrisome for malignancy.    LUNG Biopsy 2/14/23 Positive for malignancy, adenocarcinoma   Detailed discussion with patient regarding radiographic and pathology findings. Discussed staging and prognosis. Pt adamant he does not want to proceed with palliative chemotherapy 2/2 potential toxicities.   Tempus liquid and tissue - TUCKER BRAF neg NRAS neg KRAS neg  Patient is also not considered a candidate for ablation- d/w Dr. Abarca  Plan to request Her 2 testing   Plan Ambulatory Referral to Palliative Care   Consider Tumor Board   Began discussion regarding goals of care. Patient reports he has a living will at home    Relevant Orders    CBC Auto Differential        Secondary malignant neoplasm   to liver          Malignant neoplasm metastatic to lung                 Hydronephrosis      Renal function nl      No flank pain       Consider Referral to Urology       Discussed with Dr. Carmona regarding palliative stent-no urgent indication          Follow-up :         F/u  1 month with  CBC, CMP,CEA prior to f/u    Plan Ambulatory Referral to Urology    Consider tumor board        Advance Care Planning     Date: 04/11/2023    Power of   I initiated the process of advance care planning today and explained the importance of this process to the patient.  I introduced the concept of advance directives to the patient, as well. Then the patient received detailed information about the importance of designating a Health Care Power of  (HCPOA). He was also instructed to communicate with this person about their wishes for future healthcare, should he become sick and lose decision-making capacity. The patient has not previously appointed a HCPOA. After our discussion, the patient has decided to complete a HCPOA and has appointed his son, health care agent: I spent a total time of 16 minutes discussing this issue with the patient.He will take brochure home to  complete             I spent a total of  45 minutes on the day of the visit.This includes face to face time and non-face to face time preparing to see the patient (eg, review of tests), obtaining and/or reviewing separately obtained history, documenting clinical information in the electronic or other health record, independently interpreting results and communicating results to the patient/family/caregiver, and coordinating care.      Cc: Cornelius Bragg MD

## 2023-04-13 LAB
FINAL PATHOLOGIC DIAGNOSIS: NORMAL
FINAL PATHOLOGIC DIAGNOSIS: NORMAL
GROSS: NORMAL
GROSS: NORMAL
Lab: NORMAL
Lab: NORMAL
SUPPLEMENTAL DIAGNOSIS: NORMAL
SUPPLEMENTAL DIAGNOSIS: NORMAL

## 2023-04-19 NOTE — TELEPHONE ENCOUNTER
----- Message from Renetta Jefferson sent at 4/19/2023  7:51 AM CDT -----  Type: RX Refill Request    Who Called:  self    Have you contacted your pharmacy: no    Refill or New Rx: refill    RX Name and Strength: HYDROcodone-acetaminophen (NORCO) 5-325 mg per tablet      Preferred Pharmacy with phone number: [id number] - [place], [place] - [address] AT [facility] [address]   Phone:  [phone]  Fax:  [phone]    Local or Mail Order: local    Would the patient rather a call back or a response via My [facility]?  call    Best Call Back Number: .[phone] (home)      Additional Information:

## 2023-04-19 NOTE — TELEPHONE ENCOUNTER
No new care gaps identified.  [facility] Embedded Care Gaps. Reference number: [id number]. 4/19/2023   8:18:11 AM CDT

## 2023-04-20 ENCOUNTER — DOCUMENTATION ONLY (OUTPATIENT)
Dept: HEMATOLOGY/ONCOLOGY | Facility: CLINIC | Age: 68
End: 2023-04-20
Payer: MEDICARE

## 2023-04-20 RX ORDER — HYDROCODONE BITARTRATE AND ACETAMINOPHEN 5; 325 MG/1; MG/1
1 TABLET ORAL EVERY 6 HOURS PRN
Qty: 120 TABLET | Refills: 0 | Status: SHIPPED | OUTPATIENT
Start: 2023-06-19

## 2023-04-20 RX ORDER — HYDROCODONE BITARTRATE AND ACETAMINOPHEN 5; 325 MG/1; MG/1
1 TABLET ORAL EVERY 6 HOURS PRN
Qty: 120 TABLET | Refills: 0 | Status: SHIPPED | OUTPATIENT
Start: 2023-05-19 | End: 2023-05-11

## 2023-04-20 RX ORDER — HYDROCODONE BITARTRATE AND ACETAMINOPHEN 5; 325 MG/1; MG/1
1 TABLET ORAL EVERY 6 HOURS PRN
Qty: 120 TABLET | Refills: 0 | Status: SHIPPED | OUTPATIENT
Start: 2023-04-20 | End: 2023-05-11

## 2023-04-20 NOTE — PROGRESS NOTES
[facility]     Colorectal Liver Metastasis Tumor Board Note      Date: 04/20/2023    Case Overview: Mr. Chen (68M) was initially diagnosed with rectosigmoid adenocarcinoma in 11/2016 with metachronous liver metastases. He underwent LAR on 12/27/16 with adjuvant XELOX x 4 cycles, chemoRT, and partial completion of Xeloda in 2017 prior to ablation of liver lesion in 12/2018. Biopsy of lung nodules in 2/2023 revealed metastatic disease to lungs.     Participants: medical oncology, surgical oncology, colorectal surgery, transplant surgeons, interventional radiology, and oncology navigator     Imaging Reviewed: PET CT 12/2022    Radiology Review: PET/CT 12/1/22 demonstrates multifocal bilobar pulmonary and hepatic metastatic disease.    Orders/Referrals: Clinical Trials TB referral - placed.     Board Recommendations: Patient has previously expressed not wanting to receive chemotherapy. However, several chemotherapy options are available for this patient should he be agreeable to treatment. He is not considered a candidate for ablation. Continue with plan to request HER-2 testing. Can also evaluate for eligibility in TYR trial for PROSPER wild-type patients.

## 2023-04-26 ENCOUNTER — TUMOR BOARD CONFERENCE (OUTPATIENT)
Dept: HEMATOLOGY/ONCOLOGY | Facility: CLINIC | Age: 68
End: 2023-04-26
Payer: MEDICARE

## 2023-04-26 NOTE — Clinical Note
Dr. Jones,  Thank you for sending this patient for review at the [facility] Tumor Board. Patient is not currently eligible for any of our trials. He would need to progress on chemotherapy to be considered for our TYR trial. If he is agreeable to chemo, we would recommend FOLFOX +EGFR inhibitor. Parp inhibitor if patient does not want chemo (JACOB loss of function).    Thank You, Paris Ray, NP

## 2023-04-26 NOTE — PROGRESS NOTES
[facility] Tumor Board    Date: 4/26/2023    Patient Name: Tr Chen    MRN: [id number]    Diagnosis: Metastatic Rectal Cancer, BRAF, NRAS & KRAS WT - Diagnosed in 2016.    Referring Provider: Dr. Jones    Present PCTP Providers:     Dr. Torres Bal, Dr. José Antonio Pat, Paris Ray, NP    Patient Summary:  Pathology:    Has failed Chemotherapy  Failed chemotherapy: s/p adjuvant XELOX cycle 4 - completed 4/17/2017. Followed by chemo/RT initiated 5/22/2017. Partial completion of Xeloda in 2017 prior to ablation of liver lesion in 12/2018. Lung biopsy on 2/14/23 Positive for malignancy.    Current treatment(s):    ECOG: Restricted in physically strenuous activity but ambulatory and able to carry out work of a light or sedentary nature, e.g., light house work, office work    Molecular Workup:    Other  Other Molecular Workup: Tempus: JACOB frameshift, APC.      Board Recommendations:    Standard of care recommendations: FOLFOX +EGFR inhibitor. Parp inhibitor is he declines chemotherapy.   Trial recommendations: Late phase: no trials. Early phase: TYR - not eligible; needs to progress on chemotherapy.

## 2023-05-08 ENCOUNTER — LAB VISIT (OUTPATIENT)
Dept: LAB | Facility: HOSPITAL | Age: 68
End: 2023-05-08
Attending: INTERNAL MEDICINE
Payer: MEDICARE

## 2023-05-08 DIAGNOSIS — C20 RECTAL CARCINOMA: ICD-10-CM

## 2023-05-08 LAB
ALBUMIN SERPL BCP-MCNC: 3.1 G/DL (ref 3.5–5.2)
ALP SERPL-CCNC: 130 U/L (ref 55–135)
ALT SERPL W/O P-5'-P-CCNC: 9 U/L (ref 10–44)
ANION GAP SERPL CALC-SCNC: 9 MMOL/L (ref 8–16)
AST SERPL-CCNC: 15 U/L (ref 10–40)
BASOPHILS # BLD AUTO: 0.04 K/UL (ref 0–0.2)
BASOPHILS NFR BLD: 0.5 % (ref 0–1.9)
BILIRUB SERPL-MCNC: 0.3 MG/DL (ref 0.1–1)
BUN SERPL-MCNC: 10 MG/DL (ref 8–23)
CALCIUM SERPL-MCNC: 9.3 MG/DL (ref 8.7–10.5)
CEA SERPL-MCNC: 28.8 NG/ML (ref 0–5)
CHLORIDE SERPL-SCNC: 103 MMOL/L (ref 95–110)
CO2 SERPL-SCNC: 25 MMOL/L (ref 23–29)
CREAT SERPL-MCNC: 0.9 MG/DL (ref 0.5–1.4)
DIFFERENTIAL METHOD: ABNORMAL
EOSINOPHIL # BLD AUTO: 0.1 K/UL (ref 0–0.5)
EOSINOPHIL NFR BLD: 1.3 % (ref 0–8)
ERYTHROCYTE [DISTWIDTH] IN BLOOD BY AUTOMATED COUNT: 14.4 % (ref 11.5–14.5)
EST. GFR  (NO RACE VARIABLE): >60 ML/MIN/1.73 M^2
GLUCOSE SERPL-MCNC: 109 MG/DL (ref 70–110)
HCT VFR BLD AUTO: 35.8 % (ref 40–54)
HGB BLD-MCNC: 11.4 G/DL (ref 14–18)
IMM GRANULOCYTES # BLD AUTO: 0.05 K/UL (ref 0–0.04)
IMM GRANULOCYTES NFR BLD AUTO: 0.6 % (ref 0–0.5)
LYMPHOCYTES # BLD AUTO: 1.2 K/UL (ref 1–4.8)
LYMPHOCYTES NFR BLD: 14.7 % (ref 18–48)
MCH RBC QN AUTO: 26.5 PG (ref 27–31)
MCHC RBC AUTO-ENTMCNC: 31.8 G/DL (ref 32–36)
MCV RBC AUTO: 83 FL (ref 82–98)
MONOCYTES # BLD AUTO: 0.8 K/UL (ref 0.3–1)
MONOCYTES NFR BLD: 9.3 % (ref 4–15)
NEUTROPHILS # BLD AUTO: 6 K/UL (ref 1.8–7.7)
NEUTROPHILS NFR BLD: 73.6 % (ref 38–73)
NRBC BLD-RTO: 0 /100 WBC
PLATELET # BLD AUTO: 382 K/UL (ref 150–450)
PMV BLD AUTO: 8.2 FL (ref 9.2–12.9)
POTASSIUM SERPL-SCNC: 4.5 MMOL/L (ref 3.5–5.1)
PROT SERPL-MCNC: 7.6 G/DL (ref 6–8.4)
RBC # BLD AUTO: 4.31 M/UL (ref 4.6–6.2)
SODIUM SERPL-SCNC: 137 MMOL/L (ref 136–145)
WBC # BLD AUTO: 8.19 K/UL (ref 3.9–12.7)

## 2023-05-08 PROCEDURE — 36415 COLL VENOUS BLD VENIPUNCTURE: CPT | Performed by: INTERNAL MEDICINE

## 2023-05-08 PROCEDURE — 85025 COMPLETE CBC W/AUTO DIFF WBC: CPT | Performed by: INTERNAL MEDICINE

## 2023-05-08 PROCEDURE — 82378 CARCINOEMBRYONIC ANTIGEN: CPT | Performed by: INTERNAL MEDICINE

## 2023-05-08 PROCEDURE — 80053 COMPREHEN METABOLIC PANEL: CPT | Performed by: INTERNAL MEDICINE

## 2023-05-11 ENCOUNTER — OFFICE VISIT (OUTPATIENT)
Dept: HEMATOLOGY/ONCOLOGY | Facility: CLINIC | Age: 68
End: 2023-05-11
Payer: MEDICARE

## 2023-05-11 ENCOUNTER — OFFICE VISIT (OUTPATIENT)
Dept: PALLIATIVE MEDICINE | Facility: CLINIC | Age: 68
End: 2023-05-11
Payer: MEDICARE

## 2023-05-11 VITALS
DIASTOLIC BLOOD PRESSURE: 74 MMHG | WEIGHT: 151.69 LBS | OXYGEN SATURATION: 96 % | SYSTOLIC BLOOD PRESSURE: 130 MMHG | BODY MASS INDEX: 18.86 KG/M2 | HEART RATE: 85 BPM | HEIGHT: 75 IN

## 2023-05-11 DIAGNOSIS — R11.0 NAUSEA: ICD-10-CM

## 2023-05-11 DIAGNOSIS — C78.00 MALIGNANT NEOPLASM METASTATIC TO LUNG, UNSPECIFIED LATERALITY: ICD-10-CM

## 2023-05-11 DIAGNOSIS — C20 RECTAL CARCINOMA: Primary | ICD-10-CM

## 2023-05-11 DIAGNOSIS — N13.30 HYDRONEPHROSIS, UNSPECIFIED HYDRONEPHROSIS TYPE: ICD-10-CM

## 2023-05-11 DIAGNOSIS — Z71.89 ADVANCE CARE PLANNING: Primary | ICD-10-CM

## 2023-05-11 DIAGNOSIS — R52 PAIN MANAGEMENT: ICD-10-CM

## 2023-05-11 DIAGNOSIS — C78.7 METASTASES TO THE LIVER: ICD-10-CM

## 2023-05-11 PROCEDURE — 99211 OFF/OP EST MAY X REQ PHY/QHP: CPT | Mod: PBBFAC,27 | Performed by: NURSE PRACTITIONER

## 2023-05-11 PROCEDURE — 99999 PR PBB SHADOW E&M-EST. PATIENT-LVL IV: CPT | Mod: PBBFAC,,, | Performed by: INTERNAL MEDICINE

## 2023-05-11 PROCEDURE — 99214 OFFICE O/P EST MOD 30 MIN: CPT | Mod: PBBFAC,27 | Performed by: INTERNAL MEDICINE

## 2023-05-11 PROCEDURE — 99999 PR PBB SHADOW E&M-EST. PATIENT-LVL IV: ICD-10-PCS | Mod: PBBFAC,,, | Performed by: INTERNAL MEDICINE

## 2023-05-11 PROCEDURE — 99205 OFFICE O/P NEW HI 60 MIN: CPT | Mod: S$PBB,,, | Performed by: NURSE PRACTITIONER

## 2023-05-11 PROCEDURE — 99999 PR PBB SHADOW E&M-EST. PATIENT-LVL I: CPT | Mod: PBBFAC,,, | Performed by: NURSE PRACTITIONER

## 2023-05-11 PROCEDURE — 99999 PR PBB SHADOW E&M-EST. PATIENT-LVL I: ICD-10-PCS | Mod: PBBFAC,,, | Performed by: NURSE PRACTITIONER

## 2023-05-11 PROCEDURE — 99205 PR OFFICE/OUTPT VISIT, NEW, LEVL V, 60-74 MIN: ICD-10-PCS | Mod: S$PBB,,, | Performed by: NURSE PRACTITIONER

## 2023-05-11 PROCEDURE — 99214 PR OFFICE/OUTPT VISIT, EST, LEVL IV, 30-39 MIN: ICD-10-PCS | Mod: S$PBB,,, | Performed by: INTERNAL MEDICINE

## 2023-05-11 PROCEDURE — 99214 OFFICE O/P EST MOD 30 MIN: CPT | Mod: S$PBB,,, | Performed by: INTERNAL MEDICINE

## 2023-05-11 RX ORDER — ONDANSETRON 8 MG/1
8 TABLET, ORALLY DISINTEGRATING ORAL EVERY 8 HOURS PRN
Qty: 30 TABLET | Refills: 1 | Status: SHIPPED | OUTPATIENT
Start: 2023-05-11 | End: 2023-07-10

## 2023-05-11 NOTE — PROGRESS NOTES
Clinic Consult Note  Palliative Care      Consult Requested By: Latrice Jones  Reason for Consult: ACP/pain mgmt            SUBJECTIVE:     History of Present Illness:  Tr Chen is a 68 y.o. male  who presents to the clinic today referred by oncologist Dr jones. Patient has recurrent rectosigmoid adenocarcinoma now with metastatic disease left lung, liver but has declined going through chemo again as he felt his Qol was poor while he had chemo. He recently retired and states he would still be working if this wasn't the case. He does c/o fatigue with exertion but that he still has good days. No c/o pain today and takes hydrocodone prn infrequently which at this time does relieve. No SOB, diarrhea, constipation, n/v, anxiety, depression.       Past Medical History:  Past Medical History:   Diagnosis Date    Cancer     metastatic colon cancer    Chronic heel pain     Bilateral    Chronic pain 11/20/2012    Erectile dysfunction 5/16/2014    History of chemotherapy 11/28/2017    History of colonic polyps 7/16/2012    HTN (hypertension) 7/16/2012    Hypertension     Liver lesion     October biopsy showed liver mets    Tobacco use disorder 1/19/2015       Past Surgical History:  Past Surgical History:   Procedure Laterality Date    COLON SURGERY  12/27/2016    colon resection    COLONOSCOPY N/A 11/21/2016    Procedure: COLONOSCOPY;  Surgeon: Jono Resendez MD;  Location: [facility];  Service: Endoscopy;  Laterality: N/A;  needs scope done 2/24/16  -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     COLONOSCOPY N/A 12/27/2017    Procedure: COLONOSCOPY;  Surgeon: Jono Resendez MD;  Location: [facility];  Service: Endoscopy;  Laterality: N/A;    COLONOSCOPY N/A 12/22/2020    Procedure: COLONOSCOPY;  Surgeon: Portillo Mcclelland MD;  Location: [facility];  Service: Gastroenterology;  Laterality: N/A;  SPOKE TO CLIENT R/T PREP AND COVID TESTING (12/19/2020 @ URGENT CARE); CONFIRMED UNDERSTANDING.    FRACTURE SURGERY      both feet     LIVER BIOPSY N/A 10/18/2018    Procedure: BIOPSY, LIVER;  Surgeon: Zaire Diagnostic Provider;  Location: [facility] OR;  Service: Radiology;  Laterality: N/A;  9AM START  RN PREOP 10/12/2018    TONSILLECTOMY         Social History:  Social History     Socioeconomic History    Marital status:    Tobacco Use    Smoking status: Every Day     Packs/day: 0.25     Years: 30.00     Pack years: 7.50     Types: Cigarettes    Smokeless tobacco: Never   Substance and Sexual Activity    Alcohol use: Yes     Comment: social    Drug use: No       Family History:  Family History   Problem Relation Age of Onset    Cancer Mother         breast       Allergies and Medications (updated and reviewed):  Review of patient's allergies indicates:   Allergen Reactions    No known allergies      Medication List with Changes/Refills   Current Medications    HYDROCODONE-ACETAMINOPHEN (NORCO) 5-325 MG PER TABLET    Take 1 tablet by mouth every 6 (six) hours as needed for Pain (Chronic pain, G89.4). :MORE THAN A SEVEN DAY SUPPLY OF OPIOID IS MEDICALLY NECESSARY.    HYDROCODONE-ACETAMINOPHEN (NORCO) 5-325 MG PER TABLET    Take 1 tablet by mouth every 6 (six) hours as needed for Pain (Chronic pain, G89.4). :MORE THAN A SEVEN DAY SUPPLY OF OPIOID IS MEDICALLY NECESSARY.    INSULIN SYRINGE-NEEDLE U-100 1 ML 31 GAUGE X 5/16 SYRG    Use as directed with ICI Therapy    LACTULOSE (CHRONULAC) 10 GRAM/15 ML SOLUTION    SMARTSIG:15 Milliliter(s) By Mouth Twice Daily PRN    ONDANSETRON (ZOFRAN-ODT) 8 MG TBDL    Take 1 tablet (8 mg total) by mouth every 8 (eight) hours as needed (NAUSEA).    PAPAVERINE 30 MG/ML INJECTION    Add: Phentolamine 1 mg Add: PGE1 10 mcg Sig:  Inject 20 units as directed; increase up 5 units until desired results.    SILDENAFIL (VIAGRA) 100 MG TABLET    Take 1 tablet (100 mg total) by mouth daily as needed for Erectile Dysfunction. half -1 Tablet Oral .  Take 30 min before anticipated need.    TADALAFIL (CIALIS) 20 MG TAB     TAKE 1 TABLET BY MOUTH EVERY 36 HOURS   Discontinued Medications    HYDROCODONE-ACETAMINOPHEN (NORCO) 5-325 MG PER TABLET    Take 1 tablet by mouth every 6 (six) hours as needed for Pain.    HYDROCODONE-ACETAMINOPHEN (NORCO) 5-325 MG PER TABLET    Take 1 tablet by mouth every 6 (six) hours as needed for Pain (Chronic pain, G89.4). :MORE THAN A SEVEN DAY SUPPLY OF OPIOID IS MEDICALLY NECESSARY.    HYDROCODONE-ACETAMINOPHEN (NORCO) 5-325 MG PER TABLET    Take 1 tablet by mouth every 6 (six) hours as needed for Pain. :MORE THAN A SEVEN DAY SUPPLY OF OPIOID IS MEDICALLY NECESSARY.    HYDROCODONE-ACETAMINOPHEN (NORCO) 5-325 MG PER TABLET    Take 1 tablet by mouth every 6 (six) hours as needed for Pain (Chronic pain, G89.4). :MORE THAN A SEVEN DAY SUPPLY OF OPIOID IS MEDICALLY NECESSARY.    HYDROCODONE-ACETAMINOPHEN (NORCO) 5-325 MG PER TABLET    Take 1 tablet by mouth every 6 (six) hours as needed for Pain (Chronic pain, G89.4). :MORE THAN A SEVEN DAY SUPPLY OF OPIOID IS MEDICALLY NECESSARY.    HYDROCODONE-ACETAMINOPHEN (NORCO) 5-325 MG PER TABLET    Take 1 tablet by mouth every 6 (six) hours as needed for Pain (Chronic pain, G89.4). :MORE THAN A SEVEN DAY SUPPLY OF OPIOID IS MEDICALLY NECESSARY.               OBJECTIVE:      Review of Systems   Constitutional:  Positive for malaise/fatigue and weight loss. Negative for fever.   HENT:  Negative for congestion.    Respiratory:  Negative for cough and shortness of breath.    Cardiovascular:  Negative for chest pain.   Gastrointestinal:  Negative for constipation, diarrhea, nausea and vomiting.   Musculoskeletal:  Positive for joint pain and myalgias.   Neurological:  Positive for weakness.          Physical Exam   Constitutional: He is oriented to person, place, and time.  Non-toxic appearance. He appears ill. No distress.   HENT:   Head: Normocephalic and atraumatic.   Right Ear: External ear normal.   Left Ear: External ear normal.   Nose: Nose normal.   Eyes: Right eye  exhibits no discharge. Left eye exhibits no discharge.   Cardiovascular: Normal rate and regular rhythm.   Pulmonary/Chest: Effort normal.   Abdominal: Soft.   Musculoskeletal:      Right lower leg: No edema.      Left lower leg: No edema.   Neurological: He is alert and oriented to person, place, and time.   Skin: Skin is warm and dry. He is not diaphoretic.   Psychiatric: His behavior is normal. Mood normal.   Nursing note and vitals reviewed.    Review of Symptoms      Symptom Assessment (ESAS 0-10 Scale)  Pain:  0  Dyspnea:  0  Anxiety:  0  Nausea:  0  Depression:  0  Anorexia:  0  Fatigue:  0  Insomnia:  0  Restlessness:  0  Agitation:  0       Constipation:  No constipation    Psychosocial/Cultural:   See Palliative Psychosocial Note: No  **Primary  to Follow**  Palliative Care  Consult: No    Advance Directives:   Medical Power of : Yes    Goals of Care: The patient endorses that what is most important right now is to focus on remaining as independent as possible and quality of life, even if it means sacrificing a little time    Accordingly, we have decided that the best plan to meet the patient's goals includes no chemo and only palliative measures                          Performance Status: 70    ECOG Performance Status Grade: 1 - Ambulates, capable of light work          Plan/Recommendations:    1. Advance care planning  -patient has HPO[facility] in [facility]; no changes  -he will forego chemo tx again as he felt QOL was poor with chemo and he still has good days; he'd rather sacrifice quantity for quality   -hospice discussed as option; he will continue to see me while he is able and will consider hospice at a later date    2. Pain management  -continue current pain regimen hydrocodone 5/325 take one tablet q 6 hours prn  -call for refills  -f/u prn    3. Opioid constipation  -Has lactulose and stool softeners at home for prn use but infrequently needs  -No c/o constipation      60  min total time spent in encounter, which includes face to face and nonface to face time preparing to see the patient, review of tests, labs, obtaining and reviewing separately obtained history, symptom assessment,  documenting clinical information in the electronic medical record, independently interpreting results and communicating results to patient/family

## 2023-05-11 NOTE — PROGRESS NOTES
Subjective:         Patient ID: Tr Chen is a 68 y.o. male.    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 CT imaging studies negative for evidence of distant disease.  1/27/2017 PET/CT imaging  reveals   hepatic metastatic lesion . He subsequently underwent CT guided liver bx 2/3/2017 - NEG for malignancy .    status post LAR,12/27/2016.pT3 pN1cM0 Stage IIIB  He is s/p adjuvant XELOX cycle 4  Completed 4/17/2017  Followed by chemo/RT initiated  5/22/2017   He completed XRT July 6, 2017  s/p partial completion of XELODA approx 6/21/2017 ( due to ASE)    PET scan on 8/16/2018 showed continued growth of this lesion to 3.8cm. LIver lesion was biopsied again on 10/18/2018. Pathology revealed adenocarcinoma compatible with a metastatic carcinoma of colorectal origin.    Pt delayed follow up imaging ( work related issues)   PET/CT 12/18/2018 reveals  Isolated right lobe liver metastasis more metabolic from the prior study more prominent worrisome for progression and poor response to therapy.  No new lesions are seen.   Pt followed by IR   s/p ablation of liver lesion 12/31/2018   MRI abd w w/o contrast 2/14/2019 reveals interval right hepatic lobe ablation with no residual enhancement at site,no new concerning liver lesions noed   He underwent LUNG Biopsy 2/14/23 Positive for malignancy, adenocarcinoma         Interval Hx: Lost  "to follow up   Pt recently re established care   PET/CT 12/1/22  shows hypermetabolic thickening of the rectosigmoid anastomosis with an adjacent hypermetabolic para-rectal right pelvic mass consistent with malignancy. additional numerous hypermetabolic lesions throughout the liver and bilateral lungs worrisome for malignancy.   Incidental hypermetabolic focus in the left side of the prostate gland.  Differential considerations include focal prostatitis versus malignancy.   Similar to comparison CT abdomen pelvis 11/14/2022, the para-rectal right pelvic mass obstructs the right ureter resulting in grossly stable upstream hydroureteronephrosis    HE IS FINALLY OFFICIALLY RETIRED FROM [employer]  FEB 11TH  He delayed lung biopsy 2/2 work related issues   He underwent LUNG Biopsy 2/14/23 Positive for malignancy, adenocarcinoma   APPETITE DIMINISHED AND WT DOWN  He reports about 20 lb wt loss past 6mos  NO PAIN   He REPORTS TASTE ALTERATIONS  NO URINARY URGENCY  No dysuria, hematuria or difficulty urinating  HE REPORTS OCCASIONAL FECAL URGENCY  No shortness of breath or chest pain  NO COUGH  No N/V   No melena, hematochezia   He continues to smoke   No fatigue  No weakness       PT ADAMANTLY DECLINES CHEMOTHERAPY  He will be meet with palliative care today   HE ELECTS PALLIATIVE MEASURES    HE REPORTS,  " THE DAYS I HAVE LEFT I WANT THEM TO BE GOOD DAYS."        Prev Hx: Pt  with past medical history of colonic polyps, hypertension, tobacco abuse, seen today in consultation for  diagnosed rectosigmoid adenocarcinoma.  The patient reports intermittent rectal bleeding for the past 3 to 4 months.  He subsequently underwent a colonoscopy on 11/21/2016, which revealed polyps noted in the descending colon, sigmoid colon, and rectum and also noted for a malignant-appearing partially obstructing tumor in the distal sigmoid colon with ulceration. Pathology of the rectosigmoid mass revealed a tubulovillous adenoma and polyps " "benign..The patient reports he underwent a colonoscopy during his 40s for rectal bleeding.  He was diagnosed with a large bleeding polyp, for which he underwent removal.  He did not undergo surveillance colonoscopies as advised.  No family history of colon cancer.  CT of the abdomen and pelvis on 11/22/2016 revealed thickening of the rectal wall, findings, rectosigmoid neoplasm at the rectosigmoid junction and numerous hepatic lesions compatible with hepatic cysts. He  subsequently underwent a rectal sigmoidectomy on 12/27/2016 .  Pathology revealed an adenocarcinoma, low grade, clear margins. The patient underwent removal of 24 lymph nodes with 0 lymph nodes involved.  Evidence of tumor deposits noted with pathologic staging pT3, pN1c.            Review of Systems   Constitutional:  Positive for appetite change, fatigue and unexpected weight change. Negative for fever.   HENT:  Negative for mouth sores.    Eyes:  Negative for visual disturbance.   Respiratory:  Negative for cough and shortness of breath.    Cardiovascular:  Negative for chest pain.   Gastrointestinal:  Negative for abdominal pain and diarrhea.        Loose stools   Genitourinary:  Negative for frequency.   Musculoskeletal:  Negative for back pain.   Integumentary:  Negative for rash.   Neurological:  Negative for headaches.   Hematological:  Negative for adenopathy.   Psychiatric/Behavioral:  The patient is not nervous/anxious.        Objective:       ECOG 1  Vitals:    05/11/23 1304   BP: 130/74   BP Location: Left arm   Patient Position: Sitting   BP Method: Large (Automatic)   Pulse: 85   SpO2: 96%   Weight: 68.8 kg (151 lb 10.8 oz)   Height: 6' 3" (1.905 m)           Physical Exam   Constitutional: She is oriented to person, place, and time. She appears well-developed and well-nourished.   HENT:   Head: Normocephalic.   Mouth/Throat: Oropharynx is clear and moist. No oropharyngeal exudate.   Eyes: Conjunctivae and lids are normal. No scleral " icterus.   Neck: Normal range of motion. Neck supple. No thyromegaly present.   Cardiovascular: Normal rate, regular rhythm and normal heart sounds.    No murmur heard.  Pulmonary/Chest: Breath sounds normal. She has no wheezes. She has no rales.   Abdominal: Soft. Bowel sounds are normal. There is no tenderness. There is no rebound and no guarding.   Musculoskeletal: Normal range of motion. She exhibits no edema and no tenderness.   Lymphadenopathy:     She has no cervical adenopathy.     She has no axillary adenopathy.        Right: No supraclavicular adenopathy present.        Left: No supraclavicular adenopathy present.   Neurological: She is alert and oriented to person, place, and time. No cranial nerve deficit. Coordination normal.   Skin: Skin is warm and dry. No ecchymosis, no petechiae and no rash noted. No erythema.   Psychiatric: She has a normal mood and affect.          MRI abd w w/out contrast 2/14/2019  IMPRESSION:      Interval right hepatic lobe ablation.  No residual enhancement at this site.     No new concerning liver lesions identified, noting limited evaluation due to innumerable underlying cysts of varying sizes.  Continued CT PET imaging for future follow-up recommended.    CT c/a/p w/contrast  11/14/22   Impression:     Index left lung metastasis 8.8 x 6.7 cm.     Index liver metastasis right lobe measuring 8 x 6.4 cm.     Index right pelvic mass obstructing the right ureter 3 cm.     Index thickening right rectosigmoid junction 1.5 cm.     PET-CT scan could be helpful      PET/CT 12/1/22  Impression:     In this patient with rectosigmoid adenocarcinoma status post low anterior resection, there is hypermetabolic thickening of the rectosigmoid anastomosis with an adjacent hypermetabolic para-rectal right pelvic mass consistent with malignancy.     There are additional numerous hypermetabolic lesions throughout the liver and bilateral lungs worrisome for malignancy.     Incidental  hypermetabolic focus in the left side of the prostate gland.  Differential considerations include focal prostatitis versus malignancy.     Similar to comparison CT abdomen pelvis 11/14/2022, the para-rectal right pelvic mass obstructs the right ureter resulting in grossly stable upstream hydroureteronephrosis.       Left lung, biopsy: 2/14/23   Positive for malignancy, adenocarcinoma   Abundant tumor necrosis   Immunohistochemical stains pending for further classification of primary site.  VC       Comment: Interp By Girish Tyler MD, Signed on 02/23/2023 at 10:26   Supplemental Diagnosis     SUPPLEMENTAL #2   Immunohistochemistry (IHC) Testing for Mismatch Repair (MMR) Proteins:   MLH1 - Intact nuclear expression   MSH2 - Intact nuclear expression   MSH6 - Intact nuclear expression   PMS2 - Intact nuclear expression            Assessment:       Problem List Items Addressed This Visit          Oncology    Rectal carcinoma  with liver and pulmonary metastases  68-year-old male with diagnosed rectosigmoid adenocarcinoma, status post LAR,12/27/2016.pT3 pN1c with 0 of 24 lymph nodes involved with tumor deposits present.    CT imaging  - negative for evidence of distant disease.     PET/CT imaging studies - hepatic lesion  S/p liver bx 2/3/2017  - benign   status post LAR,12/27/2016.pT3 pN1cM0 Stage IIIB  He is s/p adjuvant XELOX cycle 4  Completed 4/17/2017  Followed by chemo/RT initiated  5/22/2017   s/p partial completion of XELODA approx 6/21/2017 ( due to ASE)   PET/CT 12/18/2018 reveals  Isolated right lobe liver metastasis more metabolic from the prior study more prominent worrisome for progression and poor response to therapy.  No new lesions are seen.   S/p ablation of liver lesion 12/31/2018   Follow-up MRI abd w w/out contrast 2/14/2019 reveals no areas of enhancement, no new liver lesions  Last colonoscopy 2020benign polyps , suboptimal prep    Pt PRESENTED AT PRECISION TUMOR BOARD  Other  Other Molecular  Workup: [facility]: JACOB frameshift, APC.    Board Recommendations:     Standard of care recommendations: FOLFOX +EGFR inhibitor. Parp inhibitor is he declines chemotherapy.   Trial recommendations: Late phase: no trials. Early phase: TYR - not eligible; needs to progress on chemotherapy.    DISCUSSED PARP INHIBITOR- PT DECLINES DUE TO CONCERN OF ASE  DISCUSSED POTENTIAL TOXICITIES FOLLOWING DETAILED DISCUSSION. PT     PET/CT 12/1/22  shows hypermetabolic thickening of the rectosigmoid anastomosis with an adjacent hypermetabolic para-rectal right pelvic mass consistent with malignancy. additional numerous hypermetabolic lesions throughout the liver and bilateral lungs worrisome for malignancy.    LUNG Biopsy 2/14/23 Positive for malignancy, adenocarcinoma   Detailed discussion with patient regarding radiographic and pathology findings. Discussed staging and prognosis. Pt adamant he does not want to proceed with palliative chemotherapy 2/2 potential toxicities.   [facility] liquid and tissue - TUCKER BRAF neg NRAS neg KRAS neg  Patient is also not considered a candidate for ablation- d/w Dr. Abarca    Pt PRESENTED AT PRECISION TUMOR BOARD  Other  Other Molecular Workup: [employer]: JACOB burke, APC.    Board Recommendations:     Standard of care recommendations: FOLFOX +EGFR inhibitor. Parp inhibitor is he declines chemotherapy.   Trial recommendations: Late phase: no trials. Early phase: TYR - not eligible; needs to progress on chemotherapy.    DISCUSSED PARP INHIBITOR- PT DECLINES DUE TO CONCERN OF ASE  DISCUSSED POTENTIAL TOXICITIES FOLLOWING DETAILED DISCUSSION.   HE DECLINES THERAPY      Follow up with Palliative Care TODAY     Began discussion regarding goals of care. Patient reports he has a living will at home    Relevant Orders    CBC Auto Differential        Secondary malignant neoplasm   to liver          Malignant neoplasm metastatic to lung                 Hydronephrosis      Renal function nl      No flank pain        Consider Referral to Urology       Discussed with Dr. Carmona regarding palliative stent-no urgent indication          Follow-up :           CBC,CMP, CEA PRIOR TO F/U MID July   PRINT AVS - PT HANDOUT ON OLAPARIB    RX          Advance Care Planning     Date: 04/11/2023    Power of   I initiated the process of advance care planning today and explained the importance of this process to the patient.  I introduced the concept of advance directives to the patient, as well. Then the patient received detailed information about the importance of designating a Health Care Power of  (HCPOA). He was also instructed to communicate with this person about their wishes for future healthcare, should he become sick and lose decision-making capacity. The patient has not previously appointed a HCPOA. After our discussion, the patient has decided to complete a HCPOA and has appointed his son, health care agent: I spent a total time of 16 minutes discussing this issue with the patient.He will take brochure home to complete              Cc: Cronelius Bragg MD

## 2023-05-12 DIAGNOSIS — C20 RECTAL CARCINOMA: Primary | ICD-10-CM

## 2023-05-12 DIAGNOSIS — C78.00 MALIGNANT NEOPLASM METASTATIC TO LUNG, UNSPECIFIED LATERALITY: ICD-10-CM

## 2023-05-15 ENCOUNTER — OFFICE VISIT (OUTPATIENT)
Dept: FAMILY MEDICINE | Facility: CLINIC | Age: 68
End: 2023-05-15
Payer: MEDICARE

## 2023-05-15 VITALS
WEIGHT: 152.56 LBS | DIASTOLIC BLOOD PRESSURE: 66 MMHG | SYSTOLIC BLOOD PRESSURE: 118 MMHG | TEMPERATURE: 98 F | BODY MASS INDEX: 18.97 KG/M2 | OXYGEN SATURATION: 96 % | HEIGHT: 75 IN | HEART RATE: 84 BPM

## 2023-05-15 DIAGNOSIS — R63.0 ANOREXIA: ICD-10-CM

## 2023-05-15 DIAGNOSIS — N52.9 ERECTILE DYSFUNCTION, UNSPECIFIED ERECTILE DYSFUNCTION TYPE: ICD-10-CM

## 2023-05-15 DIAGNOSIS — I10 ESSENTIAL HYPERTENSION: ICD-10-CM

## 2023-05-15 DIAGNOSIS — C78.7 METASTATIC COLON CANCER TO LIVER: ICD-10-CM

## 2023-05-15 DIAGNOSIS — C18.9 METASTATIC COLON CANCER TO LIVER: ICD-10-CM

## 2023-05-15 DIAGNOSIS — C78.00 MALIGNANT NEOPLASM METASTATIC TO LUNG, UNSPECIFIED LATERALITY: ICD-10-CM

## 2023-05-15 DIAGNOSIS — F11.20 OPIATE DEPENDENCE, CONTINUOUS: ICD-10-CM

## 2023-05-15 DIAGNOSIS — G89.29 CHRONIC PAIN OF BOTH ANKLES: ICD-10-CM

## 2023-05-15 DIAGNOSIS — F17.200 TOBACCO USE DISORDER: ICD-10-CM

## 2023-05-15 DIAGNOSIS — G47.00 INSOMNIA, UNSPECIFIED TYPE: ICD-10-CM

## 2023-05-15 DIAGNOSIS — M25.572 CHRONIC PAIN OF BOTH ANKLES: ICD-10-CM

## 2023-05-15 DIAGNOSIS — G89.3 CANCER RELATED PAIN: ICD-10-CM

## 2023-05-15 DIAGNOSIS — M25.571 CHRONIC PAIN OF BOTH ANKLES: ICD-10-CM

## 2023-05-15 DIAGNOSIS — G89.29 OTHER CHRONIC PAIN: Primary | ICD-10-CM

## 2023-05-15 PROCEDURE — 99213 OFFICE O/P EST LOW 20 MIN: CPT | Mod: PBBFAC,PO | Performed by: INTERNAL MEDICINE

## 2023-05-15 PROCEDURE — 99999 PR PBB SHADOW E&M-EST. PATIENT-LVL III: ICD-10-PCS | Mod: PBBFAC,,, | Performed by: INTERNAL MEDICINE

## 2023-05-15 PROCEDURE — 99215 PR OFFICE/OUTPT VISIT, EST, LEVL V, 40-54 MIN: ICD-10-PCS | Mod: S$PBB,,, | Performed by: INTERNAL MEDICINE

## 2023-05-15 PROCEDURE — 99215 OFFICE O/P EST HI 40 MIN: CPT | Mod: S$PBB,,, | Performed by: INTERNAL MEDICINE

## 2023-05-15 PROCEDURE — 99999 PR PBB SHADOW E&M-EST. PATIENT-LVL III: CPT | Mod: PBBFAC,,, | Performed by: INTERNAL MEDICINE

## 2023-05-15 RX ORDER — HYDROCODONE BITARTRATE AND ACETAMINOPHEN 5; 325 MG/1; MG/1
1 TABLET ORAL EVERY 6 HOURS PRN
Qty: 120 TABLET | Refills: 0 | Status: SHIPPED | OUTPATIENT
Start: 2023-06-15

## 2023-05-15 RX ORDER — HYDROCODONE BITARTRATE AND ACETAMINOPHEN 5; 325 MG/1; MG/1
1 TABLET ORAL EVERY 6 HOURS PRN
Qty: 120 TABLET | Refills: 0 | Status: SHIPPED | OUTPATIENT
Start: 2023-05-15

## 2023-05-15 RX ORDER — HYDROCODONE BITARTRATE AND ACETAMINOPHEN 5; 325 MG/1; MG/1
1 TABLET ORAL EVERY 6 HOURS PRN
Qty: 120 TABLET | Refills: 0 | Status: SHIPPED | OUTPATIENT
Start: 2023-07-15

## 2023-05-15 RX ORDER — MIRTAZAPINE 15 MG/1
TABLET, FILM COATED ORAL
Qty: 60 TABLET | Refills: 11 | Status: SHIPPED | OUTPATIENT
Start: 2023-05-15

## 2023-05-15 NOTE — PROGRESS NOTES
Chief complaint follow-up on pain medications and discussed follow-up on cancer    68-year-old white male .  Discussed at length the interval diagnosis of metastatic cancer.  Discussed that it is highly likely this is colon cancer based on the findings in the colon and Oncology apparently wants to do a liver biopsy.  We discussed that it is probably indicated in order to direct treatment and make sure that the liver metastases is not a primary liver cancer or metastatic lung cancer.  We discussed that treatment can be based upon biopsy results now.  I did discuss with him that any treatment would not be curable at this point but to extend the time of his life.  He does have the biopsy set for early February and he is willing to follow through on that as I am in agreement with that plan.  He is losing weight.  We did discuss that he had hydronephrosis on one side and it has been about a month and a half so we need to reassess his kidney function today.  No signs of overload or any urinary symptoms.  We discussed he might need to see Urology if there is obstruction and loss of kidney function.  He is not too interested in that obviously.  He was looking forward to residential we discussed that obviously he needs to get this treated or least establish a plan of action.  We discussed increasing calorie intake and explained him that his weight loss likely has been increased catabolism fighting off the cancer.    Still not much appetite although weight stable from last week.  He is also not sleeping that well so we discussed Remeron 15 or 30 mg or even 45.  Is smoking some marijuana and admits to that is something he has not done 40 years but I would probably agree with that for palliative purposes, improving appetite and possibly even helping with sleep.  He does not use much excessively     No increase in pain and actually taking a little bit less pain medication now that he is not working but if indeed general cancer  related pain or other pain increases from a quality of life standpoint he says he might want to request an increase in the medicine.  We will address that as it comes up     Some nausea and he has been given nausea medication by Oncology.  He might need to assess if he gets more nauseated on empty stomach and will try to get him eating again.  1. Advance care planning  -patient has HPOA in Epic; no changes  -he will forego chemo tx again as he felt QOL was poor with chemo and he still has good days; he'd rather sacrifice quantity for quality   -hospice discussed as option; he will continue to see me while he is able and will consider hospice at a later date      Counseled at length.  Chronic pains been about the same and will fill his prescriptions.                Extensive time counseling, doing controlled substance management and physician himself road several work notes and summary letters. Over 70 min  minutes of total time spent on the encounter, which includes face to face time and non-face to face time preparing to see the patient (eg, review of tests), Obtaining and/or reviewing separately obtained history, Documenting clinical information in the electronic or other health record, Independently interpreting results (not separately reported) and communicating results to the patient/family/caregiver, or Care coordination (not separately reported).     ROS:   CONST: weight stable. EYES: no vision change. ENT: no sore throat. CV: no chest pain w/ exertion. RESP: no shortness of breath. GI: no nausea, vomiting, diarrhea. No dysphagia. : no urinary issues. MUSCULOSKELETAL: no new myalgias or arthralgias. SKIN: no new changes. NEURO: no focal deficits. PSYCH: no new issues. ENDOCRINE: no polyuria. HEME: no lymph nodes. ALLERGY: no general pruritis.    PAST MEDICAL HISTORY:   1. ED.   2. HTN.   3. Smoker.   4. Allergic rhinitis with exposure to mold   5. Frequent bronchitis   6. Lumbar strain due to work on  [employer]   7. Large colon polyp removed before age 50 - Colon 1220 w polyps and fair prep -rec 6 mo repeat  8.  Chronic pain referable to ankle arthritis   9.  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 One younger brother with no medical problems       Gen: no distress  Exam otherwise deferred    Diagnoses and all orders for this visit:    Other chronic pain, high risk medications with high risk of complications and side effects address today but he is using it well and appropriately.  Pain medications this point will also serve a palliative purpose for the control of any chronic pain, cancer related pain and so forth.  Discussed as we increase pain medication there could be an increased risk of constipation that he is well aware of.    Metastatic colon cancer to liver    Cancer related pain    Essential hypertension, chronic and stable    Opiate dependence, continuous    Tobacco use disorder    Malignant neoplasm metastatic to lung, unspecified laterality    Chronic pain of both ankles    Erectile dysfunction, unspecified erectile dysfunction type    Insomnia, unspecified type    Anorexia/insomnia, trial of Remeron and escalate dose    Other orders  -     mirtazapine (REMERON) 15 MG tablet; 1-2 HS  -     HYDROcodone-acetaminophen (NORCO) 5-325 mg per tablet; Take 1 tablet by mouth every 6 (six) hours as needed for Pain (Chronic pain, G89.4). :MORE THAN A SEVEN DAY SUPPLY OF OPIOID IS MEDICALLY NECESSARY.  -     HYDROcodone-acetaminophen (NORCO) 5-325 mg per tablet; Take 1 tablet by mouth every 6 (six) hours as needed for Pain (Chronic pain, G89.4). :MORE THAN A SEVEN DAY SUPPLY OF OPIOID IS MEDICALLY NECESSARY.  -     HYDROcodone-acetaminophen (NORCO) 5-325 mg per tablet; Take 1 tablet by mouth every 6  (six) hours as needed for Pain (Chronic pain, G89.4). :MORE THAN A SEVEN DAY SUPPLY OF OPIOID IS MEDICALLY NECESSARY.

## 2023-07-07 ENCOUNTER — TELEPHONE (OUTPATIENT)
Dept: FAMILY MEDICINE | Facility: CLINIC | Age: 68
End: 2023-07-07
Payer: MEDICARE

## 2023-07-07 DIAGNOSIS — C78.7 METASTATIC COLON CANCER TO LIVER: Primary | ICD-10-CM

## 2023-07-07 DIAGNOSIS — C18.9 METASTATIC COLON CANCER TO LIVER: Primary | ICD-10-CM

## 2023-07-07 NOTE — TELEPHONE ENCOUNTER
----- Message from Maddie Morales sent at 7/7/2023  8:29 AM CDT -----  .Type: Patient Call Back    Who called: Self     What is the request in detail: Body is changing due to colon cancer. Would like to know do he need to call Hospice. Doesn't want to die in hospital would like to die at home. Say's he has some questions     Can the clinic reply by [facility]? No     Would the patient rather a call back or a response via [facility]? Call Back     Best call back number: .[phone] (home)       Additional Information:

## 2023-07-07 NOTE — TELEPHONE ENCOUNTER
Patient stated that he was urinating blood on yesterday felt like he passed something.  He would like to be placed with Hospice due to his colon cancer, he no longer want to go to the hospital he wants to pass at home and would like to know his next steps with getting into hospice.

## 2023-07-07 NOTE — TELEPHONE ENCOUNTER
Let patient know Dr. Bragg says he agrees with the plan and he will send an order over to [facility] to have them contact you and meet with you.  She that is a very good hospice agency.    With regards to the blood in the urine, sounds like you might have passed a kidney stone.        Please print my last note, perhaps this phone message and the order for hospice and send it was to [facility] and also give them a call      Send face sheet with his phone numbers and so forth

## 2023-07-07 NOTE — TELEPHONE ENCOUNTER
Paperwork faxed over to [facility]. Pt told and awaiting their call. Pt also says urine is normal now like nothing happen.

## 2023-07-13 ENCOUNTER — TELEPHONE (OUTPATIENT)
Dept: HEMATOLOGY/ONCOLOGY | Facility: CLINIC | Age: 68
End: 2023-07-13
Payer: MEDICARE

## 2023-07-13 NOTE — TELEPHONE ENCOUNTER
Patient called and stated that he met with hospice today and will be continuing with hospice. Does patient still need appointment for Wednesday with dr. goyal. He states he doesn't know if he will be able to make it.

## 2023-07-13 NOTE — TELEPHONE ENCOUNTER
"Patient called and stated that he has a appointment next week with dr. Jones and he wants to know if we can get his blood if its not drawn at the hospital. Patient states dr. Bragg has hospice coming out to his out today to discuss. Patient is confused on what hospice entails. Attempted to explain hospice is end of life care. Patient will call this afternoon to let us know decision. He states, "I'm scared that I will stink up my place for a week if I die." Will wait for patient call.   "

## 2023-10-18 NOTE — TELEPHONE ENCOUNTER
Please call with lab results    Dr. CAMPBELL says the labs generally look good.    Something new is that the B12 level is low so he wants you to start some over-the-counter B12 which is usually 1000 mcg a day and we can redo labs at your next three month appointment.    The CEA cancer test is about the same as before which is good going up only slightly from 6.9-7.1 which is about the same.    Kidney, liver, electrolytes, cholesterol and blood counts are all perfect   Tazorac Counseling:  Patient advised that medication is irritating and drying.  Patient may need to apply sparingly and wash off after an hour before eventually leaving it on overnight.  The patient verbalized understanding of the proper use and possible adverse effects of tazorac.  All of the patient's questions and concerns were addressed.

## 2023-11-06 ENCOUNTER — PATIENT MESSAGE (OUTPATIENT)
Dept: HEMATOLOGY/ONCOLOGY | Facility: CLINIC | Age: 68
End: 2023-11-06
Payer: MEDICARE

## 2023-11-06 ENCOUNTER — DOCUMENTATION ONLY (OUTPATIENT)
Dept: HEMATOLOGY/ONCOLOGY | Facility: CLINIC | Age: 68
End: 2023-11-06

## 2023-11-06 NOTE — PROGRESS NOTES
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 The [facility] results and the patient's chart were reviewed by a Cancer Genetics provider. Based on the review, the variant is suspicious for being of germline origin and confirmatory germline testing is indicated.     We have requested a referral to Cancer Genetics from Dr. Jones and a message will be sent to the patient explaining the reason for the referral. Since he has no upcoming appointments, the visit could be done virtually and we can ship a saliva kit to his home.     Sharon Tafoya PA-C  Cancer Genetics  Hereditary/High Risk Clinic  Department of Hematology and Oncology  [facility]

## 2024-10-23 DIAGNOSIS — I10 HTN (HYPERTENSION): ICD-10-CM

## 2024-11-13 ENCOUNTER — PATIENT MESSAGE (OUTPATIENT)
Dept: ADMINISTRATIVE | Facility: HOSPITAL | Age: 69
End: 2024-11-13
Payer: MEDICARE

## 2025-02-25 NOTE — PROGRESS NOTES
Problem: Pain  Goal: Verbalizes/displays adequate comfort level or baseline comfort level  Outcome: Progressing     Problem: Safety - Adult  Goal: Free from fall injury  Outcome: Progressing     Problem: Respiratory - Adult  Goal: Achieves optimal ventilation and oxygenation  Outcome: Progressing     Problem: Cardiovascular - Adult  Goal: Maintains optimal cardiac output and hemodynamic stability  Outcome: Progressing    Problem: Infection - Adult  Goal: Absence of infection during hospitalization  Outcome: Progressing  Flowsheets (Taken 2/24/2025 2236 by Gina White, RN)  Absence of infection during hospitalization:   Assess and monitor for signs and symptoms of infection   Monitor lab/diagnostic results     Problem: Infection - Adult  Goal: Absence of fever/infection during anticipated neutropenic period  Outcome: Progressing  Flowsheets (Taken 2/24/2025 2236 by Gina White, RN)  Absence of fever/infection during anticipated neutropenic period: Monitor white blood cell count     Problem: Cardiovascular - Adult  Goal: Absence of cardiac dysrhythmias or at baseline  Outcome: Progressing    Problem: Gastrointestinal - Adult  Goal: Maintains or returns to baseline bowel function  Outcome: Progressing           Subjective:       Patient ID: Tr Chen is a 62 y.o. male.    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 CT imaging studies negative for evidence of distant disease.  1/27/2017 PET/CT imaging  reveals   hepatic metastatic lesion . He subsequently underwent CT guided liver bx 2/3/2017 - NEG for malignancy .     status post LAR,12/27/2016.pT3 pN1cM0 Stage IIIB  He is s/p adjuvant XELOX cycle 4  Completed 4/17/2017  Followed by chemo/RT initiated  5/22/2017   He completed XRT July 6, 2017  s/p partial completion of XELODA approx 6/21/2017 ( due to ASE)     CT a/p w/contrast 5/17/2017- no disease recurrence, numerous cystic lesions    Today, he is doing much better   Pt is hoping to go back to work  Rectal pain resolved  Pt reports increased energy level   No longer has diarrhea  No SOB/CP  No N/V/abd pain  He no longer requires pain meds  He would like to return back to work       CT a/p w/contrast 11/20/2017- no evid of met disease or recurrence    Prev Hx: Pt  with past medical history of colonic polyps, hypertension, tobacco abuse, seen today in consultation for recently diagnosed rectosigmoid adenocarcinoma.  The patient reports intermittent rectal bleeding for the past 3 to 4 months.  He subsequently underwent a colonoscopy on 11/21/2016, which revealed polyps noted in the descending colon, sigmoid colon, and rectum and also noted for a malignant-appearing partially obstructing tumor in the distal sigmoid colon with ulceration. Pathology of the rectosigmoid mass revealed a tubulovillous adenoma and polyps benign..The patient reports he underwent a colonoscopy during his 40s for rectal bleeding.  He was diagnosed with a large bleeding polyp, for which he underwent removal.  He did not  undergo surveillance colonoscopies as advised.  No family history of colon cancer.  CT of the abdomen and pelvis on 2016 revealed thickening of the rectal wall, findings, rectosigmoid neoplasm at the rectosigmoid junction and numerous hepatic lesions compatible with hepatic cysts. He  subsequently underwent a rectal sigmoidectomy on 2016 .  Pathology revealed an adenocarcinoma, low grade, clear margins. The patient underwent removal of 24 lymph nodes with 0 lymph nodes involved.  Evidence of tumor deposits noted with pathologic staging pT3, pN1c.      PAST MEDICAL HISTORY:  Erectile dysfunction, colonic polyps, hypertension, tobacco abuse disorder, chronic pain.    PAST SURGICAL HISTORY:  As above.    SOCIAL HISTORY:  He is a former smoker.  He has a greater than 40-pack-year.  He drinks socially.  He is employed as an  for [employer]  .    FAMILY HISTORY:  Mom diagnosed with breast cancer at age 61.  Dad  at age 87 from Alzheimer's.  He has one sibling, brother in overall good health.    Therapy:  s/p Xeloda  1500mg bid Xeloda 2 wks on 1 wk off / Oxaliplatin 130mg/m2 Day 1 q 21 d  X 4 completed 2017     S/p partial completion of concurrent chemo/RT completed       Review of Systems   Constitutional: Negative for appetite change, fatigue, fever and unexpected weight change.   HENT: Negative for mouth sores and nosebleeds.    Eyes: Negative for visual disturbance.   Respiratory: Negative for cough and shortness of breath.    Cardiovascular: Negative for chest pain and leg swelling.   Gastrointestinal: Negative for abdominal pain, blood in stool, constipation, diarrhea, nausea and vomiting.   Genitourinary: Negative for frequency and hematuria.   Musculoskeletal: Negative for arthralgias and back pain.   Skin: Negative for rash.   Neurological: Negative for dizziness, weakness, light-headedness and headaches.   Hematological: Negative for adenopathy.      "  Objective:       Vitals:    11/28/17 0907   BP: (!) 156/86   BP Location: Right arm   Patient Position: Sitting   BP Method: Medium (Automatic)   Pulse: 75   Temp: 97.9 °F (36.6 °C)   TempSrc: Oral   SpO2: 98%   Weight: 88.2 kg (194 lb 7.1 oz)   Height: 6' 3" (1.905 m)       Physical Exam   Constitutional: He is oriented to person, place, and time. He appears well-developed and well-nourished.   HENT:   Head: Normocephalic.   Mouth/Throat: Oropharynx is clear and moist. No oropharyngeal exudate.   Eyes: Conjunctivae are normal. No scleral icterus.   Neck: Normal range of motion. Neck supple. No thyromegaly present.   Cardiovascular: Normal rate, regular rhythm and normal heart sounds.    No murmur heard.  Pulmonary/Chest: Effort normal and breath sounds normal. He has no wheezes. He has no rales.   Abdominal: Soft. Bowel sounds are normal. He exhibits no distension and no mass. There is no hepatosplenomegaly. There is no tenderness. There is no rebound and no guarding.   Musculoskeletal: Normal range of motion. He exhibits no edema.   Lymphadenopathy:     He has no cervical adenopathy.     He has no axillary adenopathy.        Right: No supraclavicular adenopathy present.        Left: No supraclavicular adenopathy present.   Neurological: He is alert and oriented to person, place, and time. No cranial nerve deficit.   Skin: No rash noted. No erythema.   Psychiatric: He has a normal mood and affect.       1/27/2017 PET/CT reveals   hepatic metastatic lesion      LIVER BIOPSIES, CORE BIOPSIES-BENIGN HEPATIC PARENCHYMA. THERE IS MILD MACRO VACUOLAR FATTY CHANGE AND MILD NONSPECIFIC FOCAL CHRONIC TRIADITIS. THERE IS NO EVIDENCE OF TUMOR OR GRANULOMATA PRESENT. A FOCAL AREA OF NONSPECIFIC FIBROSIS SUGGESTIVE OF SCAR TISSUE IS NOTED. THERE IS NO EVIDENCE OF MALIGNANCY.        Results for BRANDY OLIVA (MRN [id number]) as of 9/28/2017 09:17   Ref. Range 3/24/2017 10:24 4/11/2017 11:45 5/5/2017 09:22 6/8/2017 11:07 " 9/22/2017 11:00   CEA Latest Ref Range: 0.0 - 5.0 ng/mL 8.3 (H) 8.9 (H) 8.2 (H) 6.8 (H) 6.3 (H)     Results for BRANDY OLIVA (MRN [id number]) as of 11/28/2017 09:24   Ref. Range 9/22/2017 11:00 11/20/2017 07:48   CEA Latest Ref Range: 0.0 - 5.0 ng/mL 6.3 (H) 7.1 (H)       CT a/p w/contrast 11/20/2017   1.  Status post sigmoid resection, no recurrent or metastatic disease.    2.  Centrilobular emphysema lungs.    3.  Liver cysts benign and stable.    4.  Nonobstructed left inferior gallstone.  Assessment:       1. Rectal carcinoma    2. 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    CT imaging - negative for evidence of distant disease.     PET/CT imaging studies - hepatic lesion  S/p liver bx - benign   status post LAR,12/27/2016.pT3 pN1cM0 Stage IIIB  He is s/p adjuvant XELOX cycle 4  Completed 4/17/2017  Followed by chemo/RT initiated  5/22/2017   He completed XRT July 6, 2017  s/p partial completion of XELODA approx 6/21/2017 ( due to ASE)    Follow-up CT a/p 11/20/2017 - NEG for met disease  Surveillance colonoscopy planned next month     Follow-up with Surg     F/u  2 month with  CBC, CMP,CEA prior to f/u     CC: Darrick Jay M.D.